# Patient Record
Sex: FEMALE | Race: WHITE | NOT HISPANIC OR LATINO | Employment: OTHER | URBAN - METROPOLITAN AREA
[De-identification: names, ages, dates, MRNs, and addresses within clinical notes are randomized per-mention and may not be internally consistent; named-entity substitution may affect disease eponyms.]

---

## 2017-01-26 ENCOUNTER — TRANSCRIBE ORDERS (OUTPATIENT)
Dept: OBSTETRICS AND GYNECOLOGY | Facility: CLINIC | Age: 77
End: 2017-01-26

## 2017-01-26 DIAGNOSIS — Z12.31 VISIT FOR SCREENING MAMMOGRAM: Primary | ICD-10-CM

## 2017-03-24 ENCOUNTER — TRANSCRIBE ORDERS (OUTPATIENT)
Dept: OBSTETRICS AND GYNECOLOGY | Facility: CLINIC | Age: 77
End: 2017-03-24

## 2017-03-24 DIAGNOSIS — Z12.31 VISIT FOR SCREENING MAMMOGRAM: Primary | ICD-10-CM

## 2017-04-10 DIAGNOSIS — Z13.820 SCREENING FOR OSTEOPOROSIS: Primary | ICD-10-CM

## 2017-05-03 ENCOUNTER — HOSPITAL ENCOUNTER (OUTPATIENT)
Dept: MAMMOGRAPHY | Facility: HOSPITAL | Age: 77
Discharge: HOME | End: 2017-05-03
Attending: OBSTETRICS & GYNECOLOGY | Admitting: OBSTETRICS & GYNECOLOGY

## 2017-05-03 DIAGNOSIS — Z12.31 VISIT FOR SCREENING MAMMOGRAM: ICD-10-CM

## 2017-05-03 PROCEDURE — G0202 SCR MAMMO BI INCL CAD: HCPCS | Performed by: RADIOLOGY

## 2017-05-03 PROCEDURE — 77063 BREAST TOMOSYNTHESIS BI: CPT | Performed by: RADIOLOGY

## 2017-05-03 PROCEDURE — G0202 SCR MAMMO BI INCL CAD: HCPCS

## 2017-05-03 PROCEDURE — 77063 BREAST TOMOSYNTHESIS BI: CPT

## 2017-05-23 ENCOUNTER — OFFICE VISIT (OUTPATIENT)
Dept: OBSTETRICS AND GYNECOLOGY | Facility: CLINIC | Age: 77
End: 2017-05-23

## 2017-05-23 ENCOUNTER — HOSPITAL ENCOUNTER (OUTPATIENT)
Dept: BONE DENSITY | Facility: HOSPITAL | Age: 77
Discharge: HOME | End: 2017-05-23
Attending: OBSTETRICS & GYNECOLOGY | Admitting: OBSTETRICS & GYNECOLOGY

## 2017-05-23 VITALS
BODY MASS INDEX: 22.66 KG/M2 | DIASTOLIC BLOOD PRESSURE: 80 MMHG | SYSTOLIC BLOOD PRESSURE: 122 MMHG | HEIGHT: 65 IN | WEIGHT: 136 LBS

## 2017-05-23 DIAGNOSIS — M85.80 OSTEOPENIA: ICD-10-CM

## 2017-05-23 DIAGNOSIS — N95.2 VAGINAL ATROPHY: ICD-10-CM

## 2017-05-23 DIAGNOSIS — Z13.820 SCREENING FOR OSTEOPOROSIS: ICD-10-CM

## 2017-05-23 DIAGNOSIS — Z78.0 MENOPAUSE: Primary | ICD-10-CM

## 2017-05-23 DIAGNOSIS — N32.81 OAB (OVERACTIVE BLADDER): ICD-10-CM

## 2017-05-23 PROCEDURE — 77080 DXA BONE DENSITY AXIAL: CPT

## 2017-05-23 PROCEDURE — 77080 DXA BONE DENSITY AXIAL: CPT | Performed by: RADIOLOGY

## 2017-05-23 PROCEDURE — 99213 OFFICE O/P EST LOW 20 MIN: CPT | Performed by: OBSTETRICS & GYNECOLOGY

## 2017-05-23 RX ORDER — TOLTERODINE 4 MG/1
4 CAPSULE, EXTENDED RELEASE ORAL DAILY
COMMUNITY
End: 2018-05-02 | Stop reason: DRUGHIGH

## 2017-05-23 RX ORDER — AMITRIPTYLINE HYDROCHLORIDE 10 MG/1
1 TABLET, FILM COATED ORAL DAILY
COMMUNITY
Start: 2017-04-26 | End: 2019-06-19

## 2017-09-24 ENCOUNTER — OFFICE VISIT (OUTPATIENT)
Dept: RETAIL CLINIC | Facility: CLINIC | Age: 77
End: 2017-09-24

## 2017-09-24 DIAGNOSIS — Z23 NEEDS FLU SHOT: Primary | ICD-10-CM

## 2017-09-24 NOTE — PROGRESS NOTES
S: Requests Flu Vaccine.  Is feeling well today. Denies previous complications of flu vaccine, denies serious egg allergy. [employer] consent obtained.  O: Appears well today.  A: Vaccination against Influenza   P: Age appropriate flu vaccine administered (see vaccine immunization record) Tolerated Well.  Discussed that they may feel achy and fatigued in next day or 2, as their immune system is responding, this is not the flu. Advised that can take tylenol or ibuprofen per package instructions for soreness if needed. Also explained that it takes up to 2 weeks for full immune response. Encouraged general health hygiene. HERIBERTO Kate

## 2018-03-20 ENCOUNTER — TRANSCRIBE ORDERS (OUTPATIENT)
Dept: ADMINISTRATIVE | Facility: HOSPITAL | Age: 78
End: 2018-03-20

## 2018-03-20 DIAGNOSIS — Z12.31 VISIT FOR SCREENING MAMMOGRAM: Primary | ICD-10-CM

## 2018-05-02 ENCOUNTER — OFFICE VISIT (OUTPATIENT)
Dept: OBSTETRICS AND GYNECOLOGY | Facility: CLINIC | Age: 78
End: 2018-05-02

## 2018-05-02 ENCOUNTER — HOSPITAL ENCOUNTER (OUTPATIENT)
Dept: CARDIOLOGY | Facility: HOSPITAL | Age: 78
Discharge: HOME | End: 2018-05-02
Attending: OBSTETRICS & GYNECOLOGY | Admitting: OBSTETRICS & GYNECOLOGY

## 2018-05-02 ENCOUNTER — APPOINTMENT (OUTPATIENT)
Dept: LAB | Facility: HOSPITAL | Age: 78
End: 2018-05-02

## 2018-05-02 ENCOUNTER — DOCUMENTATION (OUTPATIENT)
Dept: OBSTETRICS AND GYNECOLOGY | Facility: CLINIC | Age: 78
End: 2018-05-02

## 2018-05-02 VITALS
WEIGHT: 139 LBS | DIASTOLIC BLOOD PRESSURE: 76 MMHG | SYSTOLIC BLOOD PRESSURE: 150 MMHG | HEIGHT: 65 IN | BODY MASS INDEX: 23.16 KG/M2

## 2018-05-02 DIAGNOSIS — Z01.818 PREOPERATIVE TESTING: Primary | ICD-10-CM

## 2018-05-02 DIAGNOSIS — N95.0 PMB (POSTMENOPAUSAL BLEEDING): ICD-10-CM

## 2018-05-02 DIAGNOSIS — N95.0 PMB (POSTMENOPAUSAL BLEEDING): Primary | ICD-10-CM

## 2018-05-02 DIAGNOSIS — N95.2 VAGINAL ATROPHY: ICD-10-CM

## 2018-05-02 DIAGNOSIS — Z01.818 PREOPERATIVE TESTING: ICD-10-CM

## 2018-05-02 DIAGNOSIS — Z78.0 MENOPAUSE: Primary | ICD-10-CM

## 2018-05-02 LAB
ALBUMIN SERPL-MCNC: 4 G/DL (ref 3.2–4.8)
ALBUMIN/GLOB SERPL: 1.8 G/DL (ref 1.5–2.5)
ALP SERPL-CCNC: 69 U/L (ref 25–100)
ALT SERPL W P-5'-P-CCNC: 19 U/L (ref 7–40)
ANION GAP SERPL CALCULATED.3IONS-SCNC: 7 MMOL/L (ref 3–11)
AST SERPL-CCNC: 31 U/L (ref 0–33)
BACTERIA UR QL AUTO: ABNORMAL /HPF
BILIRUB SERPL-MCNC: 0.3 MG/DL (ref 0.3–1.2)
BILIRUB UR QL STRIP: NEGATIVE
BUN BLD-MCNC: 25 MG/DL (ref 9–23)
BUN/CREAT SERPL: 25 (ref 7–25)
CALCIUM SPEC-SCNC: 9 MG/DL (ref 8.7–10.4)
CHLORIDE SERPL-SCNC: 107 MMOL/L (ref 99–109)
CLARITY UR: CLEAR
CO2 SERPL-SCNC: 28 MMOL/L (ref 20–31)
COLOR UR: YELLOW
CREAT BLD-MCNC: 1 MG/DL (ref 0.6–1.3)
DEPRECATED RDW RBC AUTO: 45.7 FL (ref 37–54)
ERYTHROCYTE [DISTWIDTH] IN BLOOD BY AUTOMATED COUNT: 14.1 % (ref 11.3–14.5)
GFR SERPL CREATININE-BSD FRML MDRD: 54 ML/MIN/1.73
GLOBULIN UR ELPH-MCNC: 2.2 GM/DL
GLUCOSE BLD-MCNC: 96 MG/DL (ref 70–100)
GLUCOSE UR STRIP-MCNC: NEGATIVE MG/DL
HCT VFR BLD AUTO: 44.7 % (ref 34.5–44)
HGB BLD-MCNC: 14.7 G/DL (ref 11.5–15.5)
HGB UR QL STRIP.AUTO: NEGATIVE
HYALINE CASTS UR QL AUTO: ABNORMAL /LPF
KETONES UR QL STRIP: NEGATIVE
LEUKOCYTE ESTERASE UR QL STRIP.AUTO: ABNORMAL
MCH RBC QN AUTO: 28.9 PG (ref 27–31)
MCHC RBC AUTO-ENTMCNC: 32.9 G/DL (ref 32–36)
MCV RBC AUTO: 87.8 FL (ref 80–99)
NITRITE UR QL STRIP: NEGATIVE
PH UR STRIP.AUTO: <=5 [PH] (ref 5–8)
PLATELET # BLD AUTO: 268 10*3/MM3 (ref 150–450)
PMV BLD AUTO: 9.5 FL (ref 6–12)
POTASSIUM BLD-SCNC: 4.5 MMOL/L (ref 3.5–5.5)
PROT SERPL-MCNC: 6.2 G/DL (ref 5.7–8.2)
PROT UR QL STRIP: NEGATIVE
RBC # BLD AUTO: 5.09 10*6/MM3 (ref 3.89–5.14)
RBC # UR: ABNORMAL /HPF
REF LAB TEST METHOD: ABNORMAL
SODIUM BLD-SCNC: 142 MMOL/L (ref 132–146)
SP GR UR STRIP: 1.01 (ref 1–1.03)
SQUAMOUS #/AREA URNS HPF: ABNORMAL /HPF
UROBILINOGEN UR QL STRIP: ABNORMAL
WBC NRBC COR # BLD: 8.33 10*3/MM3 (ref 3.5–10.8)
WBC UR QL AUTO: ABNORMAL /HPF

## 2018-05-02 PROCEDURE — 87086 URINE CULTURE/COLONY COUNT: CPT | Performed by: OBSTETRICS & GYNECOLOGY

## 2018-05-02 PROCEDURE — 93010 ELECTROCARDIOGRAM REPORT: CPT | Performed by: INTERNAL MEDICINE

## 2018-05-02 PROCEDURE — 80053 COMPREHEN METABOLIC PANEL: CPT | Performed by: OBSTETRICS & GYNECOLOGY

## 2018-05-02 PROCEDURE — 87186 SC STD MICRODIL/AGAR DIL: CPT | Performed by: OBSTETRICS & GYNECOLOGY

## 2018-05-02 PROCEDURE — 85027 COMPLETE CBC AUTOMATED: CPT | Performed by: OBSTETRICS & GYNECOLOGY

## 2018-05-02 PROCEDURE — 87077 CULTURE AEROBIC IDENTIFY: CPT | Performed by: OBSTETRICS & GYNECOLOGY

## 2018-05-02 PROCEDURE — 99213 OFFICE O/P EST LOW 20 MIN: CPT | Performed by: OBSTETRICS & GYNECOLOGY

## 2018-05-02 PROCEDURE — 36415 COLL VENOUS BLD VENIPUNCTURE: CPT | Performed by: OBSTETRICS & GYNECOLOGY

## 2018-05-02 PROCEDURE — 93005 ELECTROCARDIOGRAM TRACING: CPT | Performed by: OBSTETRICS & GYNECOLOGY

## 2018-05-02 PROCEDURE — 81001 URINALYSIS AUTO W/SCOPE: CPT | Performed by: OBSTETRICS & GYNECOLOGY

## 2018-05-02 RX ORDER — TOLTERODINE TARTRATE 1 MG/1
1 TABLET, EXTENDED RELEASE ORAL DAILY
COMMUNITY
End: 2019-06-19 | Stop reason: DRUGHIGH

## 2018-05-02 NOTE — PROGRESS NOTES
"   Chief Complaint   Patient presents with   • Vaginal Bleeding     'pinkish\"       Yin Siddiqui is a 77 y.o. year old  presenting to be seen because of a recent history of post menopausal spotting.  She denies urethral or rectal bleeding.  She denies pelvic pain or pressure.  She is not anticoagulated.  She is being treated for osteopenia with raloxifene, 60 mg daily.  Her last DEXA in May 2017 showed only mild osteopenia of the right femoral neck with the total hip and spine being normal.  Breast imaging has been benign.  Ovarian screening has been normal.    History   Sexual Activity   • Sexual activity: Yes   • Partners: Male   • Birth control/ protection: Post-menopausal       SCREENING TESTS  Year 2012   Age                         PAP                         HPV high risk                         Mammogram      benign                   RASHAD score                         Breast MRI                         Lipids                         Vitamin D                         Colonoscopy                         DEXA  Frax (hip/any)      osteopenia                   Ovarian Screen      normal                       No Additional Complaints Reported    The following portions of the patient's history were reviewed and updated as appropriate:vital signs and   She has Menopause on her pertinent problem list.  She  has a past surgical history that includes Eye surgery; Tonsillectomy; Hand surgery; Cyst Removal; and Cataract extraction w/ intraocular lens  implant, bilateral.  Current Outpatient Prescriptions   Medication Sig Dispense Refill   • tolterodine (DETROL) 1 MG tablet Take 1 mg by mouth Daily.     • amitriptyline (ELAVIL) 10 MG tablet Take 1 tablet by mouth Daily.     • LORazepam (ATIVAN) 1 MG tablet Take 1 mg by mouth daily.     • naproxen (NAPROSYN) 375 MG tablet Take 375 mg by mouth 2 (two) times a " "day as needed for mild pain (1-3).     • raloxifene (EVISTA) 60 MG tablet TAKE ONE TABLET BY MOUTH ONCE DAILY 90 tablet 2   • simvastatin (ZOCOR) 10 MG tablet Take 10 mg by mouth every night.       No current facility-administered medications for this visit.      She has No Known Allergies..        Review of Systems  A comprehensive review of systems was done.  Constitutional: negative for fever, chills, activity change, appetite change, fatigue and unexpected weight change.  Respiratory: negative  Cardiovascular: negative  Gastrointestinal: negative  Genitourinary:spotting  Musculoskeletal:negative  Behavioral/Psych: negative          /76   Ht 163.8 cm (64.5\")   Wt 63 kg (139 lb)   BMI 23.49 kg/m²     Physical Exam    General:  alert; cooperative; well developed; well nourished   Skin:  No suspicious lesions seen   Thyroid: normal to inspection and palpation   Lungs:  clear to auscultation bilaterally   Heart:  regular rate and rhythm, S1, S2 normal, no murmur, click, rub or gallop   Breasts:  Not performed.   Abdomen: soft, non-tender; no masses  no umbilical or inginual hernias are present  no hepato-splenomegaly   Pelvis: Clinical staff was present for exam  External genitalia:  normal appearance of the external genitalia including Bartholin's and [place]'s glands.  Vaginal:  atrophic mucosal changes are present;  Cervix:  normal appearance.  Uterus:  normal size, shape and consistency. anteverted;  Adnexa:  non palpable bilaterally.  Rectal:  anus visually normal appearing. recto-vaginal exam unremarkable and confirms findings;     Lab Review   No data reviewed    Imaging   Mammogram results- benign, and DEXA- mild osteopenia of the femoral neck    Medical counseling was given to patient for the following topics: diagnostic results, instructions for management, risk factor reductions and impressions . Total time of the encounter was 23 minute(s) and 17 minute(s)  was spent in face to face counseling.  I " have counseled the patient that she needs a pelvic ultrasound to evaluate her endometrial stripe.  I have counseled the patient that her ovarian screening has been normal.  I have counseled her in the need for continued weight-bearing bearing exercise as well as calcium with vitamin D.  I have counseled her that she may discontinue her raloxifene since she only has mild osteopenia of the right femoral neck  I have counseled the patient to follow-up with Dr. Brown regarding her OAB.          ASSESSMENT  Problems Addressed this Visit        Genitourinary    Menopause - Primary    Vaginal atrophy      Other Visit Diagnoses     PMB (postmenopausal bleeding)        Relevant Orders    US Non-ob Transvaginal            PLAN    · Medications prescribed this encounter:  No orders of the defined types were placed in this encounter.  · Pelvic ultrasound scan  · Follow up: PRN  · Calcium, 600 mg/ Vit. D, 400 IU daily     *Please note that portions of this documentation may have been completed with a voice recognition program.  Efforts were made to edit this dictation, but occasional words may have been mistranscribed.     This note was electronically signed.    TERRY Lam MD  May 2, 2018  3:00 PM

## 2018-05-02 NOTE — PROGRESS NOTES
History and Physical    Chief Complaint: Postmenopausal bleeding    Yin Siddiqui is a 77 y.o., , was seen in the office on 2018 with a recent history of postmenopausal spotting.  The patient denies pelvic pain or pressure.  A pelvic ultrasound revealed a 21.2 mm endometrial stripe.  The uterus is retroverted and normal size.  The ovaries are normal.  She is scheduled for hysteroscopy D&C with the Myosure device to further evaluate her postmenopausal bleeding.  The procedure has been explained to the patient and her  including the surgical risks of uterine perforation, bleeding, infection, and anesthetic risks.  She voiced understanding of these risks.  Informed consent will be signed.    Past Medical History:   Diagnosis Date   • Degenerative disc disease, lumbar    • Dyslipidemia    • Fibrocystic breast changes    • GERD (gastroesophageal reflux disease)    • Menopause    • Osteopenia    • Urge incontinence    • Vaginal atrophy        Allergies: Review of patient's allergies indicates no known allergies.    Medications:   Current Outpatient Prescriptions:   •  amitriptyline (ELAVIL) 10 MG tablet, Take 1 tablet by mouth Daily., Disp: , Rfl:   •  LORazepam (ATIVAN) 1 MG tablet, Take 1 mg by mouth daily., Disp: , Rfl:   •  naproxen (NAPROSYN) 375 MG tablet, Take 375 mg by mouth 2 (two) times a day as needed for mild pain (1-3)., Disp: , Rfl:   •  raloxifene (EVISTA) 60 MG tablet, TAKE ONE TABLET BY MOUTH ONCE DAILY, Disp: 90 tablet, Rfl: 2  •  simvastatin (ZOCOR) 10 MG tablet, Take 10 mg by mouth every night., Disp: , Rfl:   •  tolterodine (DETROL) 1 MG tablet, Take 1 mg by mouth Daily., Disp: , Rfl:     Previous Surgery:   Past Surgical History:   Procedure Laterality Date   • CATARACT EXTRACTION W/ INTRAOCULAR LENS  IMPLANT, BILATERAL     • CYST REMOVAL      left wrist   • EYE SURGERY     • HAND SURGERY     • TONSILLECTOMY         Review of Systems  A comprehensive review of systems was  negative.  Constitutional: negative for fever, chills, activity change, appetite change, fatigue and unexpected weight change.  Respiratory: negative  Cardiovascular: negative  Gastrointestinal: negative  Genitourinary:vaginal spotting  Musculoskeletal:negative  Behavioral/Psych: positive for mood swings      Social History   Substance Use Topics   • Smoking status: Never Smoker   • Smokeless tobacco: Never Used   • Alcohol use No       Recent Vitals       5/17/2016 5/23/2017 5/2/2018       BP: 126/78 122/80 150/76     Weight: 61.7 kg (136 lb) 61.7 kg (136 lb) 63 kg (139 lb)     BMI (Calculated): 23 23 23.5           Physical Exam  General:  alert; cooperative; well developed; well nourished   Skin:  No suspicious lesions seen   Thyroid: normal to inspection and palpation   Lungs:  breathing is unlabored  clear to auscultation bilaterally   Heart:  regular rate and rhythm, S1, S2 normal, no murmur, click, rub or gallop   Breasts:  Examined in supine position  Symmetric without masses or skin dimpling  Nipples normal without inversion, lesions or discharge  There are no palpable axillary nodes   Abdomen: soft, non-tender; no masses  no umbilical or inginual hernias are present  no hepato-splenomegaly   Pelvis: Clinical staff was present for exam  External genitalia:  normal appearance of the external genitalia including Bartholin's and [place]'s glands.  Vaginal:  normal pink mucosa without prolapse or lesions.  Cervix:  normal appearance.  Uterus:  normal size, shape and consistency. retroverted;  Adnexa:  non palpable bilaterally.  Rectal:  anus visually normal appearing. recto-vaginal exam unremarkable and confirms findings;         The surgical risks of uterine perforation, bleeding, infection and anesthestic risks were explained to the patient and she voiced understanding. Informed consent was signed.    No contraindications to planned surgery were detected      Impression: 1) Postmenopausal bleeding [N95.0]; 2)  Thickened endometrium [R93.8]        Plan: 1) Hysteroscopy, D&C (Myosure Reach)      *Please note that portions of this documentation may have been completed with a voice recognition program.  Efforts were made to edit this dictation, but occasional words may have been mistranscribed.  This note was electronically signed.    TERRY Lam MD  May 2, 2018  4:23 PM

## 2018-05-03 ENCOUNTER — TELEPHONE (OUTPATIENT)
Dept: OBSTETRICS AND GYNECOLOGY | Facility: CLINIC | Age: 78
End: 2018-05-03

## 2018-05-03 DIAGNOSIS — N30.90 CYSTITIS: Primary | ICD-10-CM

## 2018-05-03 RX ORDER — SULFAMETHOXAZOLE AND TRIMETHOPRIM 800; 160 MG/1; MG/1
1 TABLET ORAL 2 TIMES DAILY
Qty: 10 TABLET | Refills: 0 | Status: SHIPPED | OUTPATIENT
Start: 2018-05-03 | End: 2018-05-08

## 2018-05-03 NOTE — TELEPHONE ENCOUNTER
Pt's  notified of Urinalysis indicating need to treat. Instructions given for pt to take Bactrim DS TWO times today, then will receive IV antibiotics tomorrow, so to resume Bactrim on Saturday 5/5, until complete. Also encouraged  to remind pt to drink extra fluids. He voiced understanding instructions.

## 2018-05-04 ENCOUNTER — LAB REQUISITION (OUTPATIENT)
Dept: LAB | Facility: HOSPITAL | Age: 78
End: 2018-05-04

## 2018-05-04 ENCOUNTER — OUTSIDE FACILITY SERVICE (OUTPATIENT)
Dept: OBSTETRICS AND GYNECOLOGY | Facility: CLINIC | Age: 78
End: 2018-05-04

## 2018-05-04 DIAGNOSIS — N95.0 POSTMENOPAUSAL BLEEDING: ICD-10-CM

## 2018-05-04 LAB
BACTERIA SPEC AEROBE CULT: ABNORMAL
BACTERIA SPEC AEROBE CULT: ABNORMAL

## 2018-05-04 PROCEDURE — 58558 HYSTEROSCOPY BIOPSY: CPT | Performed by: OBSTETRICS & GYNECOLOGY

## 2018-05-04 PROCEDURE — 88305 TISSUE EXAM BY PATHOLOGIST: CPT | Performed by: OBSTETRICS & GYNECOLOGY

## 2018-05-07 ENCOUNTER — HOSPITAL ENCOUNTER (OUTPATIENT)
Dept: MAMMOGRAPHY | Facility: HOSPITAL | Age: 78
Discharge: HOME | End: 2018-05-07
Attending: OBSTETRICS & GYNECOLOGY | Admitting: OBSTETRICS & GYNECOLOGY

## 2018-05-07 DIAGNOSIS — Z12.31 VISIT FOR SCREENING MAMMOGRAM: ICD-10-CM

## 2018-05-07 LAB
CYTO UR: NORMAL
LAB AP CASE REPORT: NORMAL
LAB AP CLINICAL INFORMATION: NORMAL
Lab: NORMAL
PATH REPORT.FINAL DX SPEC: NORMAL
PATH REPORT.GROSS SPEC: NORMAL

## 2018-05-07 PROCEDURE — 77063 BREAST TOMOSYNTHESIS BI: CPT

## 2018-05-07 PROCEDURE — 77067 SCR MAMMO BI INCL CAD: CPT

## 2018-05-07 PROCEDURE — 77063 BREAST TOMOSYNTHESIS BI: CPT | Performed by: RADIOLOGY

## 2018-05-07 PROCEDURE — 77067 SCR MAMMO BI INCL CAD: CPT | Performed by: RADIOLOGY

## 2018-05-08 ENCOUNTER — TELEPHONE (OUTPATIENT)
Dept: OBSTETRICS AND GYNECOLOGY | Facility: CLINIC | Age: 78
End: 2018-05-08

## 2018-05-08 DIAGNOSIS — N95.0 PMB (POSTMENOPAUSAL BLEEDING): Primary | ICD-10-CM

## 2018-05-08 NOTE — TELEPHONE ENCOUNTER
This patient underwent a hysteroscopy D&C on 5/4/2018 for postmenopausal spotting.  The endometrial curettings were benign with evidence of some double benign polyp.  She calls today to indicate that she is still having spotting.  Has used 4 pads today.  She denies pelvic pain or pressure.  I have advised her that I will treat with oral micronized progesterone, 100 mg twice a day for 2 weeks to attempt to stop the bleeding.  If bleeding does not resolve or if the bleeding resolves and recurs she will be a candidate for TLH/BSO.  We'll notify us of her response to medication.

## 2018-05-15 ENCOUNTER — TELEPHONE (OUTPATIENT)
Dept: OBSTETRICS AND GYNECOLOGY | Facility: CLINIC | Age: 78
End: 2018-05-15

## 2018-05-15 DIAGNOSIS — N95.0 PMB (POSTMENOPAUSAL BLEEDING): ICD-10-CM

## 2018-05-15 NOTE — TELEPHONE ENCOUNTER
"Pt calling to say she is doing well since her Hysteroscopy D&C and vaginal bleeding has decreased \"quite a bit\" since she has been taking the progesterone 100 mg 2 x daily. She says she has 5.5 days left on the RX and is wondering if we could give her 1 refill because they will be traveling to [facility] and would like to be less bothered by the possibility of heavier vag bleeding. Per Dr Lam: OK to send in Progesterone 100 mg  BID #28, with no refills. Pt has appt 6/6/18 and understands to call sooner with questions or concerns.  "

## 2018-06-06 ENCOUNTER — OFFICE VISIT (OUTPATIENT)
Dept: OBSTETRICS AND GYNECOLOGY | Facility: CLINIC | Age: 78
End: 2018-06-06

## 2018-06-06 VITALS
HEIGHT: 65 IN | DIASTOLIC BLOOD PRESSURE: 72 MMHG | SYSTOLIC BLOOD PRESSURE: 116 MMHG | BODY MASS INDEX: 22.36 KG/M2 | WEIGHT: 134.2 LBS

## 2018-06-06 DIAGNOSIS — Z01.419 ENCOUNTER FOR GYNECOLOGICAL EXAMINATION WITHOUT ABNORMAL FINDING: ICD-10-CM

## 2018-06-06 DIAGNOSIS — Z78.0 MENOPAUSE: Primary | ICD-10-CM

## 2018-06-06 DIAGNOSIS — N60.11 FIBROCYSTIC BREAST CHANGES OF BOTH BREASTS: ICD-10-CM

## 2018-06-06 DIAGNOSIS — N32.81 OAB (OVERACTIVE BLADDER): ICD-10-CM

## 2018-06-06 DIAGNOSIS — N95.2 VAGINAL ATROPHY: ICD-10-CM

## 2018-06-06 DIAGNOSIS — N60.12 FIBROCYSTIC BREAST CHANGES OF BOTH BREASTS: ICD-10-CM

## 2018-06-06 PROCEDURE — G0101 CA SCREEN;PELVIC/BREAST EXAM: HCPCS | Performed by: OBSTETRICS & GYNECOLOGY

## 2018-06-06 NOTE — PROGRESS NOTES
Chief Complaint   Patient presents with   • Gynecologic Exam       Yin Siddiqui is a 77 y.o. year old  presenting to be seen for her annual exam.This patient had postmenopausal bleeding and underwent a hysteroscopy D&C on 2018 with findings of a benign endometrial polyp.  She had postoperative bleeding and was treated with Provera, 5 mg twice a day with cessation of bleeding.  She has had no further PMB.  She has a history of mild osteopenia of the femoral neck.  She has a history of overactive bladder treated with tolterodine.  Rest imaging has been benign.    SCREENING TESTS    Year 2012   Age                         PAP                         HPV high risk                         Mammogram      benign benign                  RASHAD score                         Breast MRI                         Lipids                         Vitamin D                         Colonoscopy                         DEXA  Frax (hip/any)      osteopenia                   Ovarian Screen                             She exercises regularly: yes.  She wears her seat belt: yes.  She has concerns about domestic violence: no.  She has noticed changes in height: no    GYN screening history:  · Last mammogram: was done on approximately 2018 and the result was: Birads I (Normal).  · Last DEXA: was done on approximately 2017 and the results were: osteopenia of the femoral neck.    No Additional Complaints Reported    The following portions of the patient's history were reviewed and updated as appropriate:vital signs and   She  has a past medical history of Degenerative disc disease, lumbar; Dyslipidemia; GERD (gastroesophageal reflux disease); Menopause; Osteopenia; Urge incontinence; and Vaginal atrophy.  She has Menopause on her pertinent problem list.  She  has a past surgical history that includes Eye surgery;  "Tonsillectomy; Hand surgery; Cyst Removal; Cataract extraction w/ intraocular lens  implant, bilateral; and d&c hysteroscopy.  Her family history includes Breast cancer (age of onset: 78) in her mother.  She  reports that she has never smoked. She has never used smokeless tobacco. She reports that she does not drink alcohol or use drugs.  Current Outpatient Prescriptions   Medication Sig Dispense Refill   • amitriptyline (ELAVIL) 10 MG tablet Take 1 tablet by mouth Daily.     • LORazepam (ATIVAN) 1 MG tablet Take 1 mg by mouth daily.     • naproxen (NAPROSYN) 375 MG tablet Take 375 mg by mouth 2 (two) times a day as needed for mild pain (1-3).     • simvastatin (ZOCOR) 10 MG tablet Take 10 mg by mouth every night.     • tolterodine (DETROL) 1 MG tablet Take 1 mg by mouth Daily.       No current facility-administered medications for this visit.      She has No Known Allergies..    Review of Systems  A comprehensive review of systems was taken.  Constitutional: negative for fever, chills, activity change, appetite change, fatigue and unexpected weight change.  Respiratory: negative  Cardiovascular: negative  Gastrointestinal: negative  Genitourinary:negative  Musculoskeletal:positive for back pain  Behavioral/Psych: negative       /72   Ht 163.8 cm (64.5\")   Wt 60.9 kg (134 lb 3.2 oz)   BMI 22.68 kg/m²     Physical Exam    General:  alert; cooperative; well developed; well nourished   Skin:  No suspicious lesions seen   Thyroid: normal to inspection and palpation   Lungs:  clear to auscultation bilaterally   Heart:  regular rate and rhythm, S1, S2 normal, no murmur, click, rub or gallop   Breasts:  Examined in supine position  Symmetric without masses or skin dimpling  Nipples normal without inversion, lesions or discharge  There are no palpable axillary nodes  Fibrocystic changes are present both breasts without a discrete mass   Abdomen: soft, non-tender; no masses  no umbilical or inginual hernias are " present  no hepato-splenomegaly   Pelvis: Clinical staff was present for exam  External genitalia:  normal appearance of the external genitalia including Bartholin's and [place]'s glands.  Vaginal:  atrophic mucosal changes are present;  Cervix:  normal appearance.  Uterus:  normal size, shape and consistency. retroverted;  Adnexa:  non palpable bilaterally.  Rectal:  anus visually normal appearing. recto-vaginal exam unremarkable and confirms findings;     Lab Review   No data reviewed    Imaging  Mammogram results- benign, and DEXA- mild osteopenia of the right femoral neck, with the spine and total hip being normal         ASSESSMENT  Problems Addressed this Visit        Musculoskeletal and Integument    OAB (overactive bladder)       Genitourinary    Menopause - Primary    Vaginal atrophy      Other Visit Diagnoses     Encounter for gynecological examination without abnormal finding               Fibrocystic changes of both breasts    PLAN    · Medications prescribed this encounter:  No orders of the defined types were placed in this encounter.  · Monthly self breast assessment and annual breast imaging  · The patient has further PMB she will contact me  · Calcium, 600 mg/ Vit. D, 400 IU daily; regular weight-bearing exercise  · Follow up: 12 month(s)  *Please note that portions of this documentation may have been completed with a voice recognition program.  Efforts were made to edit this dictation, but occasional words may have been mistranscribed.       This note was electronically signed.    TERRY Lam MD  June 6, 2018  10:16 AM

## 2018-11-13 ENCOUNTER — TELEPHONE (OUTPATIENT)
Dept: OBSTETRICS AND GYNECOLOGY | Facility: CLINIC | Age: 78
End: 2018-11-13

## 2018-11-13 NOTE — TELEPHONE ENCOUNTER
[facility] called us to indicate that on her normal ovarian screening there was fluid in the endometrium and possibly a small polyp.  This patient had a hysteroscopy/D&C with a small polyp removed in May of 2018.  On questioning she has had no bleeding.  I have reassured her that I do not believe this is malignant since her uterus is very small and the prior curettings in May were benign.  I asked her to contact me immediately if she has any spotting or bleeding.

## 2019-03-27 ENCOUNTER — TRANSCRIBE ORDERS (OUTPATIENT)
Dept: ADMINISTRATIVE | Facility: HOSPITAL | Age: 79
End: 2019-03-27

## 2019-03-27 DIAGNOSIS — Z12.31 VISIT FOR SCREENING MAMMOGRAM: Primary | ICD-10-CM

## 2019-04-10 DIAGNOSIS — Z12.11 SCREENING FOR COLON CANCER: Primary | ICD-10-CM

## 2019-04-16 ENCOUNTER — OUTSIDE FACILITY SERVICE (OUTPATIENT)
Dept: GASTROENTEROLOGY | Facility: CLINIC | Age: 79
End: 2019-04-16

## 2019-04-16 PROCEDURE — G0121 COLON CA SCRN NOT HI RSK IND: HCPCS | Performed by: INTERNAL MEDICINE

## 2019-04-16 PROCEDURE — G0500 MOD SEDAT ENDO SERVICE >5YRS: HCPCS | Performed by: INTERNAL MEDICINE

## 2019-05-15 ENCOUNTER — HOSPITAL ENCOUNTER (OUTPATIENT)
Dept: MAMMOGRAPHY | Facility: HOSPITAL | Age: 79
Discharge: HOME | End: 2019-05-15
Admitting: FAMILY MEDICINE

## 2019-05-15 DIAGNOSIS — Z12.31 VISIT FOR SCREENING MAMMOGRAM: ICD-10-CM

## 2019-05-15 PROCEDURE — 77063 BREAST TOMOSYNTHESIS BI: CPT | Performed by: RADIOLOGY

## 2019-05-15 PROCEDURE — 77067 SCR MAMMO BI INCL CAD: CPT

## 2019-05-15 PROCEDURE — 77067 SCR MAMMO BI INCL CAD: CPT | Performed by: RADIOLOGY

## 2019-05-15 PROCEDURE — 77063 BREAST TOMOSYNTHESIS BI: CPT

## 2019-06-19 ENCOUNTER — OFFICE VISIT (OUTPATIENT)
Dept: OBSTETRICS AND GYNECOLOGY | Facility: CLINIC | Age: 79
End: 2019-06-19

## 2019-06-19 VITALS
DIASTOLIC BLOOD PRESSURE: 68 MMHG | WEIGHT: 132 LBS | BODY MASS INDEX: 21.99 KG/M2 | HEIGHT: 65 IN | SYSTOLIC BLOOD PRESSURE: 112 MMHG

## 2019-06-19 DIAGNOSIS — Z01.419 ENCOUNTER FOR GYNECOLOGICAL EXAMINATION WITHOUT ABNORMAL FINDING: ICD-10-CM

## 2019-06-19 DIAGNOSIS — N39.41 URGE INCONTINENCE: ICD-10-CM

## 2019-06-19 DIAGNOSIS — N60.12 FIBROCYSTIC BREAST CHANGES OF BOTH BREASTS: ICD-10-CM

## 2019-06-19 DIAGNOSIS — N60.11 FIBROCYSTIC BREAST CHANGES OF BOTH BREASTS: ICD-10-CM

## 2019-06-19 DIAGNOSIS — Z78.0 MENOPAUSE: Primary | ICD-10-CM

## 2019-06-19 DIAGNOSIS — N95.2 VAGINAL ATROPHY: ICD-10-CM

## 2019-06-19 PROCEDURE — 99397 PER PM REEVAL EST PAT 65+ YR: CPT | Performed by: OBSTETRICS & GYNECOLOGY

## 2019-06-19 RX ORDER — TOLTERODINE TARTRATE 2 MG/1
2 TABLET, EXTENDED RELEASE ORAL DAILY
Qty: 30 TABLET | Refills: 11 | Status: SHIPPED | OUTPATIENT
Start: 2019-06-19 | End: 2019-10-28

## 2019-06-19 RX ORDER — ZOSTER VACCINE RECOMBINANT, ADJUVANTED 50 MCG/0.5
KIT INTRAMUSCULAR
COMMUNITY
Start: 2019-06-17 | End: 2019-09-26

## 2019-06-19 RX ORDER — DONEPEZIL HYDROCHLORIDE 10 MG/1
10 TABLET, FILM COATED ORAL NIGHTLY
COMMUNITY
End: 2021-06-18

## 2019-06-19 RX ORDER — HEPATITIS A VACCINE 1440 [IU]/ML
INJECTION, SUSPENSION INTRAMUSCULAR
COMMUNITY
Start: 2019-06-17 | End: 2019-09-26

## 2019-06-19 NOTE — PROGRESS NOTES
Chief Complaint   Patient presents with   • Gynecologic Exam       Yin Siddiqui is a 78 y.o. year old  presenting to be seen for her annual exam.  This patient is menopausal and does not use estrogen/progestin replacement therapy.  She denies menopausal symptoms.  She does have a history of vaginal atrophy.  In May 2018 she had an episode of postmenopausal spotting.  She had a pelvic ultrasound on 2018 revealing slight thickening of the endometrial cavity and normal ovaries.  On 2018 she underwent a hysteroscopy/D&C/endometrial polypectomy for a benign endometrial polyp.  She has had no further postmenopausal bleeding or spotting.  She has a history of overactive bladder and is treated with tolterodine, 2 mg daily.  She has occasional leakage.  She denies bowel symptoms.    SCREENING TESTS    Year 2012 2033   Age                         PAP                         HPV high risk                         Mammogram       benign benign                 RASHAD score                         Breast MRI                         Lipids                         Vitamin D                         Colonoscopy                         DEXA  Frax (hip/any)                         Ovarian Screen                             She exercises regularly: yes.  She wears her seat belt: yes.  She has concerns about domestic violence: no.  She has noticed changes in height: no    GYN screening history:  · Last mammogram: was done on approximately 5/15/2019 and the result was: Birads I (Normal)..    No Additional Complaints Reported    The following portions of the patient's history were reviewed and updated as appropriate:vital signs and   She  has a past medical history of Degenerative disc disease, lumbar, Dyslipidemia, GERD (gastroesophageal reflux disease), Menopause, Osteopenia, Urge incontinence, and Vaginal atrophy.  She  "has Menopause on their pertinent problem list.  She  has a past surgical history that includes Eye surgery; Tonsillectomy; Hand surgery; Cyst Removal; Cataract extraction w/ intraocular lens  implant, bilateral; and d&c hysteroscopy.  Her family history includes Breast cancer (age of onset: 78) in her mother.  She  reports that she has never smoked. She has never used smokeless tobacco. She reports that she does not drink alcohol or use drugs.  Current Outpatient Medications   Medication Sig Dispense Refill   • donepezil (ARICEPT) 10 MG tablet Take 5 mg by mouth Every Night.     • HAVRIX 1440 EL U/ML vaccine      • LORazepam (ATIVAN) 1 MG tablet Take 1 mg by mouth daily.     • naproxen (NAPROSYN) 375 MG tablet Take 375 mg by mouth 2 (two) times a day as needed for mild pain (1-3).     • SHINGRIX 50 MCG/0.5ML reconstituted suspension      • simvastatin (ZOCOR) 10 MG tablet Take 10 mg by mouth every night.     • tolterodine (DETROL) 2 MG tablet Take 1 tablet by mouth Daily. 30 tablet 11     No current facility-administered medications for this visit.      She has No Known Allergies..    Review of Systems  A comprehensive review of systems was taken.  Constitutional: negative for fever, chills, activity change, appetite change, fatigue and unexpected weight change.  Respiratory: negative  Cardiovascular: negative  Gastrointestinal: negative  Genitourinary:negative  Musculoskeletal:negative  Behavioral/Psych: negative       /68   Ht 163.8 cm (64.5\")   Wt 59.9 kg (132 lb)   Breastfeeding? No   BMI 22.31 kg/m²     Physical Exam    General:  alert; cooperative; well developed; well nourished   Skin:  No suspicious lesions seen   Thyroid: normal to inspection and palpation   Lungs:  clear to auscultation bilaterally   Heart:  regular rate and rhythm, S1, S2 normal, no murmur, click, rub or gallop   Breasts:  Examined in supine position  Symmetric without masses or skin dimpling  Nipples normal without inversion, " lesions or discharge  There are no palpable axillary nodes  Fibrocystic changes are present both breasts without a discrete mass   Abdomen: soft, non-tender; no masses  no umbilical or inguinal hernias are present  no hepato-splenomegaly   Pelvis: Clinical staff was present for exam  External genitalia:  normal appearance of the external genitalia including Bartholin's and [place]'s glands.  Vaginal:  atrophic mucosal changes are present;  Cervix:  normal appearance.  Uterus:  normal size, shape and consistency. retroverted;  Adnexa:  non palpable bilaterally.  Rectal:  anus visually normal appearing. recto-vaginal exam unremarkable and confirms findings;     Lab Review   No data reviewed    Imaging  Pelvic ultrasound results from 5/2018 revealed normal ovaries and thickening of the endometrial stripe, and Mammogram results- Birads I         ASSESSMENT  Problems Addressed this Visit        Genitourinary    Menopause - Primary    Vaginal atrophy    Urge incontinence    Relevant Medications    tolterodine (DETROL) 2 MG tablet       Other    Fibrocystic breast changes of both breasts      Other Visit Diagnoses     Encounter for gynecological examination without abnormal finding              PLAN    Medications prescribed this encounter:    New Medications Ordered This Visit   Medications   • tolterodine (DETROL) 2 MG tablet     Sig: Take 1 tablet by mouth Daily.     Dispense:  30 tablet     Refill:  11   · Monthly self breast assessment and annual breast imaging  · Calcium, 600 mg/ Vit. D, 400 IU daily; regular weight-bearing exercise  · Follow up: 12 month(s)  *Please note that portions of this documentation may have been completed with a voice recognition program.  Efforts were made to edit this dictation, but occasional words may have been mistranscribed.       This note was electronically signed.    TERRY Lam MD  June 19, 2019  3:58 PM

## 2019-09-13 ENCOUNTER — TELEPHONE (OUTPATIENT)
Dept: OBSTETRICS AND GYNECOLOGY | Facility: CLINIC | Age: 79
End: 2019-09-13

## 2019-09-13 NOTE — TELEPHONE ENCOUNTER
Yin called back.  I let her know that she would need to come in for a pelvic ultrasound and office visit next week.  She states that she had eye surgery 2 weeks ago, so would be unable to have surgery right away if needed.  Kaitlin will call her on Monday to schedule appointment for ultrasound and she will see Dr. Lam after the ultrasound to discuss results and need for treatment.

## 2019-09-13 NOTE — TELEPHONE ENCOUNTER
Attempted to return patient's call on the number requested.  No answer, but I left a message asking Yin to return my call.

## 2019-09-15 ENCOUNTER — APPOINTMENT (OUTPATIENT)
Dept: ULTRASOUND IMAGING | Facility: HOSPITAL | Age: 79
End: 2019-09-15

## 2019-09-15 ENCOUNTER — HOSPITAL ENCOUNTER (EMERGENCY)
Facility: HOSPITAL | Age: 79
Discharge: HOME | End: 2019-09-15
Attending: EMERGENCY MEDICINE | Admitting: EMERGENCY MEDICINE

## 2019-09-15 VITALS
HEART RATE: 87 BPM | SYSTOLIC BLOOD PRESSURE: 115 MMHG | RESPIRATION RATE: 16 BRPM | DIASTOLIC BLOOD PRESSURE: 76 MMHG | HEIGHT: 64 IN | WEIGHT: 125 LBS | BODY MASS INDEX: 21.34 KG/M2 | TEMPERATURE: 99 F | OXYGEN SATURATION: 98 %

## 2019-09-15 DIAGNOSIS — N95.0 POST-MENOPAUSAL BLEEDING: Primary | ICD-10-CM

## 2019-09-15 DIAGNOSIS — N85.8 UTERINE MASS: ICD-10-CM

## 2019-09-15 LAB
ALBUMIN SERPL-MCNC: 3.9 G/DL (ref 3.5–5.2)
ALBUMIN/GLOB SERPL: 1.3 G/DL
ALP SERPL-CCNC: 107 U/L (ref 39–117)
ALT SERPL W P-5'-P-CCNC: 20 U/L (ref 1–33)
ANION GAP SERPL CALCULATED.3IONS-SCNC: 11 MMOL/L (ref 5–15)
AST SERPL-CCNC: 27 U/L (ref 1–32)
BASOPHILS # BLD AUTO: 0.02 10*3/MM3 (ref 0–0.2)
BASOPHILS NFR BLD AUTO: 0.3 % (ref 0–1.5)
BILIRUB SERPL-MCNC: 0.3 MG/DL (ref 0.2–1.2)
BUN BLD-MCNC: 16 MG/DL (ref 8–23)
BUN/CREAT SERPL: 18 (ref 7–25)
CALCIUM SPEC-SCNC: 9.4 MG/DL (ref 8.6–10.5)
CHLORIDE SERPL-SCNC: 105 MMOL/L (ref 98–107)
CO2 SERPL-SCNC: 25 MMOL/L (ref 22–29)
CREAT BLD-MCNC: 0.89 MG/DL (ref 0.57–1)
DEPRECATED RDW RBC AUTO: 43.8 FL (ref 37–54)
EOSINOPHIL # BLD AUTO: 0.21 10*3/MM3 (ref 0–0.4)
EOSINOPHIL NFR BLD AUTO: 3.2 % (ref 0.3–6.2)
ERYTHROCYTE [DISTWIDTH] IN BLOOD BY AUTOMATED COUNT: 13.5 % (ref 12.3–15.4)
GFR SERPL CREATININE-BSD FRML MDRD: 62 ML/MIN/1.73
GLOBULIN UR ELPH-MCNC: 3 GM/DL
GLUCOSE BLD-MCNC: 101 MG/DL (ref 65–99)
HCT VFR BLD AUTO: 43 % (ref 34–46.6)
HGB BLD-MCNC: 13.7 G/DL (ref 12–15.9)
IMM GRANULOCYTES # BLD AUTO: 0.02 10*3/MM3 (ref 0–0.05)
IMM GRANULOCYTES NFR BLD AUTO: 0.3 % (ref 0–0.5)
INR PPP: 1 (ref 0.85–1.16)
LYMPHOCYTES # BLD AUTO: 1.26 10*3/MM3 (ref 0.7–3.1)
LYMPHOCYTES NFR BLD AUTO: 19.2 % (ref 19.6–45.3)
MCH RBC QN AUTO: 28.4 PG (ref 26.6–33)
MCHC RBC AUTO-ENTMCNC: 31.9 G/DL (ref 31.5–35.7)
MCV RBC AUTO: 89.2 FL (ref 79–97)
MONOCYTES # BLD AUTO: 0.61 10*3/MM3 (ref 0.1–0.9)
MONOCYTES NFR BLD AUTO: 9.3 % (ref 5–12)
NEUTROPHILS # BLD AUTO: 4.43 10*3/MM3 (ref 1.7–7)
NEUTROPHILS NFR BLD AUTO: 67.7 % (ref 42.7–76)
NRBC BLD AUTO-RTO: 0 /100 WBC (ref 0–0.2)
PLATELET # BLD AUTO: 331 10*3/MM3 (ref 140–450)
PMV BLD AUTO: 9.4 FL (ref 6–12)
POTASSIUM BLD-SCNC: 4.4 MMOL/L (ref 3.5–5.2)
PROT SERPL-MCNC: 6.9 G/DL (ref 6–8.5)
PROTHROMBIN TIME: 12.7 SECONDS (ref 11.2–14.3)
RBC # BLD AUTO: 4.82 10*6/MM3 (ref 3.77–5.28)
SODIUM BLD-SCNC: 141 MMOL/L (ref 136–145)
WBC NRBC COR # BLD: 6.55 10*3/MM3 (ref 3.4–10.8)

## 2019-09-15 PROCEDURE — 82378 CARCINOEMBRYONIC ANTIGEN: CPT | Performed by: EMERGENCY MEDICINE

## 2019-09-15 PROCEDURE — 99283 EMERGENCY DEPT VISIT LOW MDM: CPT

## 2019-09-15 PROCEDURE — 85610 PROTHROMBIN TIME: CPT | Performed by: EMERGENCY MEDICINE

## 2019-09-15 PROCEDURE — 86304 IMMUNOASSAY TUMOR CA 125: CPT | Performed by: EMERGENCY MEDICINE

## 2019-09-15 PROCEDURE — 80053 COMPREHEN METABOLIC PANEL: CPT | Performed by: EMERGENCY MEDICINE

## 2019-09-15 PROCEDURE — 76830 TRANSVAGINAL US NON-OB: CPT

## 2019-09-15 PROCEDURE — 85025 COMPLETE CBC W/AUTO DIFF WBC: CPT | Performed by: EMERGENCY MEDICINE

## 2019-09-15 RX ORDER — HYDROCODONE BITARTRATE AND ACETAMINOPHEN 5; 325 MG/1; MG/1
0.5 TABLET ORAL EVERY 6 HOURS PRN
Qty: 8 TABLET | Refills: 0 | Status: SHIPPED | OUTPATIENT
Start: 2019-09-15 | End: 2019-09-26

## 2019-09-15 NOTE — ED PROVIDER NOTES
"Subjective   Ms. Yin Siddiqui is a 78 y.o. female who presents to the ED with c/o vaginal bleeding. She reports for the past 2 days she has experienced spontaneous, heavy, and worsening vaginal bleeding. Since last night, she has soaked 2 large pads. She also complains of abdominal aching and denies nausea and changes in urination. 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 Patiño, opthalmologist. Since that procedure, she has experienced neck and back pain due to staying upright after the surgery. Her gynecologist is Dr. Lam. There are no other acute complaints at this time.        History provided by:  Patient  Vaginal Bleeding   Severity:  Moderate  Onset quality:  Sudden  Duration:  2 days  Timing:  Constant  Progression:  Unchanged  Chronicity:  New  Menstrual history:  Postmenopausal  Number of pads used:  2 large pads since last night  Context: spontaneously    Relieved by:  None tried  Ineffective treatments:  None tried  Associated symptoms: abdominal pain (\"aching\") and back pain    Associated symptoms: no dysuria and no nausea        Review of Systems   Gastrointestinal: Positive for abdominal pain (\"aching\"). Negative for nausea.   Genitourinary: Positive for vaginal bleeding. Negative for difficulty urinating, dysuria, frequency and urgency.   Musculoskeletal: Positive for back pain and neck pain.   All other systems reviewed and are negative.      Past Medical History:   Diagnosis Date   • Degenerative disc disease, lumbar    • Dyslipidemia    • GERD (gastroesophageal reflux disease)    • Menopause    • Osteopenia    • Urge incontinence    • Vaginal atrophy      Note from Dr. Lam's progress note on 19:    Yin Siddiqui is a 78 y.o. year old  presenting to be seen for her annual exam.  This patient is menopausal and does not use estrogen/progestin replacement therapy.  She denies menopausal symptoms.  She does have a history of vaginal atrophy.  In May " 2018 she had an episode of postmenopausal spotting.  She had a pelvic ultrasound on 5/2/2018 revealing slight thickening of the endometrial cavity and normal ovaries.  On 5/4/2018 she underwent a hysteroscopy/D&C/endometrial polypectomy for a benign endometrial polyp.  She has had no further postmenopausal bleeding or spotting.  She has a history of overactive bladder and is treated with tolterodine, 2 mg daily.  She has occasional leakage.  She denies bowel symptoms.    I also reviewed the office call-in note from 9/13/19.      No Known Allergies    Past Surgical History:   Procedure Laterality Date   • CATARACT EXTRACTION W/ INTRAOCULAR LENS  IMPLANT, BILATERAL     • CYST REMOVAL      left wrist   • D&C HYSTEROSCOPY     • EYE SURGERY     • HAND SURGERY     • TONSILLECTOMY         Family History   Problem Relation Age of Onset   • Breast cancer Mother 78   • Ovarian cancer Neg Hx        Social History     Socioeconomic History   • Marital status:      Spouse name: Not on file   • Number of children: Not on file   • Years of education: Not on file   • Highest education level: Not on file   Tobacco Use   • Smoking status: Never Smoker   • Smokeless tobacco: Never Used   Substance and Sexual Activity   • Alcohol use: No   • Drug use: No   • Sexual activity: Yes     Partners: Male     Birth control/protection: Post-menopausal         Objective   Physical Exam   Constitutional: She is oriented to person, place, and time. She appears well-developed and well-nourished. No distress.   The patient appears very anxious.   HENT:   Head: Normocephalic and atraumatic.   Nose: Nose normal.   The left eye is patched and the right eye is reactive and normal.   Eyes: Conjunctivae are normal. No scleral icterus.   Neck: Normal range of motion. Neck supple.   Cardiovascular: Normal rate, regular rhythm and normal heart sounds.   No murmur heard.  Pulmonary/Chest: Effort normal and breath sounds normal. No respiratory distress.    Abdominal: Soft. She exhibits no mass. There is tenderness. There is no guarding.   Thin with mild suprapubic tenderness on exam.  No organomegaly.   Genitourinary:   Genitourinary Comments: Normal, postmenopausal atrophic vagina.  There was approximately 3 tablespoons of blood in the vaginal vault that was aspirated with Yankauer suction.  No mass on the cervix.  Mild suprapubic tenderness on exam.   Musculoskeletal: Normal range of motion.   Neurological: She is alert and oriented to person, place, and time.   Skin: Skin is warm and dry.   Psychiatric: She has a normal mood and affect. Her behavior is normal.   Nursing note and vitals reviewed.      Procedures         ED Course  ED Course as of Sep 15 1652   Sun Sep 15, 2019   1334 Spoke with Dr. Patiño, the patient's ophthalmologist, who advises the patient can lay down and she has no post-operative restrictions except no anticoagulation. -EIR  [HF]      ED Course User Index  [HF] Candy Freitas       Recent Results (from the past 24 hour(s))   Comprehensive Metabolic Panel    Collection Time: 09/15/19 12:08 PM   Result Value Ref Range    Glucose 101 (H) 65 - 99 mg/dL    BUN 16 8 - 23 mg/dL    Creatinine 0.89 0.57 - 1.00 mg/dL    Sodium 141 136 - 145 mmol/L    Potassium 4.4 3.5 - 5.2 mmol/L    Chloride 105 98 - 107 mmol/L    CO2 25.0 22.0 - 29.0 mmol/L    Calcium 9.4 8.6 - 10.5 mg/dL    Total Protein 6.9 6.0 - 8.5 g/dL    Albumin 3.90 3.50 - 5.20 g/dL    ALT (SGPT) 20 1 - 33 U/L    AST (SGOT) 27 1 - 32 U/L    Alkaline Phosphatase 107 39 - 117 U/L    Total Bilirubin 0.3 0.2 - 1.2 mg/dL    eGFR Non African Amer 62 >60 mL/min/1.73    Globulin 3.0 gm/dL    A/G Ratio 1.3 g/dL    BUN/Creatinine Ratio 18.0 7.0 - 25.0    Anion Gap 11.0 5.0 - 15.0 mmol/L   Protime-INR    Collection Time: 09/15/19 12:08 PM   Result Value Ref Range    Protime 12.7 11.2 - 14.3 Seconds    INR 1.00 0.85 - 1.16   CBC Auto Differential    Collection Time: 09/15/19 12:08 PM   Result  Value Ref Range    WBC 6.55 3.40 - 10.80 10*3/mm3    RBC 4.82 3.77 - 5.28 10*6/mm3    Hemoglobin 13.7 12.0 - 15.9 g/dL    Hematocrit 43.0 34.0 - 46.6 %    MCV 89.2 79.0 - 97.0 fL    MCH 28.4 26.6 - 33.0 pg    MCHC 31.9 31.5 - 35.7 g/dL    RDW 13.5 12.3 - 15.4 %    RDW-SD 43.8 37.0 - 54.0 fl    MPV 9.4 6.0 - 12.0 fL    Platelets 331 140 - 450 10*3/mm3    Neutrophil % 67.7 42.7 - 76.0 %    Lymphocyte % 19.2 (L) 19.6 - 45.3 %    Monocyte % 9.3 5.0 - 12.0 %    Eosinophil % 3.2 0.3 - 6.2 %    Basophil % 0.3 0.0 - 1.5 %    Immature Grans % 0.3 0.0 - 0.5 %    Neutrophils, Absolute 4.43 1.70 - 7.00 10*3/mm3    Lymphocytes, Absolute 1.26 0.70 - 3.10 10*3/mm3    Monocytes, Absolute 0.61 0.10 - 0.90 10*3/mm3    Eosinophils, Absolute 0.21 0.00 - 0.40 10*3/mm3    Basophils, Absolute 0.02 0.00 - 0.20 10*3/mm3    Immature Grans, Absolute 0.02 0.00 - 0.05 10*3/mm3    nRBC 0.0 0.0 - 0.2 /100 WBC     Note: In addition to lab results from this visit, the labs listed above may include labs taken at another facility or during a different encounter within the last 24 hours. Please correlate lab times with ED admission and discharge times for further clarification of the services performed during this visit.    US Non-ob Transvaginal   Preliminary Result   Enlarged abnormal-appearing uterus with submucosal or   endometrial focus measuring 7 x 5.5 x 6.8 cm of mixed solid and cystic   heterogeneity with some internal flow on Doppler interrogation   concerning for complex heterogeneous thickening of the endometrium or a   submucosal partially degenerative leiomyoma/fibroid. Follow-up with   OB/GYN consultation recommended. Ovaries without evidence for torsion or   free fluid in the cul-de-sac.        DICTATED:   09/15/2019   EDITED/ls :   09/15/2019             Vitals:    09/15/19 1115 09/15/19 1520   BP: 137/65 115/76   BP Location: Left arm    Patient Position: Sitting    Pulse: 87    Resp: 16    Temp: 99 °F (37.2 °C)    TempSrc: Oral   "  SpO2: 96% 98%   Weight: 56.7 kg (125 lb)    Height: 162.6 cm (64\")      Medications - No data to display  ECG/EMG Results (last 24 hours)     ** No results found for the last 24 hours. **        No orders to display                     MDM  Number of Diagnoses or Management Options  Post-menopausal bleeding:   Uterine mass:   Diagnosis management comments:       I reviewed all available studies at bedside with the patient and her .  Her labs are reassuring.  Her ultrasound however does show a uterine mass looks mucosal.  This is fairly impressive and I think is the cause of her bleeding but the etiology of this mass is unclear.    Her bleeding is subsided a lot here in the ER and she is hemodynamically stable.    I spoke to Dr. Patiño, the patient's ophthalmologist.  Ophthalmologic point of view with her recent left eye surgery she is fine to lay in Trendelenburg or have any sort of procedure she needs.  Could also be anticoagulated if needed.    In speaking with Dr. Lam he would like to see the patient in office tomorrow afternoon he anticipates need for hysterectomy.  I discussed all this in detail with the patient and her  they will return to the ER if worse in any way.    I wrote her a short course of pain medicine if she develops pain or cramping unrelieved by Tylenol.  He Boo was unavailable at time of the dictation.    All are agreeable with plan       Amount and/or Complexity of Data Reviewed  Clinical lab tests: reviewed  Tests in the radiology section of CPT®: reviewed        Final diagnoses:   Post-menopausal bleeding   Uterine mass       Documentation assistance provided by ron Freitas.  Information recorded by the scribsadi was done at my direction and has been verified and validated by me.     Candy Freitas  09/15/19 1331       Will Navarro MD  09/15/19 1652    "

## 2019-09-16 ENCOUNTER — OFFICE VISIT (OUTPATIENT)
Dept: OBSTETRICS AND GYNECOLOGY | Facility: CLINIC | Age: 79
End: 2019-09-16

## 2019-09-16 VITALS
HEIGHT: 64 IN | SYSTOLIC BLOOD PRESSURE: 116 MMHG | WEIGHT: 125.6 LBS | DIASTOLIC BLOOD PRESSURE: 70 MMHG | BODY MASS INDEX: 21.44 KG/M2

## 2019-09-16 DIAGNOSIS — N95.0 PMB (POSTMENOPAUSAL BLEEDING): ICD-10-CM

## 2019-09-16 DIAGNOSIS — D25.0 SUBMUCOUS LEIOMYOMA OF UTERUS: ICD-10-CM

## 2019-09-16 DIAGNOSIS — Z78.0 MENOPAUSE: Primary | ICD-10-CM

## 2019-09-16 LAB
CANCER AG125 SERPL QL: 36.6 U/ML (ref 0–38.1)
CEA SERPL-MCNC: 0.82 NG/ML

## 2019-09-16 PROCEDURE — 99214 OFFICE O/P EST MOD 30 MIN: CPT | Performed by: OBSTETRICS & GYNECOLOGY

## 2019-09-16 NOTE — PROGRESS NOTES
Chief Complaint   Patient presents with   • Vaginal Bleeding     post-menopausal bleeding, patient was seen in ED yesterday       Yin Siddiqui is a 78 y.o. year old  presenting to be seen because of follow-up after an emergency department visit yesterday at [facility] for postmenopausal bleeding.  This patient had postmenopausal bleeding in May 2018 and underwent a hysteroscopy D&C with polypectomy.  Her bleeding resolved until yesterday.  She was seen in the emergency department and was hemodynamically stable.  Her H&H was 13.7/43%.  She had a pelvic ultrasound revealing a large, 7 cm submucosal uterine leiomyoma.  Her CEA was 0.82 and her CA-125 was 36.6.  She denies bowel or urinary symptoms.  She has recently had a gas bubble placed on her left eye and has been quite weak since that time.    Social History     Substance and Sexual Activity   Sexual Activity Yes   • Partners: Male   • Birth control/protection: Post-menopausal     SCREENING TESTS    Year 2012   Age                         PAP                         HPV high risk                         Mammogram        benign                 RASHAD score                         Breast MRI                         Lipids                         Vitamin D                         Colonoscopy                         DEXA  Frax (hip/any)                         Ovarian Screen        normal                     No Additional Complaints Reported    The following portions of the patient's history were reviewed and updated as appropriate:vital signs and   She  has a past medical history of Degenerative disc disease, lumbar, Dyslipidemia, GERD (gastroesophageal reflux disease), Menopause, Osteopenia, Urge incontinence, and Vaginal atrophy.  She has Menopause on their pertinent problem list.  She  has a past surgical history that includes Eye  "surgery; Tonsillectomy; Hand surgery; Cyst Removal; Cataract extraction w/ intraocular lens  implant, bilateral; and d&c hysteroscopy.  Her family history includes Breast cancer (age of onset: 78) in her mother.  She  reports that she has never smoked. She has never used smokeless tobacco. She reports that she does not drink alcohol or use drugs.  Current Outpatient Medications   Medication Sig Dispense Refill   • donepezil (ARICEPT) 10 MG tablet Take 5 mg by mouth Every Night.     • HAVRIX 1440 EL U/ML vaccine      • HYDROcodone-acetaminophen (NORCO) 5-325 MG per tablet Take 0.5 tablets by mouth Every 6 (Six) Hours As Needed for Moderate Pain . 8 tablet 0   • LORazepam (ATIVAN) 1 MG tablet Take 1 mg by mouth daily.     • naproxen (NAPROSYN) 375 MG tablet Take 375 mg by mouth 2 (two) times a day as needed for mild pain (1-3).     • SHINGRIX 50 MCG/0.5ML reconstituted suspension      • simvastatin (ZOCOR) 10 MG tablet Take 10 mg by mouth every night.     • tolterodine (DETROL) 2 MG tablet Take 1 tablet by mouth Daily. 30 tablet 11     No current facility-administered medications for this visit.      She has No Known Allergies..        Review of Systems  A comprehensive review of systems was done.  Constitutional: fatigue  Respiratory: negative  Cardiovascular: negative  Gastrointestinal: negative  Genitourinary:negative  Musculoskeletal:negative  Behavioral/Psych: negative          /70   Ht 162.6 cm (64\")   Wt 57 kg (125 lb 9.6 oz)   Breastfeeding? No   BMI 21.56 kg/m²     Physical Exam    General:  alert; cooperative; well developed; well nourished  thin   Skin:  No suspicious lesions seen   Thyroid: normal to inspection and palpation   Lungs:  clear to auscultation bilaterally   Heart:  regular rate and rhythm, S1, S2 normal, no murmur, click, rub or gallop   Breasts:  Not performed.   Abdomen: soft, non-tender; no masses  no umbilical or inguinal hernias are present  no hepato-splenomegaly   Pelvis: " Clinical staff was present for exam  External genitalia:  normal appearance of the external genitalia including Bartholin's and Hartsel's glands.  Vaginal:  blood present -  dark red;  Cervix:  normal appearance.  Uterus:  symmetrically enlarged, consisent with 8 weeks size;  Adnexa:  non palpable bilaterally.     Lab Review   CBC results, CMP results and CA-125 and CEA results    Imaging   Pelvic ultrasound results- 7 cm submucosal mass    Medical counseling was given to patient and  for the following topics: diagnostic results, instructions for management, risk factor reductions, impressions, risks and benefits of treatment options and importance of treatment compliance . Total time of the encounter was 27 minute(s) and 21 minute(s)  was spent in face to face counseling.  I have counseled the patient and her  that she has a submucosal mass that may be either a benign leiomyoma or a sarcoma.  I have counseled her that this is occurred fairly rapidly since her hysteroscopy D&C done in May 2018 for a benign endometrial polyp.  I have counseled her that hysterectomy must be done.  I have counseled her that we would remove her tubes and ovaries at the same time.  I have counseled them that I will need to talk to Dr. Claudio to determine whether she feels that she should do the procedure or I will do the procedure?  I have counseled her that her CA-125 is slightly elevated at 36.6 and her CEA is normal.  I have counseled them that her ovaries are normal on ultrasound.  I have given them pamphlets about hysterectomy including robotically assisted-laparoscopic hysterectomy.  Counseled her about the risks and benefits of surgery.          ASSESSMENT  Problems Addressed this Visit        Genitourinary    Menopause - Primary      Other Visit Diagnoses     PMB (postmenopausal bleeding)        Submucous leiomyoma of uterus               Substance History:    reports that she has never smoked. She has never used  smokeless tobacco.    reports that she does not drink alcohol.    reports that she does not use drugs.    Substance use counseling is not indicated based on patient history.      PLAN    · Medications prescribed this encounter:  No orders of the defined types were placed in this encounter.  · She will continue to get rest so that her I can heal.  · Pamphlets about hysterectomy and about uterine leiomyomata  · Follow up: Will call the patient tomorrow about surgery scheduling  · Calcium, 600 mg/ Vit. D, 400 IU daily     *Please note that portions of this documentation may have been completed with a voice recognition program.  Efforts were made to edit this dictation, but occasional words may have been mistranscribed.     This note was electronically signed.    TERRY Lam MD  September 16, 2019  5:02 PM

## 2019-09-17 ENCOUNTER — HOSPITAL ENCOUNTER (OUTPATIENT)
Dept: CT IMAGING | Facility: HOSPITAL | Age: 79
End: 2019-09-17

## 2019-09-17 DIAGNOSIS — N95.0 PMB (POSTMENOPAUSAL BLEEDING): ICD-10-CM

## 2019-09-17 DIAGNOSIS — D25.0 SUBMUCOUS LEIOMYOMA OF UTERUS: Primary | ICD-10-CM

## 2019-09-18 ENCOUNTER — OFFICE VISIT (OUTPATIENT)
Dept: OBSTETRICS AND GYNECOLOGY | Facility: CLINIC | Age: 79
End: 2019-09-18

## 2019-09-18 ENCOUNTER — HOSPITAL ENCOUNTER (OUTPATIENT)
Dept: CT IMAGING | Facility: HOSPITAL | Age: 79
Discharge: HOME | End: 2019-09-18
Admitting: OBSTETRICS & GYNECOLOGY

## 2019-09-18 VITALS
SYSTOLIC BLOOD PRESSURE: 128 MMHG | WEIGHT: 125 LBS | HEIGHT: 64 IN | DIASTOLIC BLOOD PRESSURE: 80 MMHG | BODY MASS INDEX: 21.34 KG/M2

## 2019-09-18 DIAGNOSIS — D25.0 SUBMUCOUS LEIOMYOMA OF UTERUS: ICD-10-CM

## 2019-09-18 DIAGNOSIS — Z78.0 MENOPAUSE: Primary | ICD-10-CM

## 2019-09-18 DIAGNOSIS — N95.0 PMB (POSTMENOPAUSAL BLEEDING): ICD-10-CM

## 2019-09-18 PROCEDURE — 74177 CT ABD & PELVIS W/CONTRAST: CPT

## 2019-09-18 PROCEDURE — 25010000002 IOPAMIDOL 61 % SOLUTION: Performed by: OBSTETRICS & GYNECOLOGY

## 2019-09-18 PROCEDURE — 58100 BIOPSY OF UTERUS LINING: CPT | Performed by: OBSTETRICS & GYNECOLOGY

## 2019-09-18 RX ORDER — FERROUS SULFATE 325(65) MG
325 TABLET ORAL
Qty: 30 TABLET | Refills: 3 | Status: SHIPPED | OUTPATIENT
Start: 2019-09-18 | End: 2019-10-28

## 2019-09-18 RX ADMIN — IOPAMIDOL 85 ML: 612 INJECTION, SOLUTION INTRAVENOUS at 11:18

## 2019-09-18 NOTE — PROGRESS NOTES
PROCEDURE DATE: 2019  Chief Complaint   Patient presents with   • Procedure     endometrial biopsy       Yin Siddiqui is a 78 y.o. year old  presenting to be seen for appointment to have an endometrial biopsy.  This patient had a hysteroscopy D&C polypectomy in May 2018.  She developed a recurrence of postmenopausal bleeding and was seen in the emergency department at [facility] on 9/15/2019.  A pelvic ultrasound scan revealed a 7 cm submucosal mass.  The patient is hemodynamically stable.  She is here today to have an endometrial biopsy and this afternoon will have a CT scan of the abdomen and pelvis to evaluate lymph node status and the status of her ovaries.  I have explained endometrial biopsy to the patient including the risks of pain, bleeding, and infection.  She voiced understanding of these risks.      Risks and benefits discussed? yes  All questions answered? yes  Consents given by the patient  Written consent obtained? yes    Local anesthesia used:  no    Procedure documentation:          In the exam room the patient was placed in lithotomy position.  A speculum was introduced and the cervix was prepared with Betadine.  A single-tooth tenaculum was placed on the anterior lip of the cervix.                                                       Using a 3 mm Pipelle and endometrial biopsy was obtained.  The tissue was placed in preservative.  There were no complications and the procedure was well-tolerated.  Findings:                                      1) Postmenopausal bleeding [N95.0]                                                       2) Submucosal uterine leiomyoma [D25.0]    Plan:                                            1) Endometrial biopsy                                                      2) CT scan of abdomen and pelvis today                                                      3) Gyn Oncology consult with Dr. Claudio  *Please note that portions of this  documentation may have been completed with a voice recognition program.  Efforts were made to edit this dictation, but occasional words may have been mistranscribed.  This note was electronically signed.    TERRY Lam MD  September 18, 2019  10:18 AM

## 2019-09-19 ENCOUNTER — TELEPHONE (OUTPATIENT)
Dept: OBSTETRICS AND GYNECOLOGY | Facility: CLINIC | Age: 79
End: 2019-09-19

## 2019-09-19 NOTE — TELEPHONE ENCOUNTER
I have called the patient and her  to explain the CT scan which was done yesterday.  I have explained that there are some granulomatous calcifications in the spleen and some multiple gamal-pelvic cysts of the kidneys which appear to be benign.  I have explained to her that I have reviewed the CT scan with Dr. Kate.  There is a 7 cm mass in the endometrial cavity which is extremely vascular from 6:00 to 9:00.  It does appear to have some cystic degeneration.  Dr. Kate does not feel that it has a classic appearance for or a sarcoma.  There was no evidence of invasion into the myometrium and the pelvic lymph nodes were normal.  I have explained to them that Dr. Claudio will see her next week and a decision will be made as to how to proceed regarding her surgical intervention.  I have reassured them that I will contact her after the endometrial biopsy results are back.  I have counseled them to contact us should her bleeding increase.

## 2019-09-24 ENCOUNTER — TELEPHONE (OUTPATIENT)
Dept: GYNECOLOGIC ONCOLOGY | Facility: CLINIC | Age: 79
End: 2019-09-24

## 2019-09-24 NOTE — TELEPHONE ENCOUNTER
----- Message from Nesha Rick sent at 9/24/2019  3:18 PM EDT -----  Regarding: FLU SHOT  Contact: [phone]  PATIENT  PHONED AND WANTED TO KNOW IF THEY CAN GET THEIR FLU SHOTS

## 2019-09-26 ENCOUNTER — APPOINTMENT (OUTPATIENT)
Dept: LAB | Facility: HOSPITAL | Age: 79
End: 2019-09-26

## 2019-09-26 ENCOUNTER — OFFICE VISIT (OUTPATIENT)
Dept: GYNECOLOGIC ONCOLOGY | Facility: CLINIC | Age: 79
End: 2019-09-26

## 2019-09-26 ENCOUNTER — HOSPITAL ENCOUNTER (OUTPATIENT)
Dept: GENERAL RADIOLOGY | Facility: HOSPITAL | Age: 79
Discharge: HOME | End: 2019-09-26
Admitting: OBSTETRICS & GYNECOLOGY

## 2019-09-26 ENCOUNTER — PREP FOR SURGERY (OUTPATIENT)
Dept: GYNECOLOGIC ONCOLOGY | Facility: CLINIC | Age: 79
End: 2019-09-26

## 2019-09-26 ENCOUNTER — APPOINTMENT (OUTPATIENT)
Dept: PREADMISSION TESTING | Facility: HOSPITAL | Age: 79
End: 2019-09-26

## 2019-09-26 ENCOUNTER — PATIENT EDUCATION (SURGERY INSTRUCTIONS) (OUTPATIENT)
Dept: GYNECOLOGIC ONCOLOGY | Facility: CLINIC | Age: 79
End: 2019-09-26

## 2019-09-26 VITALS
SYSTOLIC BLOOD PRESSURE: 145 MMHG | HEIGHT: 63 IN | BODY MASS INDEX: 22.15 KG/M2 | OXYGEN SATURATION: 98 % | RESPIRATION RATE: 12 BRPM | WEIGHT: 125 LBS | TEMPERATURE: 98.1 F | HEART RATE: 68 BPM | DIASTOLIC BLOOD PRESSURE: 66 MMHG

## 2019-09-26 VITALS — HEIGHT: 63 IN | WEIGHT: 125.2 LBS | BODY MASS INDEX: 22.18 KG/M2

## 2019-09-26 DIAGNOSIS — D25.0 SUBMUCOUS LEIOMYOMA OF UTERUS: ICD-10-CM

## 2019-09-26 DIAGNOSIS — R93.89 THICKENED ENDOMETRIUM: ICD-10-CM

## 2019-09-26 DIAGNOSIS — N95.0 PMB (POSTMENOPAUSAL BLEEDING): Primary | ICD-10-CM

## 2019-09-26 DIAGNOSIS — D25.0 SUBMUCOUS LEIOMYOMA OF UTERUS: Primary | ICD-10-CM

## 2019-09-26 DIAGNOSIS — C54.1 ENDOMETRIAL CANCER (HCC): ICD-10-CM

## 2019-09-26 LAB
ABO GROUP BLD: NORMAL
ALBUMIN SERPL-MCNC: 4.4 G/DL (ref 3.5–5.2)
ALBUMIN/GLOB SERPL: 2 G/DL
ALP SERPL-CCNC: 108 U/L (ref 39–117)
ALT SERPL W P-5'-P-CCNC: 13 U/L (ref 1–33)
ANION GAP SERPL CALCULATED.3IONS-SCNC: 11 MMOL/L (ref 5–15)
AST SERPL-CCNC: 24 U/L (ref 1–32)
BASOPHILS # BLD AUTO: 0.01 10*3/MM3 (ref 0–0.2)
BASOPHILS NFR BLD AUTO: 0.2 % (ref 0–1.5)
BILIRUB SERPL-MCNC: 0.4 MG/DL (ref 0.2–1.2)
BLD GP AB SCN SERPL QL: NEGATIVE
BUN BLD-MCNC: 15 MG/DL (ref 8–23)
BUN/CREAT SERPL: 18.3 (ref 7–25)
CALCIUM SPEC-SCNC: 10 MG/DL (ref 8.6–10.5)
CHLORIDE SERPL-SCNC: 105 MMOL/L (ref 98–107)
CO2 SERPL-SCNC: 27 MMOL/L (ref 22–29)
CREAT BLD-MCNC: 0.82 MG/DL (ref 0.57–1)
DEPRECATED RDW RBC AUTO: 45.1 FL (ref 37–54)
EOSINOPHIL # BLD AUTO: 0.18 10*3/MM3 (ref 0–0.4)
EOSINOPHIL NFR BLD AUTO: 3.3 % (ref 0.3–6.2)
ERYTHROCYTE [DISTWIDTH] IN BLOOD BY AUTOMATED COUNT: 13.6 % (ref 12.3–15.4)
GFR SERPL CREATININE-BSD FRML MDRD: 68 ML/MIN/1.73
GLOBULIN UR ELPH-MCNC: 2.2 GM/DL
GLUCOSE BLD-MCNC: 94 MG/DL (ref 65–99)
HBA1C MFR BLD: 5.4 % (ref 4.8–5.6)
HCT VFR BLD AUTO: 44.5 % (ref 34–46.6)
HGB BLD-MCNC: 13.9 G/DL (ref 12–15.9)
IMM GRANULOCYTES # BLD AUTO: 0.01 10*3/MM3 (ref 0–0.05)
IMM GRANULOCYTES NFR BLD AUTO: 0.2 % (ref 0–0.5)
LYMPHOCYTES # BLD AUTO: 1.49 10*3/MM3 (ref 0.7–3.1)
LYMPHOCYTES NFR BLD AUTO: 27.3 % (ref 19.6–45.3)
MCH RBC QN AUTO: 28.4 PG (ref 26.6–33)
MCHC RBC AUTO-ENTMCNC: 31.2 G/DL (ref 31.5–35.7)
MCV RBC AUTO: 90.8 FL (ref 79–97)
MONOCYTES # BLD AUTO: 0.55 10*3/MM3 (ref 0.1–0.9)
MONOCYTES NFR BLD AUTO: 10.1 % (ref 5–12)
NEUTROPHILS # BLD AUTO: 3.22 10*3/MM3 (ref 1.7–7)
NEUTROPHILS NFR BLD AUTO: 58.9 % (ref 42.7–76)
NRBC BLD AUTO-RTO: 0 /100 WBC (ref 0–0.2)
PLATELET # BLD AUTO: 309 10*3/MM3 (ref 140–450)
PMV BLD AUTO: 9.3 FL (ref 6–12)
POTASSIUM BLD-SCNC: 4.5 MMOL/L (ref 3.5–5.2)
PROT SERPL-MCNC: 6.6 G/DL (ref 6–8.5)
RBC # BLD AUTO: 4.9 10*6/MM3 (ref 3.77–5.28)
RH BLD: POSITIVE
SODIUM BLD-SCNC: 143 MMOL/L (ref 136–145)
T&S EXPIRATION DATE: NORMAL
WBC NRBC COR # BLD: 5.46 10*3/MM3 (ref 3.4–10.8)

## 2019-09-26 PROCEDURE — 86900 BLOOD TYPING SEROLOGIC ABO: CPT | Performed by: OBSTETRICS & GYNECOLOGY

## 2019-09-26 PROCEDURE — 71046 X-RAY EXAM CHEST 2 VIEWS: CPT

## 2019-09-26 PROCEDURE — 80053 COMPREHEN METABOLIC PANEL: CPT | Performed by: OBSTETRICS & GYNECOLOGY

## 2019-09-26 PROCEDURE — 86923 COMPATIBILITY TEST ELECTRIC: CPT

## 2019-09-26 PROCEDURE — 83036 HEMOGLOBIN GLYCOSYLATED A1C: CPT | Performed by: ANESTHESIOLOGY

## 2019-09-26 PROCEDURE — 99205 OFFICE O/P NEW HI 60 MIN: CPT | Performed by: OBSTETRICS & GYNECOLOGY

## 2019-09-26 PROCEDURE — 36415 COLL VENOUS BLD VENIPUNCTURE: CPT

## 2019-09-26 PROCEDURE — 86901 BLOOD TYPING SEROLOGIC RH(D): CPT | Performed by: OBSTETRICS & GYNECOLOGY

## 2019-09-26 PROCEDURE — 86850 RBC ANTIBODY SCREEN: CPT | Performed by: OBSTETRICS & GYNECOLOGY

## 2019-09-26 PROCEDURE — 85025 COMPLETE CBC W/AUTO DIFF WBC: CPT | Performed by: OBSTETRICS & GYNECOLOGY

## 2019-09-26 RX ORDER — CHLORAL HYDRATE 500 MG
1000 CAPSULE ORAL
COMMUNITY
End: 2022-07-08

## 2019-09-26 RX ORDER — ACETAMINOPHEN 325 MG/1
650 TABLET ORAL ONCE
Status: CANCELLED | OUTPATIENT
Start: 2019-09-26

## 2019-09-26 RX ORDER — PREGABALIN 25 MG/1
150 CAPSULE ORAL ONCE
Status: CANCELLED | OUTPATIENT
Start: 2019-09-26 | End: 2019-09-26

## 2019-09-26 RX ORDER — SODIUM CHLORIDE, SODIUM LACTATE, POTASSIUM CHLORIDE, CALCIUM CHLORIDE 600; 310; 30; 20 MG/100ML; MG/100ML; MG/100ML; MG/100ML
100 INJECTION, SOLUTION INTRAVENOUS CONTINUOUS
Status: CANCELLED | OUTPATIENT
Start: 2019-09-26

## 2019-09-26 RX ORDER — CELECOXIB 100 MG/1
200 CAPSULE ORAL ONCE
Status: CANCELLED | OUTPATIENT
Start: 2019-09-26

## 2019-09-26 RX ORDER — NAPROXEN 500 MG/1
500 TABLET ORAL 2 TIMES DAILY PRN
COMMUNITY
End: 2019-09-30 | Stop reason: HOSPADM

## 2019-09-26 NOTE — PROGRESS NOTES
Yin Siddiqui  [id number]  1940      Reason for visit: Postmenopausal bleeding, endometrial biopsy concerning for uterine carcinoma    Consultation:  Patient is being seen at the request of Dr. Lam    History of present illness:  The patient is a 78 y.o. year old female who presents today for treatment and evaluation of the above issues.  Patient had a history of postmenopausal bleeding a year ago and underwent a hysteroscopy D&C and polypectomy in May 2018.  The biopsy results at that time came back as benign.  She then had recurrence of the bleeding 2 weeks ago.  She was evaluated in the emergency department with a CT abdomen pelvis and was noted to have a mass likely originating in the uterus.  She was then evaluated by Dr. Lam who did a endometrial biopsy which returned as extensive tumor necrosis with marked cell atypia.  Given this finding she is referred to Dr. Claudio for further management.  She continues to have light vaginal bleeding.  She denies any abdominal pain.  She denies any weight loss.  She denies any lumps or bumps anywhere.      For new patients, [facility] intake form from 19 was reviewed and confirmed today.    OBGYN History:  She is a .  . She has history of HRT ~2 yrs. She does not have a history of abnormal pap smears.  Her last colonoscopy was 2019 which was normal.  She had a mammogram of May 2019 that was normal.      Oncologic History:   No history exists.         Past Medical History:   Diagnosis Date   • Degenerative disc disease, lumbar    • Dyslipidemia    • GERD (gastroesophageal reflux disease)    • Menopause    • Osteopenia    • Urge incontinence    • Vaginal atrophy        Past Surgical History:   Procedure Laterality Date   • CATARACT EXTRACTION W/ INTRAOCULAR LENS  IMPLANT, BILATERAL     • CYST REMOVAL      left wrist   • D&C HYSTEROSCOPY     • EYE SURGERY     • HAND SURGERY     • TONSILLECTOMY         MEDICATIONS: The current medication list was  reviewed with the patient and updated in the EMR this date per the Medical Assistant. Medication dosages and frequencies were confirmed to be accurate.      Allergies:  has No Known Allergies.    Social History:   Social History     Socioeconomic History   • Marital status:      Spouse name: Not on file   • Number of children: Not on file   • Years of education: Not on file   • Highest education level: Not on file   Tobacco Use   • Smoking status: Never Smoker   • Smokeless tobacco: Never Used   Substance and Sexual Activity   • Alcohol use: No   • Drug use: No   • Sexual activity: Yes     Partners: Male     Birth control/protection: Post-menopausal       Family History:    Family History   Problem Relation Age of Onset   • Breast cancer Mother 78   • Ovarian cancer Neg Hx        Health Maintenance:    Health Maintenance   Topic Date Due   • TDAP/TD VACCINES (1 - Tdap) 10/03/1961   • ZOSTER VACCINE (1 of 2) 10/03/1992   • PNEUMOCOCCAL VACCINES (65+ LOW/MEDIUM RISK) (2 of 2 - PPSV23) 01/27/2017   • MEDICARE ANNUAL WELLNESS  04/23/2019   • DXA SCAN  05/23/2019   • INFLUENZA VACCINE  08/01/2019   • MAMMOGRAM  05/15/2021   • COLONOSCOPY  04/16/2029         Review of Systems   Constitutional: Positive for fatigue. Negative for unexpected weight change.   HENT: Negative for ear pain and rhinorrhea.    Eyes: Positive for pain (eye surgery) and visual disturbance. Negative for redness.   Respiratory: Negative for cough and shortness of breath.    Cardiovascular: Negative for chest pain and leg swelling.   Gastrointestinal: Negative for abdominal pain, constipation and diarrhea.   Endocrine: Negative for polydipsia and polyphagia.   Genitourinary: Positive for vaginal bleeding. Negative for flank pain and pelvic pain.   Musculoskeletal: Positive for arthralgias. Negative for myalgias.   Skin: Negative for pallor and rash.   Allergic/Immunologic: Negative for food allergies and immunocompromised state.   Neurological:  "Negative for syncope and light-headedness.   Psychiatric/Behavioral: Negative for dysphoric mood and suicidal ideas.       Physical Exam    Vitals:    09/26/19 1357   BP: 145/66   Pulse: 68   Resp: 12   Temp: 98.1 °F (36.7 °C)   TempSrc: Temporal   SpO2: 98%   Weight: 56.7 kg (125 lb)   Height: 160 cm (63\")   PainSc:   1     Body mass index is 22.14 kg/m².    Wt Readings from Last 3 Encounters:   09/26/19 56.7 kg (125 lb)   09/18/19 56.7 kg (125 lb)   09/16/19 57 kg (125 lb 9.6 oz)         GENERAL: Alert, well-appearing female appearing her stated age who is in no apparent distress.   HEENT: Sclera anicteric. Head normocephalic, atraumatic. Mucus membranes moist.   NECK: Trachea midline, supple, without masses.  No thyromegaly.   BREASTS: Deferred  CARDIOVASCULAR: Normal rate, regular rhythm, no murmurs, rubs, or gallops.  No peripheral edema.  RESPIRATORY: Clear to auscultation bilaterally, normal respiratory effort  BACK:  No CVA tenderness, no vertebral tenderness on palpation  GASTROINTESTINAL:  Abdomen is soft, non-tender, non-distended, no rebound or guarding, no masses, or hernias. No HSM.    SKIN:  Warm, dry, well-perfused.  All visible areas intact.  No rashes, lesions, ulcers.  PSYCHIATRIC: AO x3, with appropriate affect, normal thought processes.  NEUROLOGIC: No focal deficits. Moves extremities well.  MUSCULOSKELETAL: Normal gait and station.   EXTREMITIES:   No cyanosis, clubbing, symmetric.  LYMPHATICS:  No cervical or inguinal adenopathy noted.     PELVIC exam:    GYNECOLOGIC:  External genitalia are free from lesion. On speculum examination, the cervix was free from lesion. On bimanual examination large mass appreciated in the left side.  Uterus was enlarged. There is no cervical motion or uterine tenderness. No cervical mass was palpated. No descensus with the mass.  Uterus feels fixed to left pelvis.  Parametria were smooth. Rectovaginal exam was confirmatory.     ECOG PS 0    PROCEDURES:  " none    Diagnostic Data:    Endometrial biopsy 9/18/2019: Minute fragment of epithelium with marked cytological atypia in the background of extensive tumor necrosis.  The majority of the specimen shows extensive tumor necrosis.  A single viable fragment of intact epithelium is present with marked cytologic atypia.  These features are highly concerning for carcinoma.  However due to limited nature of intact epithelium, definitive characterization is not possible.    Us Non-ob Transvaginal    Result Date: 9/17/2019  Enlarged abnormal-appearing uterus with submucosal or endometrial focus measuring 7 x 5.5 x 6.8 cm of mixed solid and cystic heterogeneity with some internal flow on Doppler interrogation concerning for complex heterogeneous thickening of the endometrium or a submucosal partially degenerative leiomyoma/fibroid. Follow-up with OB/GYN consultation recommended. Ovaries without evidence for torsion or free fluid in the cul-de-sac.   DICTATED:   09/15/2019 EDITED/ls :   09/15/2019  This report was finalized on 9/17/2019 4:35 PM by Dr. Iron Roe.      Ct Abdomen Pelvis With Contrast    Result Date: 9/21/2019  1. Approximately 7 cm central uterine mass, strongly vascular along its posterior lateral right margin at approximately 7:00 as noted above. No evidence of invasion into the surrounding soft tissues. 2. No evidence of adenopathy or other metastatic disease. 3. 2 cm nonenhancing water density right ovarian cyst. 4. Incidentally noted small hepatic cyst and normal variant bilateral renal parapelvic cysts.  D:  09/18/2019 E:  09/18/2019  This report was finalized on 9/21/2019 7:55 AM by DR. Niles Kate MD.        Lab Results   Component Value Date    WBC 6.55 09/15/2019    HGB 13.7 09/15/2019    HCT 43.0 09/15/2019    MCV 89.2 09/15/2019     09/15/2019    NEUTROABS 4.43 09/15/2019    GLUCOSE 101 (H) 09/15/2019    BUN 16 09/15/2019    CREATININE 0.89 09/15/2019    EGFRIFNONA 62 09/15/2019      09/15/2019    K 4.4 09/15/2019     09/15/2019    CO2 25.0 09/15/2019    CALCIUM 9.4 09/15/2019    ALBUMIN 3.90 09/15/2019    AST 27 09/15/2019    ALT 20 09/15/2019    BILITOT 0.3 09/15/2019     Lab Results   Component Value Date     36.6 09/15/2019           Assessment/Plan   This is a 78 y.o. woman with postmenopausal bleeding, endometrial biopsy showing possible uterine carcinoma.      Uterine carcinoma  - CT scan was reviewed by Dr. Claudio. The mass is large heterogenous 66x67 mm in size arising in the uterus.  Differential diagnosis includes endometrial cancer, sarcoma.  -There are concerning findings on examination with tethering at the right pelvis necessitating laparotomy approach as opposed to minimally invasive approach.  - Discussed with the patient about open surgery and staging.   Patient was consented for ex-lap, RAFA, BSO, tumor debulking, possible staging, cystoscopy and temporary ureteral stent placement.       Risks and benefits of surgery were discussed.  This included, but was not limited to, infection and bleeding like when the skin is cut; damage to surrounding structures; and incisional complications.  Risk of DVT was addressed for major surgeries.  Standard of care efforts to minimize these risks were reviewed.  Typical hospital stay and recovery were discussed as well as post-procedure precautions.  Surgical implications of chronic illnesses on recovery and surgical outcome were reviewed.     Pain medication regimen for postoperative care was discussed.  Typical regimen and avoidance of narcotics was discussed.  Patient was educated that other factors, such as existing narcotic use, can impact postoperative pain management.      Risks and benefits of lymph node dissection were further discussed.  This included lymphocyst, hematoma, lymphedema, vascular injury, and nerve injury.    Patient verbalized understanding of the plan including the risks and benefits.  Appropriate  perioperative testing including laboratory evaluation, EKG as clinically indicated, chest x-ray as clinically indicated, and preadmission evaluation were all ordered as a part of this patient's care.    Pain assessment was performed today as a part of patient’s care.  For patients with pain related to surgery, gynecologic malignancy or cancer treatment, the plan is as noted in the assessment/plan.  For patients with pain not related to these issues, they are to seek any further needed care from a more appropriate provider, such as PCP.    No orders of the defined types were placed in this encounter.      FOLLOW UP: surgery    I personally spent 60 minutes face-to-face with the patient of which >50% was spent performing counseling/coordiantion of care.    Patient was seen and examined with Dr. Hernandez,  resident, who performed portions of the examination and documentation for this patient's care under my direct supervision.  I agree with the above documentation and plan.    Dasha Claudio MD  09/27/19  11:04 AM

## 2019-09-26 NOTE — PATIENT INSTRUCTIONS
Gynecologic Oncology  Inpatient Pre-op Patient Education  *See checked boxes for your instructions*    Patient Name:  Yin Siddiqui  [id number]  1940    Surgeon:  Dr. Claudio    Appointment  [x]  1. Your surgery has been scheduled on 9-27-19. You will need to be at the second floor surgery registration of the [facility] hospital on that day at 11:30 am.  Enter the campus grounds through entrance #2, park in [facility], walk up the hill into the hospital and follow that krishna until you see elevators on right, use that elevator to go to the 2nd floor, when the door opens, you will see an arrow to direct you to registration.      [x] 2.  You have a pre-admission testing (PAT) appointment for labs and possibly chest xray and EKG, on 9-26-19 at .  You will need to be at hospital registration on the first floor, 10 minutes before that time.  If your PAT appointment is on Sunday, please enter through the Emergency Department.   [x] 3.  The hospital registration department is located in the long hallway between the [facility] and [facility] buildings.     The Day(s) Before Surgery  [x] 1. On 9-26-19, the day prior to surgery, eat lightly.  No solid food after midnight on 9-26-19, including NO MILK, CREAM, OR ORANGE JUICE.  You may have sips of clear fluids up until four hours prior to your arrival to the hospital on the morning of surgery.     [x] 2.  Please remove fingernail polish, you may leave toenail polish on.   [x] 3.  Do not take vitamins or full dose aspirin one week before surgery.  If you normally take a blood thinning medication such as Warfarin, Eliquis, or Xarelto, we will give you specific instructions regarding these medications and we may need to talk with your other doctors.   [x] 4.  On the morning of your surgery, you may likely take your routine prescription medications with a sip of water as reviewed with you by your surgeon.  Bring your home medications with you to the hospital as we may need to reference  these.  In particular be sure to bring any inhalers.     Post-surgery Instructions  [x] 1.  The length of stay for your type of surgery is typically one hospital night, however it is also possible that you could be discharged home in the evening on the same day depending on the nature of your surgery.  All rooms are private, so family member may stay with you.     [x] 2.  Do not take your own home prescription medication while you are in the hospital unless otherwise instructed.  These will be provided to you.         Comments:

## 2019-09-27 ENCOUNTER — HOSPITAL ENCOUNTER (INPATIENT)
Facility: HOSPITAL | Age: 79
LOS: 3 days | Discharge: HOME | End: 2019-09-30
Attending: OBSTETRICS & GYNECOLOGY | Admitting: OBSTETRICS & GYNECOLOGY

## 2019-09-27 ENCOUNTER — ANESTHESIA (OUTPATIENT)
Dept: PERIOP | Facility: HOSPITAL | Age: 79
End: 2019-09-27

## 2019-09-27 ENCOUNTER — ANESTHESIA EVENT (OUTPATIENT)
Dept: PERIOP | Facility: HOSPITAL | Age: 79
End: 2019-09-27

## 2019-09-27 DIAGNOSIS — M51.36 DEGENERATIVE DISC DISEASE, LUMBAR: ICD-10-CM

## 2019-09-27 DIAGNOSIS — C54.1 ENDOMETRIAL CANCER (HCC): Primary | ICD-10-CM

## 2019-09-27 DIAGNOSIS — D25.0 SUBMUCOUS LEIOMYOMA OF UTERUS: ICD-10-CM

## 2019-09-27 PROBLEM — R93.89 THICKENED ENDOMETRIUM: Status: ACTIVE | Noted: 2019-09-27

## 2019-09-27 PROBLEM — N95.0 PMB (POSTMENOPAUSAL BLEEDING): Status: ACTIVE | Noted: 2019-09-27

## 2019-09-27 LAB
ABO GROUP BLD: NORMAL
RH BLD: POSITIVE

## 2019-09-27 PROCEDURE — 58210 EXTENSIVE HYSTERECTOMY: CPT | Performed by: OBSTETRICS & GYNECOLOGY

## 2019-09-27 PROCEDURE — 25010000002 PHENYLEPHRINE PER 1 ML: Performed by: NURSE ANESTHETIST, CERTIFIED REGISTERED

## 2019-09-27 PROCEDURE — 25010000002 ONDANSETRON PER 1 MG: Performed by: ANESTHESIOLOGY

## 2019-09-27 PROCEDURE — 88331 PATH CONSLTJ SURG 1 BLK 1SPC: CPT | Performed by: PATHOLOGY

## 2019-09-27 PROCEDURE — 88112 CYTOPATH CELL ENHANCE TECH: CPT | Performed by: OBSTETRICS & GYNECOLOGY

## 2019-09-27 PROCEDURE — 88342 IMHCHEM/IMCYTCHM 1ST ANTB: CPT | Performed by: OBSTETRICS & GYNECOLOGY

## 2019-09-27 PROCEDURE — 88307 TISSUE EXAM BY PATHOLOGIST: CPT | Performed by: OBSTETRICS & GYNECOLOGY

## 2019-09-27 PROCEDURE — 88332 PATH CONSLTJ SURG EA ADD BLK: CPT | Performed by: PATHOLOGY

## 2019-09-27 PROCEDURE — 25010000002 FENTANYL CITRATE (PF) 100 MCG/2ML SOLUTION: Performed by: NURSE ANESTHETIST, CERTIFIED REGISTERED

## 2019-09-27 PROCEDURE — 0UT90ZZ RESECTION OF UTERUS, OPEN APPROACH: ICD-10-PCS | Performed by: OBSTETRICS & GYNECOLOGY

## 2019-09-27 PROCEDURE — 25010000002 DEXAMETHASONE PER 1 MG: Performed by: NURSE ANESTHETIST, CERTIFIED REGISTERED

## 2019-09-27 PROCEDURE — 07BC0ZX EXCISION OF PELVIS LYMPHATIC, OPEN APPROACH, DIAGNOSTIC: ICD-10-PCS | Performed by: OBSTETRICS & GYNECOLOGY

## 2019-09-27 PROCEDURE — 25010000002 NEOSTIGMINE 10 MG/10ML SOLUTION: Performed by: ANESTHESIOLOGY

## 2019-09-27 PROCEDURE — 0TJB8ZZ INSPECTION OF BLADDER, VIA NATURAL OR ARTIFICIAL OPENING ENDOSCOPIC: ICD-10-PCS | Performed by: OBSTETRICS & GYNECOLOGY

## 2019-09-27 PROCEDURE — 88305 TISSUE EXAM BY PATHOLOGIST: CPT | Performed by: OBSTETRICS & GYNECOLOGY

## 2019-09-27 PROCEDURE — 88341 IMHCHEM/IMCYTCHM EA ADD ANTB: CPT | Performed by: OBSTETRICS & GYNECOLOGY

## 2019-09-27 PROCEDURE — 86901 BLOOD TYPING SEROLOGIC RH(D): CPT

## 2019-09-27 PROCEDURE — 52332 CYSTOSCOPY AND TREATMENT: CPT | Performed by: OBSTETRICS & GYNECOLOGY

## 2019-09-27 PROCEDURE — 0UT20ZZ RESECTION OF BILATERAL OVARIES, OPEN APPROACH: ICD-10-PCS | Performed by: OBSTETRICS & GYNECOLOGY

## 2019-09-27 PROCEDURE — C1758 CATHETER, URETERAL: HCPCS | Performed by: OBSTETRICS & GYNECOLOGY

## 2019-09-27 PROCEDURE — 25010000003 CEFAZOLIN IN DEXTROSE 2-4 GM/100ML-% SOLUTION: Performed by: OBSTETRICS & GYNECOLOGY

## 2019-09-27 PROCEDURE — 0WBH0ZX EXCISION OF RETROPERITONEUM, OPEN APPROACH, DIAGNOSTIC: ICD-10-PCS | Performed by: OBSTETRICS & GYNECOLOGY

## 2019-09-27 PROCEDURE — 0DBU0ZZ EXCISION OF OMENTUM, OPEN APPROACH: ICD-10-PCS | Performed by: OBSTETRICS & GYNECOLOGY

## 2019-09-27 PROCEDURE — 86900 BLOOD TYPING SEROLOGIC ABO: CPT

## 2019-09-27 PROCEDURE — 0UT70ZZ RESECTION OF BILATERAL FALLOPIAN TUBES, OPEN APPROACH: ICD-10-PCS | Performed by: OBSTETRICS & GYNECOLOGY

## 2019-09-27 PROCEDURE — 25010000002 PROPOFOL 10 MG/ML EMULSION: Performed by: NURSE ANESTHETIST, CERTIFIED REGISTERED

## 2019-09-27 RX ORDER — OXYBUTYNIN CHLORIDE 10 MG/1
10 TABLET, EXTENDED RELEASE ORAL DAILY
Status: DISCONTINUED | OUTPATIENT
Start: 2019-09-28 | End: 2019-09-30 | Stop reason: HOSPADM

## 2019-09-27 RX ORDER — PREDNISOLONE ACETATE 10 MG/ML
1 SUSPENSION/ DROPS OPHTHALMIC 4 TIMES DAILY
COMMUNITY
End: 2019-10-28

## 2019-09-27 RX ORDER — ONDANSETRON 4 MG/1
4 TABLET, FILM COATED ORAL EVERY 6 HOURS PRN
Status: DISCONTINUED | OUTPATIENT
Start: 2019-09-27 | End: 2019-09-30 | Stop reason: HOSPADM

## 2019-09-27 RX ORDER — ONDANSETRON 2 MG/ML
4 INJECTION INTRAMUSCULAR; INTRAVENOUS EVERY 6 HOURS PRN
Status: DISCONTINUED | OUTPATIENT
Start: 2019-09-27 | End: 2019-09-30 | Stop reason: HOSPADM

## 2019-09-27 RX ORDER — FENTANYL CITRATE 50 UG/ML
50 INJECTION, SOLUTION INTRAMUSCULAR; INTRAVENOUS
Status: DISCONTINUED | OUTPATIENT
Start: 2019-09-27 | End: 2019-09-27 | Stop reason: HOSPADM

## 2019-09-27 RX ORDER — MAGNESIUM HYDROXIDE 1200 MG/15ML
LIQUID ORAL AS NEEDED
Status: DISCONTINUED | OUTPATIENT
Start: 2019-09-27 | End: 2019-09-27 | Stop reason: HOSPADM

## 2019-09-27 RX ORDER — FENTANYL CITRATE 50 UG/ML
INJECTION, SOLUTION INTRAMUSCULAR; INTRAVENOUS AS NEEDED
Status: DISCONTINUED | OUTPATIENT
Start: 2019-09-27 | End: 2019-09-27 | Stop reason: SURG

## 2019-09-27 RX ORDER — SODIUM CHLORIDE 0.9 % (FLUSH) 0.9 %
3-10 SYRINGE (ML) INJECTION AS NEEDED
Status: DISCONTINUED | OUTPATIENT
Start: 2019-09-27 | End: 2019-09-27 | Stop reason: HOSPADM

## 2019-09-27 RX ORDER — SODIUM CHLORIDE 9 MG/ML
INJECTION, SOLUTION INTRAVENOUS AS NEEDED
Status: DISCONTINUED | OUTPATIENT
Start: 2019-09-27 | End: 2019-09-27 | Stop reason: HOSPADM

## 2019-09-27 RX ORDER — ATROPINE SULFATE 10 MG/ML
1 SOLUTION/ DROPS OPHTHALMIC 2 TIMES DAILY
COMMUNITY
End: 2019-10-28

## 2019-09-27 RX ORDER — ONDANSETRON 2 MG/ML
INJECTION INTRAMUSCULAR; INTRAVENOUS AS NEEDED
Status: DISCONTINUED | OUTPATIENT
Start: 2019-09-27 | End: 2019-09-27 | Stop reason: SURG

## 2019-09-27 RX ORDER — PROMETHAZINE HYDROCHLORIDE 25 MG/ML
12.5 INJECTION, SOLUTION INTRAMUSCULAR; INTRAVENOUS EVERY 6 HOURS PRN
Status: DISCONTINUED | OUTPATIENT
Start: 2019-09-27 | End: 2019-09-30 | Stop reason: HOSPADM

## 2019-09-27 RX ORDER — PROPOFOL 10 MG/ML
VIAL (ML) INTRAVENOUS AS NEEDED
Status: DISCONTINUED | OUTPATIENT
Start: 2019-09-27 | End: 2019-09-27 | Stop reason: SURG

## 2019-09-27 RX ORDER — ATROPINE SULFATE 10 MG/ML
1 SOLUTION/ DROPS OPHTHALMIC 2 TIMES DAILY
Status: DISCONTINUED | OUTPATIENT
Start: 2019-09-27 | End: 2019-09-30 | Stop reason: HOSPADM

## 2019-09-27 RX ORDER — NALOXONE HCL 0.4 MG/ML
0.4 VIAL (ML) INJECTION
Status: DISCONTINUED | OUTPATIENT
Start: 2019-09-27 | End: 2019-09-30 | Stop reason: HOSPADM

## 2019-09-27 RX ORDER — LABETALOL HYDROCHLORIDE 5 MG/ML
5 INJECTION, SOLUTION INTRAVENOUS
Status: DISCONTINUED | OUTPATIENT
Start: 2019-09-27 | End: 2019-09-27 | Stop reason: HOSPADM

## 2019-09-27 RX ORDER — HYDROMORPHONE HYDROCHLORIDE 1 MG/ML
0.5 INJECTION, SOLUTION INTRAMUSCULAR; INTRAVENOUS; SUBCUTANEOUS
Status: DISCONTINUED | OUTPATIENT
Start: 2019-09-27 | End: 2019-09-27 | Stop reason: HOSPADM

## 2019-09-27 RX ORDER — OXYCODONE HYDROCHLORIDE 5 MG/1
10 TABLET ORAL EVERY 4 HOURS PRN
Status: DISCONTINUED | OUTPATIENT
Start: 2019-09-27 | End: 2019-09-30 | Stop reason: HOSPADM

## 2019-09-27 RX ORDER — PREGABALIN 150 MG/1
150 CAPSULE ORAL ONCE
Status: COMPLETED | OUTPATIENT
Start: 2019-09-27 | End: 2019-09-27

## 2019-09-27 RX ORDER — FAMOTIDINE 20 MG/1
20 TABLET, FILM COATED ORAL
Status: COMPLETED | OUTPATIENT
Start: 2019-09-27 | End: 2019-09-27

## 2019-09-27 RX ORDER — ACETAMINOPHEN 325 MG/1
650 TABLET ORAL ONCE
Status: COMPLETED | OUTPATIENT
Start: 2019-09-27 | End: 2019-09-27

## 2019-09-27 RX ORDER — DEXAMETHASONE SODIUM PHOSPHATE 4 MG/ML
INJECTION, SOLUTION INTRA-ARTICULAR; INTRALESIONAL; INTRAMUSCULAR; INTRAVENOUS; SOFT TISSUE AS NEEDED
Status: DISCONTINUED | OUTPATIENT
Start: 2019-09-27 | End: 2019-09-27 | Stop reason: SURG

## 2019-09-27 RX ORDER — SODIUM CHLORIDE, SODIUM LACTATE, POTASSIUM CHLORIDE, CALCIUM CHLORIDE 600; 310; 30; 20 MG/100ML; MG/100ML; MG/100ML; MG/100ML
50 INJECTION, SOLUTION INTRAVENOUS CONTINUOUS
Status: DISCONTINUED | OUTPATIENT
Start: 2019-09-27 | End: 2019-09-29

## 2019-09-27 RX ORDER — ATORVASTATIN CALCIUM 10 MG/1
10 TABLET, FILM COATED ORAL NIGHTLY
Status: DISCONTINUED | OUTPATIENT
Start: 2019-09-27 | End: 2019-09-30 | Stop reason: HOSPADM

## 2019-09-27 RX ORDER — HYDROMORPHONE HYDROCHLORIDE 1 MG/ML
0.5 INJECTION, SOLUTION INTRAMUSCULAR; INTRAVENOUS; SUBCUTANEOUS
Status: DISCONTINUED | OUTPATIENT
Start: 2019-09-27 | End: 2019-09-30 | Stop reason: HOSPADM

## 2019-09-27 RX ORDER — DONEPEZIL HYDROCHLORIDE 10 MG/1
10 TABLET, FILM COATED ORAL NIGHTLY
Status: DISCONTINUED | OUTPATIENT
Start: 2019-09-27 | End: 2019-09-30 | Stop reason: HOSPADM

## 2019-09-27 RX ORDER — CELECOXIB 200 MG/1
200 CAPSULE ORAL ONCE
Status: COMPLETED | OUTPATIENT
Start: 2019-09-27 | End: 2019-09-27

## 2019-09-27 RX ORDER — PREDNISOLONE ACETATE 10 MG/ML
1 SUSPENSION/ DROPS OPHTHALMIC 4 TIMES DAILY
Status: DISCONTINUED | OUTPATIENT
Start: 2019-09-27 | End: 2019-09-30 | Stop reason: HOSPADM

## 2019-09-27 RX ORDER — ACETAMINOPHEN 325 MG/1
650 TABLET ORAL EVERY 6 HOURS SCHEDULED
Status: DISCONTINUED | OUTPATIENT
Start: 2019-09-28 | End: 2019-09-30 | Stop reason: HOSPADM

## 2019-09-27 RX ORDER — ATRACURIUM BESYLATE 10 MG/ML
INJECTION, SOLUTION INTRAVENOUS AS NEEDED
Status: DISCONTINUED | OUTPATIENT
Start: 2019-09-27 | End: 2019-09-27 | Stop reason: SURG

## 2019-09-27 RX ORDER — PROMETHAZINE HYDROCHLORIDE 12.5 MG/1
12.5 TABLET ORAL EVERY 6 HOURS PRN
Status: DISCONTINUED | OUTPATIENT
Start: 2019-09-27 | End: 2019-09-30 | Stop reason: HOSPADM

## 2019-09-27 RX ORDER — IBUPROFEN 600 MG/1
600 TABLET ORAL EVERY 6 HOURS PRN
Status: DISCONTINUED | OUTPATIENT
Start: 2019-09-27 | End: 2019-09-30 | Stop reason: HOSPADM

## 2019-09-27 RX ORDER — DOCUSATE SODIUM 100 MG/1
100 CAPSULE, LIQUID FILLED ORAL EVERY MORNING
COMMUNITY
End: 2019-09-30 | Stop reason: HOSPADM

## 2019-09-27 RX ORDER — PROMETHAZINE HYDROCHLORIDE 12.5 MG/1
12.5 SUPPOSITORY RECTAL EVERY 6 HOURS PRN
Status: DISCONTINUED | OUTPATIENT
Start: 2019-09-27 | End: 2019-09-30 | Stop reason: HOSPADM

## 2019-09-27 RX ORDER — SODIUM CHLORIDE 0.9 % (FLUSH) 0.9 %
3 SYRINGE (ML) INJECTION EVERY 12 HOURS SCHEDULED
Status: DISCONTINUED | OUTPATIENT
Start: 2019-09-27 | End: 2019-09-27 | Stop reason: HOSPADM

## 2019-09-27 RX ORDER — SODIUM CHLORIDE, SODIUM LACTATE, POTASSIUM CHLORIDE, CALCIUM CHLORIDE 600; 310; 30; 20 MG/100ML; MG/100ML; MG/100ML; MG/100ML
100 INJECTION, SOLUTION INTRAVENOUS CONTINUOUS
Status: DISCONTINUED | OUTPATIENT
Start: 2019-09-27 | End: 2019-09-27

## 2019-09-27 RX ORDER — TEMAZEPAM 7.5 MG/1
7.5 CAPSULE ORAL NIGHTLY PRN
Status: DISCONTINUED | OUTPATIENT
Start: 2019-09-27 | End: 2019-09-30 | Stop reason: HOSPADM

## 2019-09-27 RX ORDER — SODIUM CHLORIDE, SODIUM LACTATE, POTASSIUM CHLORIDE, CALCIUM CHLORIDE 600; 310; 30; 20 MG/100ML; MG/100ML; MG/100ML; MG/100ML
9 INJECTION, SOLUTION INTRAVENOUS CONTINUOUS PRN
Status: DISCONTINUED | OUTPATIENT
Start: 2019-09-27 | End: 2019-09-27 | Stop reason: HOSPADM

## 2019-09-27 RX ORDER — OXYCODONE HYDROCHLORIDE 5 MG/1
5 TABLET ORAL EVERY 4 HOURS PRN
Status: DISCONTINUED | OUTPATIENT
Start: 2019-09-27 | End: 2019-09-30 | Stop reason: HOSPADM

## 2019-09-27 RX ORDER — GLYCOPYRROLATE 0.2 MG/ML
INJECTION INTRAMUSCULAR; INTRAVENOUS AS NEEDED
Status: DISCONTINUED | OUTPATIENT
Start: 2019-09-27 | End: 2019-09-27 | Stop reason: SURG

## 2019-09-27 RX ORDER — LIDOCAINE HYDROCHLORIDE 10 MG/ML
INJECTION, SOLUTION EPIDURAL; INFILTRATION; INTRACAUDAL; PERINEURAL AS NEEDED
Status: DISCONTINUED | OUTPATIENT
Start: 2019-09-27 | End: 2019-09-27 | Stop reason: SURG

## 2019-09-27 RX ORDER — BUPIVACAINE HYDROCHLORIDE 2.5 MG/ML
INJECTION, SOLUTION EPIDURAL; INFILTRATION; INTRACAUDAL
Status: COMPLETED | OUTPATIENT
Start: 2019-09-27 | End: 2019-09-27

## 2019-09-27 RX ORDER — NALOXONE HCL 0.4 MG/ML
0.1 VIAL (ML) INJECTION
Status: DISCONTINUED | OUTPATIENT
Start: 2019-09-27 | End: 2019-09-30 | Stop reason: HOSPADM

## 2019-09-27 RX ORDER — NEOSTIGMINE METHYLSULFATE 1 MG/ML
INJECTION, SOLUTION INTRAVENOUS AS NEEDED
Status: DISCONTINUED | OUTPATIENT
Start: 2019-09-27 | End: 2019-09-27 | Stop reason: SURG

## 2019-09-27 RX ORDER — LIDOCAINE HYDROCHLORIDE 10 MG/ML
0.5 INJECTION, SOLUTION EPIDURAL; INFILTRATION; INTRACAUDAL; PERINEURAL ONCE AS NEEDED
Status: COMPLETED | OUTPATIENT
Start: 2019-09-27 | End: 2019-09-27

## 2019-09-27 RX ORDER — POLYETHYLENE GLYCOL 3350 17 G/17G
17 POWDER, FOR SOLUTION ORAL DAILY
COMMUNITY
End: 2020-09-08

## 2019-09-27 RX ORDER — ONDANSETRON 2 MG/ML
4 INJECTION INTRAMUSCULAR; INTRAVENOUS ONCE AS NEEDED
Status: DISCONTINUED | OUTPATIENT
Start: 2019-09-27 | End: 2019-09-27 | Stop reason: HOSPADM

## 2019-09-27 RX ORDER — CEFAZOLIN SODIUM 2 G/100ML
2 INJECTION, SOLUTION INTRAVENOUS ONCE
Status: COMPLETED | OUTPATIENT
Start: 2019-09-27 | End: 2019-09-27

## 2019-09-27 RX ADMIN — GLYCOPYRROLATE 0.4 MG: 0.2 INJECTION, SOLUTION INTRAMUSCULAR; INTRAVENOUS at 18:26

## 2019-09-27 RX ADMIN — NEOSTIGMINE METHYLSULFATE 3 MG: 1 INJECTION, SOLUTION INTRAVENOUS at 18:26

## 2019-09-27 RX ADMIN — CELECOXIB 200 MG: 200 CAPSULE ORAL at 12:08

## 2019-09-27 RX ADMIN — PROPOFOL 120 MG: 10 INJECTION, EMULSION INTRAVENOUS at 16:07

## 2019-09-27 RX ADMIN — LIDOCAINE HYDROCHLORIDE 0.2 ML: 10 INJECTION, SOLUTION EPIDURAL; INFILTRATION; INTRACAUDAL; PERINEURAL at 11:55

## 2019-09-27 RX ADMIN — DEXAMETHASONE SODIUM PHOSPHATE 8 MG: 4 INJECTION, SOLUTION INTRAMUSCULAR; INTRAVENOUS at 16:07

## 2019-09-27 RX ADMIN — SODIUM CHLORIDE 500 ML: 9 INJECTION, SOLUTION INTRAVENOUS at 23:48

## 2019-09-27 RX ADMIN — SODIUM CHLORIDE, POTASSIUM CHLORIDE, SODIUM LACTATE AND CALCIUM CHLORIDE 9 ML/HR: 600; 310; 30; 20 INJECTION, SOLUTION INTRAVENOUS at 11:55

## 2019-09-27 RX ADMIN — ONDANSETRON 4 MG: 2 INJECTION INTRAMUSCULAR; INTRAVENOUS at 18:26

## 2019-09-27 RX ADMIN — FENTANYL CITRATE 50 MCG: 50 INJECTION INTRAMUSCULAR; INTRAVENOUS at 19:34

## 2019-09-27 RX ADMIN — CEFAZOLIN SODIUM 2 G: 2 INJECTION, SOLUTION INTRAVENOUS at 16:15

## 2019-09-27 RX ADMIN — TEMAZEPAM 7.5 MG: 7.5 CAPSULE ORAL at 22:22

## 2019-09-27 RX ADMIN — LIDOCAINE HYDROCHLORIDE 60 MG: 10 INJECTION, SOLUTION EPIDURAL; INFILTRATION; INTRACAUDAL; PERINEURAL at 16:07

## 2019-09-27 RX ADMIN — FENTANYL CITRATE 100 MCG: 50 INJECTION, SOLUTION INTRAMUSCULAR; INTRAVENOUS at 16:07

## 2019-09-27 RX ADMIN — ACETAMINOPHEN 650 MG: 325 TABLET ORAL at 23:36

## 2019-09-27 RX ADMIN — DONEPEZIL HYDROCHLORIDE 10 MG: 10 TABLET, FILM COATED ORAL at 21:08

## 2019-09-27 RX ADMIN — FENTANYL CITRATE 50 MCG: 50 INJECTION INTRAMUSCULAR; INTRAVENOUS at 19:24

## 2019-09-27 RX ADMIN — FAMOTIDINE 20 MG: 20 TABLET ORAL at 12:00

## 2019-09-27 RX ADMIN — FENTANYL CITRATE 50 MCG: 50 INJECTION INTRAMUSCULAR; INTRAVENOUS at 18:55

## 2019-09-27 RX ADMIN — PHENYLEPHRINE HYDROCHLORIDE 1 MCG/KG/MIN: 10 INJECTION INTRAVENOUS at 16:34

## 2019-09-27 RX ADMIN — OXYCODONE HYDROCHLORIDE 10 MG: 5 TABLET ORAL at 21:08

## 2019-09-27 RX ADMIN — ATROPINE SULFATE 1 DROP: 10 SOLUTION OPHTHALMIC at 22:09

## 2019-09-27 RX ADMIN — ATRACURIUM BESYLATE 40 MG: 10 INJECTION, SOLUTION INTRAVENOUS at 16:07

## 2019-09-27 RX ADMIN — PHENYLEPHRINE HYDROCHLORIDE 200 MCG: 10 INJECTION INTRAVENOUS at 16:33

## 2019-09-27 RX ADMIN — SODIUM CHLORIDE, POTASSIUM CHLORIDE, SODIUM LACTATE AND CALCIUM CHLORIDE 75 ML/HR: 600; 310; 30; 20 INJECTION, SOLUTION INTRAVENOUS at 21:09

## 2019-09-27 RX ADMIN — PREDNISOLONE ACETATE 1 DROP: 10 SUSPENSION/ DROPS OPHTHALMIC at 22:09

## 2019-09-27 RX ADMIN — ATORVASTATIN CALCIUM 10 MG: 10 TABLET, FILM COATED ORAL at 21:08

## 2019-09-27 RX ADMIN — PREGABALIN 150 MG: 150 CAPSULE ORAL at 12:00

## 2019-09-27 RX ADMIN — BUPIVACAINE HYDROCHLORIDE 50 ML: 2.5 INJECTION, SOLUTION EPIDURAL; INFILTRATION; INTRACAUDAL; PERINEURAL at 16:09

## 2019-09-27 RX ADMIN — ACETAMINOPHEN 650 MG: 325 TABLET ORAL at 11:59

## 2019-09-27 RX ADMIN — FENTANYL CITRATE 50 MCG: 50 INJECTION INTRAMUSCULAR; INTRAVENOUS at 19:05

## 2019-09-27 NOTE — ANESTHESIA PREPROCEDURE EVALUATION
Anesthesia Evaluation     Patient summary reviewed and Nursing notes reviewed   NPO Solid Status: > 8 hours  NPO Liquid Status: > 8 hours           Airway   Mallampati: II  TM distance: >3 FB  Neck ROM: full  No difficulty expected  Dental    (+) upper dentures    Pulmonary    (-) COPD, asthma, shortness of breath, recent URI, not a smoker  Cardiovascular     ECG reviewed    (+) hyperlipidemia,   (-) hypertension, past MI, angina    ROS comment: EKG  NSR PVC's     Neuro/Psych  (+) dementia (Aricept minimal dementia),     (-) seizures, CVA  GI/Hepatic/Renal/Endo    (+)  GERD,    (-) liver disease, no renal disease, diabetes, hypothyroidism    Musculoskeletal     Abdominal    Substance History      OB/GYN      Comment: PMB       Other   (+) arthritis   history of cancer (endometrial ca)                    Anesthesia Plan    ASA 3     general with block   (TAPblocks , Propofol infusion,  Opiate sparing    POCD risk (aim for BIS >45 and MAP>65))  intravenous induction   Anesthetic plan, all risks, benefits, and alternatives have been provided, discussed and informed consent has been obtained with: patient.    Plan discussed with CRNA.

## 2019-09-27 NOTE — ANESTHESIA POSTPROCEDURE EVALUATION
Patient: Yin Siddiqui    Procedure Summary     Date:  09/27/19 Room / Location:   [facility] 18 /  [facility]    Anesthesia Start:  1601 Anesthesia Stop:  1841    Procedures:       EXPLORATORY LAPAROTOMY TYPE 1, RADICAL TOTAL ABDOMINAL HYSTERECTOMY, BILATERAL SALPINGO OOPHORECTOMY, PELVIC LYMPH NODE DISSECTION, OMENTUMECTOMY (Bilateral Abdomen)      CYSTOSCOPY WITH BILATERAL TEMPORARY URETERAL STENT INSERTION (Bilateral Urethra) Diagnosis:       Submucous leiomyoma of uterus      (Submucous leiomyoma of uterus [D25.0])    Surgeon:  Dasha Claudio MD Provider:  Steve Willard MD    Anesthesia Type:  general with block ASA Status:  3          Anesthesia Type: general with block  Last vitals  BP   146/66 (09/27/19 1150)   Temp   97.6 °F (36.4 °C) (09/27/19 1150)   Pulse   (!) 18 (09/27/19 1150)   Resp   16 (09/27/19 1150)     SpO2   100 % (09/27/19 1150)     Post Anesthesia Care and Evaluation    Patient location during evaluation: PACU  Patient participation: complete - patient participated  Level of consciousness: awake and alert  Pain score: 0  Pain management: adequate  Airway patency: patent  Anesthetic complications: No anesthetic complications  PONV Status: none  Cardiovascular status: hemodynamically stable and acceptable  Respiratory status: nonlabored ventilation, acceptable and nasal cannula  Hydration status: acceptable

## 2019-09-27 NOTE — ANESTHESIA PROCEDURE NOTES
Airway  Urgency: elective    Date/Time: 9/27/2019 4:09 PM  Airway not difficult    General Information and Staff    Patient location during procedure: OR    Indications and Patient Condition  Indications for airway management: airway protection    Preoxygenated: yes  MILS not maintained throughout  Mask difficulty assessment: 1 - vent by mask    Final Airway Details  Final airway type: endotracheal airway      Successful airway: ETT  Cuffed: yes   Successful intubation technique: direct laryngoscopy  Endotracheal tube insertion site: oral  Blade: Arnulfo  Blade size: 3  ETT size (mm): 6.5  Cormack-Lehane Classification: grade I - full view of glottis  Placement verified by: chest auscultation and capnometry   Number of attempts at approach: 1

## 2019-09-27 NOTE — ANESTHESIA PROCEDURE NOTES
Peripheral Block      Patient reassessed immediately prior to procedure    Patient location during procedure: OR  Reason for block: at surgeon's request and post-op pain management  Preanesthetic Checklist  Completed: patient identified, site marked, surgical consent, pre-op evaluation, timeout performed, IV checked, risks and benefits discussed and monitors and equipment checked  Prep:  Pt Position: supine  Sterile barriers:cap, gloves, sterile barriers and mask  Prep: ChloraPrep  Patient monitoring: blood pressure monitoring, continuous pulse oximetry and EKG  Procedure  Sedation:yes  Performed under: general  Guidance:ultrasound guided  Images:still images obtained, printed/placed on chart    Laterality:Bilateral  Block Type:TAP  Injection Technique:single-shot  Needle Type:short-bevel and echogenic  Needle Gauge:20 G  Resistance on Injection: none    Medications Used: bupivacaine PF (MARCAINE) 0.25 % injection, 50 mL  Med admintered at 9/27/2019 4:09 PM      Medications  Comment:Block Injection:  LA dose divided between Right and Left block        Post Assessment  Injection Assessment: negative aspiration for heme, incremental injection and no paresthesia on injection  Patient Tolerance:comfortable throughout block  Complications:no  Additional Notes      Under Ultrasound guidance, a BBraun 4inch 360 degree needle was advanced with Normal Saline hydro dissection of tissue.  The Internal Oblique and Transversus Abdominus muscles where visualized.  At or before the aponeurosis of Internal Oblique, local anesthetic spread was visualized in the Transversus Abdominus Plane. Injection was made incrementally with aspiration every 5 mls.  There was no  intravascular injection,  injection pressure was normal, there was no neural injection, and the procedure was completed without difficulty.  Thank You.

## 2019-09-28 LAB
ANION GAP SERPL CALCULATED.3IONS-SCNC: 8 MMOL/L (ref 5–15)
BASOPHILS # BLD AUTO: 0 10*3/MM3 (ref 0–0.2)
BASOPHILS NFR BLD AUTO: 0 % (ref 0–1.5)
BUN BLD-MCNC: 11 MG/DL (ref 8–23)
BUN/CREAT SERPL: 15.5 (ref 7–25)
CALCIUM SPEC-SCNC: 8.2 MG/DL (ref 8.6–10.5)
CHLORIDE SERPL-SCNC: 101 MMOL/L (ref 98–107)
CO2 SERPL-SCNC: 25 MMOL/L (ref 22–29)
CREAT BLD-MCNC: 0.71 MG/DL (ref 0.57–1)
DEPRECATED RDW RBC AUTO: 44.3 FL (ref 37–54)
EOSINOPHIL # BLD AUTO: 0 10*3/MM3 (ref 0–0.4)
EOSINOPHIL NFR BLD AUTO: 0 % (ref 0.3–6.2)
ERYTHROCYTE [DISTWIDTH] IN BLOOD BY AUTOMATED COUNT: 13.6 % (ref 12.3–15.4)
GFR SERPL CREATININE-BSD FRML MDRD: 80 ML/MIN/1.73
GLUCOSE BLD-MCNC: 124 MG/DL (ref 65–99)
HCT VFR BLD AUTO: 36.7 % (ref 34–46.6)
HGB BLD-MCNC: 11.7 G/DL (ref 12–15.9)
IMM GRANULOCYTES # BLD AUTO: 0.04 10*3/MM3 (ref 0–0.05)
IMM GRANULOCYTES NFR BLD AUTO: 0.4 % (ref 0–0.5)
LYMPHOCYTES # BLD AUTO: 1.04 10*3/MM3 (ref 0.7–3.1)
LYMPHOCYTES NFR BLD AUTO: 11.6 % (ref 19.6–45.3)
MCH RBC QN AUTO: 28.6 PG (ref 26.6–33)
MCHC RBC AUTO-ENTMCNC: 31.9 G/DL (ref 31.5–35.7)
MCV RBC AUTO: 89.7 FL (ref 79–97)
MONOCYTES # BLD AUTO: 0.76 10*3/MM3 (ref 0.1–0.9)
MONOCYTES NFR BLD AUTO: 8.5 % (ref 5–12)
NEUTROPHILS # BLD AUTO: 7.15 10*3/MM3 (ref 1.7–7)
NEUTROPHILS NFR BLD AUTO: 79.5 % (ref 42.7–76)
NRBC BLD AUTO-RTO: 0 /100 WBC (ref 0–0.2)
PLATELET # BLD AUTO: 271 10*3/MM3 (ref 140–450)
PMV BLD AUTO: 10.1 FL (ref 6–12)
POTASSIUM BLD-SCNC: 4.6 MMOL/L (ref 3.5–5.2)
RBC # BLD AUTO: 4.09 10*6/MM3 (ref 3.77–5.28)
SODIUM BLD-SCNC: 134 MMOL/L (ref 136–145)
WBC NRBC COR # BLD: 8.99 10*3/MM3 (ref 3.4–10.8)

## 2019-09-28 PROCEDURE — 85025 COMPLETE CBC W/AUTO DIFF WBC: CPT | Performed by: OBSTETRICS & GYNECOLOGY

## 2019-09-28 PROCEDURE — 25010000002 ENOXAPARIN PER 10 MG: Performed by: OBSTETRICS & GYNECOLOGY

## 2019-09-28 PROCEDURE — 80048 BASIC METABOLIC PNL TOTAL CA: CPT | Performed by: OBSTETRICS & GYNECOLOGY

## 2019-09-28 RX ORDER — DOCUSATE SODIUM 100 MG/1
200 CAPSULE, LIQUID FILLED ORAL 2 TIMES DAILY PRN
Status: DISCONTINUED | OUTPATIENT
Start: 2019-09-28 | End: 2019-09-30 | Stop reason: HOSPADM

## 2019-09-28 RX ADMIN — ATROPINE SULFATE 1 DROP: 10 SOLUTION OPHTHALMIC at 09:13

## 2019-09-28 RX ADMIN — OXYCODONE HYDROCHLORIDE 10 MG: 5 TABLET ORAL at 02:57

## 2019-09-28 RX ADMIN — ACETAMINOPHEN 650 MG: 325 TABLET ORAL at 17:34

## 2019-09-28 RX ADMIN — ACETAMINOPHEN 650 MG: 325 TABLET ORAL at 23:08

## 2019-09-28 RX ADMIN — OXYCODONE HYDROCHLORIDE 10 MG: 5 TABLET ORAL at 13:34

## 2019-09-28 RX ADMIN — IBUPROFEN 600 MG: 600 TABLET, FILM COATED ORAL at 14:52

## 2019-09-28 RX ADMIN — PREDNISOLONE ACETATE 1 DROP: 10 SUSPENSION/ DROPS OPHTHALMIC at 12:11

## 2019-09-28 RX ADMIN — ONDANSETRON HYDROCHLORIDE 4 MG: 4 TABLET, FILM COATED ORAL at 20:42

## 2019-09-28 RX ADMIN — ACETAMINOPHEN 650 MG: 325 TABLET ORAL at 12:11

## 2019-09-28 RX ADMIN — DOCUSATE SODIUM 200 MG: 100 CAPSULE, LIQUID FILLED ORAL at 20:42

## 2019-09-28 RX ADMIN — DONEPEZIL HYDROCHLORIDE 10 MG: 10 TABLET, FILM COATED ORAL at 20:41

## 2019-09-28 RX ADMIN — ATORVASTATIN CALCIUM 10 MG: 10 TABLET, FILM COATED ORAL at 20:42

## 2019-09-28 RX ADMIN — ACETAMINOPHEN 650 MG: 325 TABLET ORAL at 05:21

## 2019-09-28 RX ADMIN — PREDNISOLONE ACETATE 1 DROP: 10 SUSPENSION/ DROPS OPHTHALMIC at 17:35

## 2019-09-28 RX ADMIN — ATROPINE SULFATE 1 DROP: 10 SOLUTION OPHTHALMIC at 20:42

## 2019-09-28 RX ADMIN — PREDNISOLONE ACETATE 1 DROP: 10 SUSPENSION/ DROPS OPHTHALMIC at 20:42

## 2019-09-28 RX ADMIN — POLYETHYLENE GLYCOL 3350 17 G: 17 POWDER, FOR SOLUTION ORAL at 14:46

## 2019-09-28 RX ADMIN — ENOXAPARIN SODIUM 40 MG: 40 INJECTION SUBCUTANEOUS at 09:13

## 2019-09-28 RX ADMIN — TEMAZEPAM 7.5 MG: 7.5 CAPSULE ORAL at 22:58

## 2019-09-28 RX ADMIN — OXYBUTYNIN 10 MG: 10 TABLET, FILM COATED, EXTENDED RELEASE ORAL at 09:13

## 2019-09-28 RX ADMIN — PREDNISOLONE ACETATE 1 DROP: 10 SUSPENSION/ DROPS OPHTHALMIC at 07:53

## 2019-09-29 PROCEDURE — 25010000002 HYDROMORPHONE PER 4 MG: Performed by: OBSTETRICS & GYNECOLOGY

## 2019-09-29 PROCEDURE — 25010000002 ENOXAPARIN PER 10 MG: Performed by: OBSTETRICS & GYNECOLOGY

## 2019-09-29 RX ADMIN — ACETAMINOPHEN 650 MG: 325 TABLET ORAL at 05:56

## 2019-09-29 RX ADMIN — PREDNISOLONE ACETATE 1 DROP: 10 SUSPENSION/ DROPS OPHTHALMIC at 08:42

## 2019-09-29 RX ADMIN — ATROPINE SULFATE 1 DROP: 10 SOLUTION OPHTHALMIC at 08:42

## 2019-09-29 RX ADMIN — PREDNISOLONE ACETATE 1 DROP: 10 SUSPENSION/ DROPS OPHTHALMIC at 21:24

## 2019-09-29 RX ADMIN — TEMAZEPAM 7.5 MG: 7.5 CAPSULE ORAL at 23:51

## 2019-09-29 RX ADMIN — OXYCODONE HYDROCHLORIDE 5 MG: 5 TABLET ORAL at 08:49

## 2019-09-29 RX ADMIN — OXYBUTYNIN 10 MG: 10 TABLET, FILM COATED, EXTENDED RELEASE ORAL at 08:41

## 2019-09-29 RX ADMIN — DONEPEZIL HYDROCHLORIDE 10 MG: 10 TABLET, FILM COATED ORAL at 21:24

## 2019-09-29 RX ADMIN — POLYETHYLENE GLYCOL 3350 17 G: 17 POWDER, FOR SOLUTION ORAL at 08:41

## 2019-09-29 RX ADMIN — ATORVASTATIN CALCIUM 10 MG: 10 TABLET, FILM COATED ORAL at 21:24

## 2019-09-29 RX ADMIN — PREDNISOLONE ACETATE 1 DROP: 10 SUSPENSION/ DROPS OPHTHALMIC at 11:59

## 2019-09-29 RX ADMIN — DOCUSATE SODIUM 200 MG: 100 CAPSULE, LIQUID FILLED ORAL at 21:31

## 2019-09-29 RX ADMIN — PREDNISOLONE ACETATE 1 DROP: 10 SUSPENSION/ DROPS OPHTHALMIC at 17:16

## 2019-09-29 RX ADMIN — ACETAMINOPHEN 650 MG: 325 TABLET ORAL at 23:51

## 2019-09-29 RX ADMIN — ACETAMINOPHEN 650 MG: 325 TABLET ORAL at 17:16

## 2019-09-29 RX ADMIN — OXYCODONE HYDROCHLORIDE 10 MG: 5 TABLET ORAL at 18:39

## 2019-09-29 RX ADMIN — ACETAMINOPHEN 650 MG: 325 TABLET ORAL at 11:59

## 2019-09-29 RX ADMIN — ENOXAPARIN SODIUM 40 MG: 40 INJECTION SUBCUTANEOUS at 08:41

## 2019-09-29 RX ADMIN — ATROPINE SULFATE 1 DROP: 10 SOLUTION OPHTHALMIC at 21:24

## 2019-09-29 RX ADMIN — HYDROMORPHONE HYDROCHLORIDE 0.5 MG: 1 INJECTION, SOLUTION INTRAMUSCULAR; INTRAVENOUS; SUBCUTANEOUS at 10:10

## 2019-09-30 VITALS
OXYGEN SATURATION: 94 % | HEART RATE: 63 BPM | WEIGHT: 125.2 LBS | BODY MASS INDEX: 22.18 KG/M2 | RESPIRATION RATE: 18 BRPM | TEMPERATURE: 98.3 F | SYSTOLIC BLOOD PRESSURE: 146 MMHG | DIASTOLIC BLOOD PRESSURE: 70 MMHG | HEIGHT: 63 IN

## 2019-09-30 LAB
ABO + RH BLD: NORMAL
ABO + RH BLD: NORMAL
BH BB BLOOD EXPIRATION DATE: NORMAL
BH BB BLOOD EXPIRATION DATE: NORMAL
BH BB BLOOD TYPE BARCODE: 6200
BH BB BLOOD TYPE BARCODE: 6200
BH BB DISPENSE STATUS: NORMAL
BH BB DISPENSE STATUS: NORMAL
BH BB PRODUCT CODE: NORMAL
BH BB PRODUCT CODE: NORMAL
BH BB UNIT NUMBER: NORMAL
BH BB UNIT NUMBER: NORMAL
UNIT  ABO: NORMAL
UNIT  ABO: NORMAL
UNIT  RH: NORMAL
UNIT  RH: NORMAL

## 2019-09-30 PROCEDURE — 25010000002 ENOXAPARIN PER 10 MG: Performed by: OBSTETRICS & GYNECOLOGY

## 2019-09-30 PROCEDURE — 97162 PT EVAL MOD COMPLEX 30 MIN: CPT

## 2019-09-30 PROCEDURE — 97116 GAIT TRAINING THERAPY: CPT

## 2019-09-30 RX ORDER — ACETAMINOPHEN 325 MG/1
650 TABLET ORAL EVERY 6 HOURS SCHEDULED
Qty: 30 TABLET | Refills: 0 | Status: SHIPPED | OUTPATIENT
Start: 2019-09-30 | End: 2019-10-10

## 2019-09-30 RX ORDER — BISACODYL 10 MG
10 SUPPOSITORY, RECTAL RECTAL DAILY
Status: DISCONTINUED | OUTPATIENT
Start: 2019-09-30 | End: 2019-09-30 | Stop reason: HOSPADM

## 2019-09-30 RX ORDER — ONDANSETRON 4 MG/1
4 TABLET, FILM COATED ORAL EVERY 6 HOURS PRN
Qty: 10 TABLET | Refills: 0 | Status: SHIPPED | OUTPATIENT
Start: 2019-09-30 | End: 2020-06-03 | Stop reason: ALTCHOICE

## 2019-09-30 RX ORDER — DOCUSATE SODIUM 100 MG/1
200 CAPSULE, LIQUID FILLED ORAL 2 TIMES DAILY PRN
Qty: 60 CAPSULE | Refills: 2 | Status: SHIPPED | OUTPATIENT
Start: 2019-09-30 | End: 2020-09-08

## 2019-09-30 RX ORDER — IBUPROFEN 600 MG/1
600 TABLET ORAL EVERY 6 HOURS PRN
Qty: 60 TABLET | Refills: 0 | Status: SHIPPED | OUTPATIENT
Start: 2019-09-30 | End: 2019-10-28

## 2019-09-30 RX ORDER — OXYCODONE HYDROCHLORIDE 5 MG/1
5 TABLET ORAL EVERY 4 HOURS PRN
Qty: 20 TABLET | Refills: 0 | Status: SHIPPED | OUTPATIENT
Start: 2019-09-30 | End: 2019-10-10

## 2019-09-30 RX ADMIN — OXYBUTYNIN 10 MG: 10 TABLET, FILM COATED, EXTENDED RELEASE ORAL at 08:14

## 2019-09-30 RX ADMIN — ACETAMINOPHEN 650 MG: 325 TABLET ORAL at 07:23

## 2019-09-30 RX ADMIN — BISACODYL 10 MG: 10 SUPPOSITORY RECTAL at 10:12

## 2019-09-30 RX ADMIN — ACETAMINOPHEN 650 MG: 325 TABLET ORAL at 12:03

## 2019-09-30 RX ADMIN — PREDNISOLONE ACETATE 1 DROP: 10 SUSPENSION/ DROPS OPHTHALMIC at 08:15

## 2019-09-30 RX ADMIN — OXYCODONE HYDROCHLORIDE 5 MG: 5 TABLET ORAL at 11:24

## 2019-09-30 RX ADMIN — PREDNISOLONE ACETATE 1 DROP: 10 SUSPENSION/ DROPS OPHTHALMIC at 12:03

## 2019-09-30 RX ADMIN — ATROPINE SULFATE 1 DROP: 10 SOLUTION OPHTHALMIC at 08:15

## 2019-09-30 RX ADMIN — ENOXAPARIN SODIUM 40 MG: 40 INJECTION SUBCUTANEOUS at 08:14

## 2019-09-30 RX ADMIN — POLYETHYLENE GLYCOL 3350 17 G: 17 POWDER, FOR SOLUTION ORAL at 08:14

## 2019-10-01 ENCOUNTER — READMISSION MANAGEMENT (OUTPATIENT)
Dept: CALL CENTER | Facility: HOSPITAL | Age: 79
End: 2019-10-01

## 2019-10-01 NOTE — OUTREACH NOTE
Prep Survey      Responses   Facility patient discharged from?  [facility]   Is patient eligible?  No   What are the reasons patient is not eligible?  Other [GYN admission]   Does the patient have one of the following disease processes/diagnoses(primary or secondary)?  General Surgery   Prep survey completed?  Yes          Miya Alvarez RN

## 2019-10-02 ENCOUNTER — TELEPHONE (OUTPATIENT)
Dept: GYNECOLOGIC ONCOLOGY | Facility: CLINIC | Age: 79
End: 2019-10-02

## 2019-10-02 LAB
LAB AP CASE REPORT: NORMAL
PATH REPORT.FINAL DX SPEC: NORMAL

## 2019-10-02 NOTE — TELEPHONE ENCOUNTER
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  I discussed this with the patient.  Despite this very encouraging report, I am going to recommend chemotherapy due to the very aggressive nature of carcinosarcoma cancers.  Patient is to return for postoperative visit and final discussion regarding plans and counseling regarding risks and benefits along with review of guideline recommendations.  She reports she is overall healing well but having some issues with constipation.  Laxatives were encouraged.

## 2019-10-09 NOTE — PROGRESS NOTES
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 woman who presents for a post operative evaluation status post type 1 radical abdominal hysterectomy, bilateral salpingo-oophorectomy, pelvic lymph node dissection, omentectomy, and cystoscopy with temporary ureteral stent insertion performed on 9/27/19.      Surgery and hospital course were uncomplicated.  Today, patient notes normal bowel and bladder function.  Her pain is well controlled. She has questions about resuming normal activities.  Patient is accompanied by her .  She is intermittently tearful through today's visit and very dejected regarding her pathology.    Past Medical History, Past Surgical History, Social History, Family History have been reviewed and are without significant changes except as mentioned.    Review of Systems   All other systems were reviewed and are negative except as mentioned above.    Medications:  The current medication list was reviewed in the EMR    ALLERGIES:  No Known Allergies        /64   Pulse 69   Temp 97.6 °F (36.4 °C) (Temporal)   Resp 16   Wt 56.2 kg (124 lb)   SpO2 99%   BMI 21.97 kg/m²        Physical Exam  Constitutional:  Patient is a pleasant woman in no acute distress.  Gastrointestinal: Abdomen is soft and appropriately tender.  There is no mass palpated.  There is no rebound or guarding.  Incision(s) is clean, dry and intact.  Extremities:  Bilateral lower extremities are non-tender.  Gynecologic:GYNECOLOGIC:  External genitalia are free from lesion. On speculum examination, the vaginal cuff was intact and no lesions were appreciated.  On bimanual examination, no fullness was appreciated.  Uterus, cervix and adnexa were absent.  There was no significant tenderness.  Rectovaginal exam was deferred.      PATHOLOGY:  1. 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 Sarcomatous component is undifferentiated.  Background atrophic endometrium.  See synoptic report.    2. RIGHT PELVIC SIDE WALL, BIOPSY:  Peritoneum with acute inflammation and reactive epithelial changes.  No evidence of malignancy.   3. LYMPH NODES, RIGHT PELVIC, EXCISIONAL BIOPSY:  Three benign lymph nodes, negative for metastatic carcinoma (0/3).   4. LY MPH NODES, RIGHT COMMON, EXCISIONAL BIOPSY:  Two benign lymph nodes, negative for metastatic carcinoma (0/2).   5. LYMPH NODES, LEFT PELVIC, EXCISIONAL BIOPSY:  Two benign lymph nodes, negative for metastatic carcinoma (0/2).   6. OMENTUM, OMENTECTOMY:  Benign omentum.  No tumor is identified.    ASSESSMENT/PLAN:  Yin Siddiqui returns for a post-operative evaluation today.  All pathology reports were given to patient.    Regarding her cancer diagnosis, chemotherapy with carboplatin at AUC 6+ paclitaxel at 175 mg/m² IV q. 21 days x 6 cycles was recommended given the aggressive nature of carcinosarcoma cancers.  NCCN guidelines for treatment were reviewed and a copy was given to the patient of the guideline recommendations.  Also per guideline recommendations, vaginal brachytherapy was recommended.    We reviewed the inherent side effects of Carboplatin/Paclitaxel chemotherapy, including but not limited to: bone marrow suppression, peripheral neuropathy, fatigue, alopecia, constipation, and less commonly nausea/vomiting, allergic reaction, extravasation.     IV access with peripheral IV versus PICC line versus Port-A-Cath were discussed.  Patient is leaning towards peripheral IV with consideration of PICC line should the peripheral IV administration of chemotherapy not be conducive.    Patient was  tearful and quite upset regarding her cancer diagnosis and recommendations for adjuvant treatment.  I provided emotional support and offered medication in the form of antidepressants or anxiolytics.  Patient was reassured regarding the early stage of her cancer.  Despite this, she remained quite upset.  She expressed concern regarding hair loss and I mentioned scalp cooling as a possibility to decrease her alopecia toxicity.  Referral was made for counseling.        She is to follow up for chemotherapy teaching.    I personally spent 25 minutes face-to-face with the patient of which >50% was spent performing counseling/coordiantion of care.    Patient was seen and examined with Dr. Lu,  resident, who performed portions of the examination and documentation for this patient's care under my direct supervision.  I agree with the above documentation and plan.    Dasha Claudio MD  10/12/19  3:49 PM    ADDENDUM:    Referral also made for vaginal brachytherapy.

## 2019-10-10 ENCOUNTER — OFFICE VISIT (OUTPATIENT)
Dept: GYNECOLOGIC ONCOLOGY | Facility: CLINIC | Age: 79
End: 2019-10-10

## 2019-10-10 VITALS
WEIGHT: 124 LBS | BODY MASS INDEX: 21.97 KG/M2 | DIASTOLIC BLOOD PRESSURE: 64 MMHG | RESPIRATION RATE: 16 BRPM | SYSTOLIC BLOOD PRESSURE: 122 MMHG | HEART RATE: 69 BPM | TEMPERATURE: 97.6 F | OXYGEN SATURATION: 99 %

## 2019-10-10 DIAGNOSIS — F06.4 ANXIETY DISORDER DUE TO MEDICAL CONDITION: ICD-10-CM

## 2019-10-10 DIAGNOSIS — C54.1 ENDOMETRIAL CANCER (HCC): Primary | ICD-10-CM

## 2019-10-10 PROCEDURE — 99214 OFFICE O/P EST MOD 30 MIN: CPT | Performed by: OBSTETRICS & GYNECOLOGY

## 2019-10-12 PROBLEM — F06.4 ANXIETY DISORDER DUE TO MEDICAL CONDITION: Status: ACTIVE | Noted: 2019-10-12

## 2019-10-14 ENCOUNTER — OFFICE VISIT (OUTPATIENT)
Dept: PSYCHIATRY | Facility: CLINIC | Age: 79
End: 2019-10-14

## 2019-10-14 ENCOUNTER — TELEPHONE (OUTPATIENT)
Dept: OBSTETRICS AND GYNECOLOGY | Facility: CLINIC | Age: 79
End: 2019-10-14

## 2019-10-14 DIAGNOSIS — F41.9 ANXIETY DISORDER, UNSPECIFIED TYPE: Primary | ICD-10-CM

## 2019-10-14 PROCEDURE — 90792 PSYCH DIAG EVAL W/MED SRVCS: CPT | Performed by: NURSE PRACTITIONER

## 2019-10-14 NOTE — PROGRESS NOTES
Subjective   Yin Siddiqui is a 79 y.o. female who is here today for initial appointment. Patient was referred by: Dr. Claudio for anxiety regarding cancer diagnosis and treatment. Patient is s/p radical total abdominal hysterectomy, bilateral salpingo-oophorectomy, pelvic lymph node dissection, omentectomy for endometrial cancer by Dr. Mike.  Patient is retired and lives in [place] with her .  She has good support system she states and one son who lives in [place] and one son who lives in [place].  Patient has transportation for treatment and her  will be with her.    Chief Complaint: Anxiety sadness    History of Present Illness patient presents with her  for evaluation.  She reports she had eye surgery in early September and then the total hysterectomy on September 27.  Patient reports feeling very anxious and distressed when she found out that she would have chemotherapy and radiation therapy.  She states she is getting a little more used to the idea of treatment but it all happened so fast.The patient reports the following symptoms of  anxiety over the last 5 weeks: constant anxiety/worry, restlessness/on edge, difficulty concentrating, mind goes blank, muscle tension and sleep disturbance and have caused impairment in important areas of functioning.  Patient is on Aricept for mild cognitive decline per . Patient does look to her  before answering most questions but then does answer correctly on own. She does have some time issues but could be related to stress. She reports sleeping well. Recovering from extensive abd surgery. She is eating and trying to gain back weight she has lost. She reports adjusting to the idea of having to have chemotherapy and radiation. She reports fears of radiation and chemotherapy. She reports she was calm about the surgery and didn't expect further treatment. She has talked with her , children and has strong community with ex  co workers and [employer] members. She denies history of PTSD, OCD, sofy, depression, panic, or mood disorders. She denies illicit drug use or alcohol use or tobacco use ever.     The following portions of the patient's history were reviewed and updated as appropriate: allergies, current medications, past family history, past medical history, past social history, past surgical history and problem list.      Past Psych History: denies     Substance Abuse: denies       ABUSE HX: denies   LEGAL HX: denies       Family Psychiatric History:  family history includes Breast cancer (age of onset: 78) in her mother.      Social History:  over 41 years. She and her  worked at [employer] in [place], [place] their entire careers. They have grown sons and 4 grandchildren. One son lives in [place] and one in [place]. She loves to read and reports used to like hiking, walking. Patient believes in God and attends [employer].        Medical/Surgical History:  Past Medical History:   Diagnosis Date   • Anemia    • Degenerative disc disease, lumbar     hands and knees    • Dyslipidemia    • Endometrial cancer (CMS/HCC) 9/27/2019   • GERD (gastroesophageal reflux disease)    • Hyperlipidemia    • Menopause    • OAB (overactive bladder)    • Osteopenia    • PMB (postmenopausal bleeding)    • Urge incontinence    • Vaginal atrophy    • Wears dentures     upper   • Wears reading eyeglasses      Past Surgical History:   Procedure Laterality Date   • CATARACT EXTRACTION W/ INTRAOCULAR LENS  IMPLANT, BILATERAL     • COLONOSCOPY  2019   • CYST REMOVAL      left wrist   • CYSTOSCOPY W/ URETERAL STENT PLACEMENT Bilateral 9/27/2019    Procedure: CYSTOSCOPY WITH BILATERAL TEMPORARY URETERAL STENT INSERTION;  Surgeon: Dasha Claudio MD;  Location: [facility];  Service: Gynecology   • D&C HYSTEROSCOPY     • EXPLORATORY LAPAROTOMY, TOTAL ABDOMINAL HYSTERECTOMY, SALPINGO OOPHORECTOMY WITH TUMOR DEBULKING Bilateral 9/27/2019    Procedure:  EXPLORATORY LAPAROTOMY TYPE 1, RADICAL TOTAL ABDOMINAL HYSTERECTOMY, BILATERAL SALPINGO OOPHORECTOMY, PELVIC LYMPH NODE DISSECTION, OMENTUMECTOMY;  Surgeon: Dasha Claudio MD;  Location: [facility];  Service: Gynecology   • EYE SURGERY Left 09/05/2019    macular hole    • EYE SURGERY      as a child for muscular problems  x 3   • HAND SURGERY Right     bone removed    • TONSILLECTOMY         No Known Allergies    Current Medications:   Current Outpatient Medications   Medication Sig Dispense Refill   • atropine 1 % ophthalmic solution 1 drop 2 (Two) Times a Day.     • CALCIUM-VITAMIN D PO Take 1 tablet by mouth Daily.     • docusate sodium (COLACE) 100 MG capsule Take 2 capsules by mouth 2 (Two) Times a Day As Needed for Constipation. 60 capsule 2   • donepezil (ARICEPT) 10 MG tablet Take 10 mg by mouth Every Night.     • enoxaparin (LOVENOX) 40 MG/0.4ML solution syringe Inject 0.4 mL under the skin into the appropriate area as directed Daily for 28 days. 11.2 mL 0   • ferrous sulfate 325 (65 FE) MG tablet Take 1 tablet by mouth Daily With Breakfast. 30 tablet 3   • ibuprofen (ADVIL,MOTRIN) 600 MG tablet Take 1 tablet by mouth Every 6 (Six) Hours As Needed for Mild Pain . 60 tablet 0   • LORazepam (ATIVAN) 1 MG tablet Take 0.5 mg by mouth Every Night.     • Multiple Vitamins-Minerals (MULTIVITAMIN ADULT PO) Take 1 tablet by mouth Daily.     • Omega-3 Fatty Acids (FISH OIL) 1000 MG capsule capsule Take 1,000 mg by mouth Daily With Breakfast.     • ondansetron (ZOFRAN) 4 MG tablet Take 1 tablet by mouth Every 6 (Six) Hours As Needed for Nausea or Vomiting. 10 tablet 0   • polyethylene glycol (MIRALAX) powder Take 17 g by mouth Daily.     • prednisoLONE acetate (PRED FORTE) 1 % ophthalmic suspension 1 drop 4 (Four) Times a Day.     • simvastatin (ZOCOR) 10 MG tablet Take 10 mg by mouth every night.     • tolterodine (DETROL) 2 MG tablet Take 1 tablet by mouth Daily. 30 tablet 11   • Xylitol (XYLIMELTS) 550 MG disk  Apply  to the mouth or throat Every Night.       No current facility-administered medications for this visit.        Lab Results and pathology reviewed in EPIC        Review of Systems Constitutional: Negative for appetite change, chills, diaphoresis, fatigue, fever and unexpected weight change.   HENT: Negative for hearing loss, sore throat, trouble swallowing and voice change.    Eyes: Negative for photophobia and visual disturbance.   Respiratory: Negative for cough, chest tightness and shortness of breath.    Cardiovascular: Negative for chest pain and palpitations.   Gastrointestinal: Negative for abdominal pain, constipation, nausea and vomiting.   Endocrine: Negative for cold intolerance and heat intolerance.   Genitourinary: Negative for dysuria and frequency.   Musculoskeletal: Negative for arthralgia, back pain, joint swelling and neck stiffness.   Skin: Negative for color change and wound.   Allergic/Immunologic: Negative for environmental allergies and immunocompromised state.   Neurological: Negative for dizziness, tremors, seizures, syncope, weakness, light-headedness and headaches.   Hematological: Negative for adenopathy. Does not bruise/bleed easily.    Objective   Physical Exam  not currently breastfeeding.    Mental Status Exam:   Appearance: appropriate  Hygiene:   good  Cooperation:  Cooperative  Eye Contact:  Good  Psychomotor Behavior:  Appropriate  Mood:  anxious  Affect:  Appropriate  Hopelessness: Denies  Speech:  Normal  Thought Process:  Linear  Thought Content:  Normal  Suicidal:  None  Homicidal:  None  Hallucinations:  None  Delusion:  None  Memory:  Deficits  Orientation:  Person, Place, Time and Situation  Reliability:  fair  Insight:  Fair  Judgement:  Good  Impulse Control:  Good  Physical/Medical Issues:  Yes endometrial cancer and treatment      Short-term goals: Patient will be compliant with clinic appointments.  Patient will be engaged in therapy, medication compliant with  minimal side effects. Patient  will report decrease of symptoms and frequency.    Long-term goals: Patient will have minimal symptoms of mental health disorder with continued treatment. Patient will be compliant with treatment and appointments.       Problem list:   Strengths: patient appears motivated for treatment          Assessment/Plan   Diagnoses and all orders for this visit:    Anxiety disorder, unspecified type    A psychological evaluation was conducted in order to assess past and current level of functioning. Areas assessed included, but were not limited to: perception of social support, perception of ability to face and deal with challenges in life (positive functioning), anxiety symptoms, depressive symptoms, perspective on beliefs/belief system, coping skills for stress, intelligence level,  Therapeutic rapport was established. Interventions conducted today were geared towards incorporating medication management along with support for continued therapy. Education was also provided as to the med management with this provider and what to expect in subsequent sessions.    Assisted patient in processing above session content; acknowledged and normalized patient’s thoughts, feelings, and concerns.  Applied  positive coping skills and behavior management in session.  Allowed patient to freely discuss issues without interruption or judgment. Provided safe, confidential environment to facilitate the development of positive therapeutic relationship and encourage open, honest communication. Assisted patient in identifying risk factors which would indicate the need for higher level of care including thoughts to harm self or others and/or self-harming behavior and encouraged patient to contact this office, call 911, or present to the nearest emergency room should any of these events occur. Discussed crisis intervention services and means to access.  Patient adamantly and convincingly denies current suicidal or  homicidal ideation or perceptual disturbance.    Patient is pleased she knows about this service , she feels she is adjusting to the idea of treatment and is sleeping, eating. She would like to come see this provider on an as needed basis. Discussed service is for she and her family as needed.     Crisis plan reviewed including going to the Emergency department.       Treatment Plan: stabilize mood,  patient will stay out of the hospital and be at optimal level of functioning, take all medication as prescribed. Patient verbalized  understanding and agreement to plan.      Return if symptoms worsen or fail to improve.

## 2019-10-14 NOTE — TELEPHONE ENCOUNTER
I have called the patient today to in regards to her radical hysterectomy with lymph node dissection by Dr. Claudio with a diagnosis of carcinosarcoma of the uterus.  The patient had been somewhat depressed, which is normal.  Her  indicates that her spirits are much better now.  They have had a follow-up visit regarding the need for chemotherapy and brachytherapy.  Although she dreads this she is positive about having this follow-up therapy to ensure that there is no recurrence.  She understands the aggressive nature of this tumor.  I have reviewed the pathology report with her again as Dr. Claudio has done.  She and her  were both very pleased with Dr. Claudio's care.

## 2019-10-22 ENCOUNTER — HOSPITAL ENCOUNTER (OUTPATIENT)
Dept: RADIATION ONCOLOGY | Facility: HOSPITAL | Age: 79
Setting detail: RADIATION/ONCOLOGY SERIES
Discharge: HOME | End: 2019-10-22

## 2019-10-22 ENCOUNTER — OFFICE VISIT (OUTPATIENT)
Dept: GYNECOLOGIC ONCOLOGY | Facility: CLINIC | Age: 79
End: 2019-10-22

## 2019-10-22 ENCOUNTER — OFFICE VISIT (OUTPATIENT)
Dept: RADIATION ONCOLOGY | Facility: HOSPITAL | Age: 79
End: 2019-10-22

## 2019-10-22 ENCOUNTER — APPOINTMENT (OUTPATIENT)
Dept: LAB | Facility: HOSPITAL | Age: 79
End: 2019-10-22

## 2019-10-22 VITALS
DIASTOLIC BLOOD PRESSURE: 67 MMHG | SYSTOLIC BLOOD PRESSURE: 138 MMHG | RESPIRATION RATE: 18 BRPM | HEART RATE: 102 BPM | TEMPERATURE: 97.3 F | WEIGHT: 121.3 LBS | OXYGEN SATURATION: 97 % | BODY MASS INDEX: 21.49 KG/M2

## 2019-10-22 VITALS
RESPIRATION RATE: 18 BRPM | HEIGHT: 63 IN | TEMPERATURE: 97.9 F | HEART RATE: 76 BPM | OXYGEN SATURATION: 96 % | BODY MASS INDEX: 21.09 KG/M2 | WEIGHT: 119 LBS | DIASTOLIC BLOOD PRESSURE: 66 MMHG | SYSTOLIC BLOOD PRESSURE: 144 MMHG

## 2019-10-22 DIAGNOSIS — L65.8 ALOPECIA DUE TO CYTOTOXIC DRUG: ICD-10-CM

## 2019-10-22 DIAGNOSIS — C54.1 ENDOMETRIAL CANCER (HCC): ICD-10-CM

## 2019-10-22 DIAGNOSIS — Z79.899 ON ANTINEOPLASTIC CHEMOTHERAPY: ICD-10-CM

## 2019-10-22 DIAGNOSIS — T45.1X5A ALOPECIA DUE TO CYTOTOXIC DRUG: ICD-10-CM

## 2019-10-22 DIAGNOSIS — C54.1 ENDOMETRIAL CANCER (HCC): Primary | ICD-10-CM

## 2019-10-22 PROCEDURE — G0463 HOSPITAL OUTPT CLINIC VISIT: HCPCS | Performed by: RADIOLOGY

## 2019-10-22 PROCEDURE — 99215 OFFICE O/P EST HI 40 MIN: CPT | Performed by: NURSE PRACTITIONER

## 2019-10-22 RX ORDER — ONDANSETRON HYDROCHLORIDE 8 MG/1
8 TABLET, FILM COATED ORAL 3 TIMES DAILY PRN
Qty: 30 TABLET | Refills: 5 | Status: SHIPPED | OUTPATIENT
Start: 2019-10-22 | End: 2020-06-03 | Stop reason: ALTCHOICE

## 2019-10-22 RX ORDER — DEXAMETHASONE 4 MG/1
TABLET ORAL
Qty: 11 TABLET | Refills: 5 | Status: SHIPPED | OUTPATIENT
Start: 2019-10-22 | End: 2020-02-26 | Stop reason: SDUPTHER

## 2019-10-22 RX ORDER — LORATADINE 10 MG/1
TABLET ORAL
Qty: 12 TABLET | Refills: 1 | Status: SHIPPED | OUTPATIENT
Start: 2019-10-22 | End: 2020-09-08

## 2019-10-22 RX ORDER — PROMETHAZINE HYDROCHLORIDE 25 MG/1
25 TABLET ORAL EVERY 6 HOURS PRN
Qty: 30 TABLET | Refills: 5 | Status: SHIPPED | OUTPATIENT
Start: 2019-10-22 | End: 2020-06-03 | Stop reason: ALTCHOICE

## 2019-10-22 NOTE — PROGRESS NOTES
CONSULTATION NOTE      :                                                          1940  DATE OF CONSULTATION:                       10/22/2019   REQUESTING PHYSICIAN:                   Dasha Claudio MD  REASON FOR CONSULTATION:            Cancer Staging  Endometrial cancer (CMS/HCC)  Staging form: Corpus Uteri - Carcinoma And Carcinosarcoma, AJCC 8th Edition  - Clinical stage from 2019: FIGO Stage I (cT1, cN0, cM0) - Signed by Dasha Claudio MD on 10/10/2019    Thank you for requesting my services in evaluation of this pleasant individual.  I am seeing them in outpatient consultation regarding a diagnosis of uterine carcinosarcoma and for consideration of brachytherapy.     BRIEF HISTORY:  The patient is a very pleasant 79 y.o. post-menopausal female  with past medical history significant for degenerative disc disease, hyperlipidemia, who presented in September with symptoms of abnormal uterine bleeding.  She only had one episode of postmenopausal bleeding previously in May 2018 when she underwent a D&C which demonstrated fragments of an endometrial polyp, but no evidence of hyperplasia or atypia.  Because of that single episode, she was concerned when she developed new onset vaginal bleeding in September and she presented to the emergency department.  She subsequently underwent an endometrial biopsy demonstrating epithelium with marked cytologic atypia with extensive tumor necrosis consistent with a carcinoma.  She was subsequently scheduled to be evaluated by Dr. Dasha Claudio who then performed a hysterectomy and bilateral salpingo-oophorectomy.  Final pathology demonstrated a multifocal high-grade carcinosarcoma with serous components.  There was no evidence of lymph node involvement and there is also no evidence of involvement of the adnexa.  She has been staged as an FIGO 1A.  Since undergoing surgery, she reports that she has had rather significant fatigue as well as constipation.  She  denies any urinary complaints and she also denies any vaginal bleeding or discharge.  She is scheduled to initiate systemic chemotherapy on November 6, 2019 with carboplatin and paclitaxel.  I am being asked to see her today for consideration of adjuvant vaginal brachytherapy.    No Known Allergies    Social History     Socioeconomic History   • Marital status:      Spouse name: Not on file   • Number of children: Not on file   • Years of education: Not on file   • Highest education level: Not on file   Tobacco Use   • Smoking status: Never Smoker   • Smokeless tobacco: Never Used   Substance and Sexual Activity   • Alcohol use: No   • Drug use: No   • Sexual activity: Defer     Partners: Male     Birth control/protection: Post-menopausal       Past Medical History:   Diagnosis Date   • Anemia    • Degenerative disc disease, lumbar     hands and knees    • Dyslipidemia    • Endometrial cancer (CMS/HCC) 9/27/2019   • GERD (gastroesophageal reflux disease)    • Hyperlipidemia    • Menopause    • OAB (overactive bladder)    • Osteopenia    • PMB (postmenopausal bleeding)    • Skin cancer    • Urge incontinence    • Vaginal atrophy    • Wears dentures     upper   • Wears reading eyeglasses        family history includes Breast cancer (age of onset: 78) in her mother.     Past Surgical History:   Procedure Laterality Date   • CATARACT EXTRACTION W/ INTRAOCULAR LENS  IMPLANT, BILATERAL     • COLONOSCOPY  2019   • CYST REMOVAL      left wrist   • CYSTOSCOPY W/ URETERAL STENT PLACEMENT Bilateral 9/27/2019    Procedure: CYSTOSCOPY WITH BILATERAL TEMPORARY URETERAL STENT INSERTION;  Surgeon: Dasha Claudio MD;  Location: [facility];  Service: Gynecology   • D&C HYSTEROSCOPY     • EXPLORATORY LAPAROTOMY, TOTAL ABDOMINAL HYSTERECTOMY, SALPINGO OOPHORECTOMY WITH TUMOR DEBULKING Bilateral 9/27/2019    Procedure: EXPLORATORY LAPAROTOMY TYPE 1, RADICAL TOTAL ABDOMINAL HYSTERECTOMY, BILATERAL SALPINGO OOPHORECTOMY, PELVIC  LYMPH NODE DISSECTION, OMENTUMECTOMY;  Surgeon: Dasha Claudio MD;  Location: [facility];  Service: Gynecology   • EYE SURGERY Left 09/05/2019    macular hole    • EYE SURGERY      as a child for muscular problems  x 3   • HAND SURGERY Right     bone removed    • TONSILLECTOMY          Review of Systems   Constitutional: Positive for fatigue.   All other systems reviewed and are negative.          Objective   VITAL SIGNS:   Vitals:    10/22/19 1437   BP: 138/67   Pulse: 102   Resp: 18   Temp: 97.3 °F (36.3 °C)   TempSrc: Temporal   SpO2: 97%   Weight: 55 kg (121 lb 4.8 oz)   PainSc: 0-No pain        Karnofsky score: 80    Physical Exam   Constitutional: She is oriented to person, place, and time. She appears well-developed and well-nourished. No distress.   HENT:   Head: Normocephalic and atraumatic.   Mouth/Throat: Oropharynx is clear and moist.   Eyes: Conjunctivae and EOM are normal. Pupils are equal, round, and reactive to light.   Neck: Normal range of motion. Neck supple.   Cardiovascular: Normal rate and regular rhythm. Exam reveals no friction rub.   No murmur heard.  Pulmonary/Chest: Effort normal and breath sounds normal. She has no wheezes.   Abdominal: Soft. Bowel sounds are normal. She exhibits no distension and no mass. There is no tenderness.   Well-healed midline abdominal incision without any evidence of cellulitis, fluctuance, or erythema.   Genitourinary:   Genitourinary Comments: Well-healed vaginal cuff without any abnormal nodularity or masses.   Musculoskeletal: Normal range of motion. She exhibits no edema.   Lymphadenopathy:     She has no cervical adenopathy.   Neurological: She is alert and oriented to person, place, and time.   Skin: Skin is warm and dry.   Psychiatric: She has a normal mood and affect. Her behavior is normal. Judgment and thought content normal.   Nursing note and vitals reviewed.        IMAGING  I have personally reviewed the relevant imaging studies, as follows:  Xr  Chest 2 View    Result Date: 9/26/2019  No active disease.  DICTATED:   09/26/2019 EDITED/ls :   09/26/2019  This report was finalized on 9/26/2019 4:25 PM by Dr. Manoj Muhammad MD.      Us Non-ob Transvaginal    Result Date: 9/17/2019  Enlarged abnormal-appearing uterus with submucosal or endometrial focus measuring 7 x 5.5 x 6.8 cm of mixed solid and cystic heterogeneity with some internal flow on Doppler interrogation concerning for complex heterogeneous thickening of the endometrium or a submucosal partially degenerative leiomyoma/fibroid. Follow-up with OB/GYN consultation recommended. Ovaries without evidence for torsion or free fluid in the cul-de-sac.   DICTATED:   09/15/2019 EDITED/ls :   09/15/2019  This report was finalized on 9/17/2019 4:35 PM by Dr. Iron Roe.      Ct Abdomen Pelvis With Contrast    Result Date: 9/21/2019  1. Approximately 7 cm central uterine mass, strongly vascular along its posterior lateral right margin at approximately 7:00 as noted above. No evidence of invasion into the surrounding soft tissues. 2. No evidence of adenopathy or other metastatic disease. 3. 2 cm nonenhancing water density right ovarian cyst. 4. Incidentally noted small hepatic cyst and normal variant bilateral renal parapelvic cysts.  D:  09/18/2019 E:  09/18/2019  This report was finalized on 9/21/2019 7:55 AM by DR. Niles Kate MD.        PATHOLOGY  Endometrial biopsy 9/18/2019:  Minute fragment of epithelium with marked cytologic atypia in a background of extensive tumor necrosis, highly concerning for carcinoma    Hysterectomy and Staging 9/27/2019:    1. UTERUS, CERVIX, BILATERAL FALLOPIAN TUBES AND OVARIES, HYSTERECTOMY AND BILATERAL SALPINGECTOMY:  Carcinosarcoma, multiple foci measuring up to 7.8 cm, with minimal myometrial invasion (up to 1/11 mm, 9% invasion).  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 Sarcomatous component is undifferentiated.  Background atrophic endometrium.  See synoptic  report.    2. RIGHT PELVIC SIDE WALL, BIOPSY:  Peritoneum with acute inflammation and reactive epithelial changes.  No evidence of malignancy.   3. LYMPH NODES, RIGHT PELVIC, EXCISIONAL BIOPSY:  Three benign lymph nodes, negative for metastatic carcinoma (0/3).   4. LY MPH NODES, RIGHT COMMON, EXCISIONAL BIOPSY:  Two benign lymph nodes, negative for metastatic carcinoma (0/2).   5. LYMPH NODES, LEFT PELVIC, EXCISIONAL BIOPSY:  Two benign lymph nodes, negative for metastatic carcinoma (0/2).   6. OMENTUM, OMENTECTOMY:  Benign omentum.  No tumor is identified.         The following portions of the patient's history were reviewed and updated as appropriate: allergies, current medications, past family history, past medical history, past social history, past surgical history and problem list.    Assessment  Mrs. 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  She has significant high risk features including high-grade histology, multifocal nature, and large primary tumor.  The depth of invasion was limited to only 9%.  As mentioned, she is already scheduled to receive systemic chemotherapy, and she will benefit from a course of vaginal brachytherapy in an effort to limit her risk of developing a vaginal recurrence.  I spent approximately 45 minutes today with the patient and her spouse discussing the rationale, risks, and benefits of vaginal brachytherapy for the management of uterine cancer.  After a full explanation of the risks and benefits, she was agreeable and signed informed consent.  I will schedule her insertions to begin the week following her chemotherapy.  She will require a total of 5 Brachytherapy insertions, which will each deliver a dose of 6 Gy to the vaginal surface.  We provided additional teaching today related to her radiation treatments.  I will see her back during the second week of November in order to perform a  Brachytherapy planning session.    RECOMMENDATIONS:      Return in about 3 weeks (around 11/12/2019) for Brachytherapy Simulation.  Yin was seen today for uterine cancer.    Diagnoses and all orders for this visit:    Endometrial cancer (CMS/[facility])    Thank you for allowing me to participate in the care of this individual.    Sincerely,       Valdez Yun MD

## 2019-10-24 ENCOUNTER — TELEPHONE (OUTPATIENT)
Dept: GYNECOLOGIC ONCOLOGY | Facility: CLINIC | Age: 79
End: 2019-10-24

## 2019-10-24 ENCOUNTER — APPOINTMENT (OUTPATIENT)
Dept: ONCOLOGY | Facility: HOSPITAL | Age: 79
End: 2019-10-24

## 2019-10-24 NOTE — TELEPHONE ENCOUNTER
Pt's  left me a vm message today requesting info on the cooling cap.  I returned the call.  No answer.  I left message to call me back with my office hours.

## 2019-10-25 NOTE — PROGRESS NOTES
CHEMOTHERAPY PREPARATION    Yin Siddiqui  [id number]  1940    Chief Complaint: Treatment preparation and needs assessment    History of present illness:  Yin Siddiqui is a 79 y.o. year old female who is here today for chemotherapy preparation and needs assessment. The patient has been diagnosed with endometrial carcinosarcoma and is scheduled to begin adjuvant treatment with carboplatin at AUC 6 plus paclitaxel 175 mg/m2 IV q21 days. She has also been referred to radiation oncology for consideration of vaginal brachytherapy, sees Dr. Yun later this afternoon. Cancer history thus far is outlined below. She is accompanied by her .     Oncology History:       Endometrial cancer (CMS/HCC)    9/18/2019 Initial Diagnosis     Endometrial biopsy by Dr. Lam due to recurrent postmenopausal bleeding. Pathology showed cytologic atypia in background of extensive tumor necrosis.  =36.6. Referred to Gyn Oncology         9/18/2019 Imaging     CT abd/pelvis IMPRESSION:  1. Approximately 7 cm central uterine mass, strongly vascular along its  posterior lateral right margin at approximately 7:00 as noted above. No  evidence of invasion into the surrounding soft tissues.  2. No evidence of adenopathy or other metastatic disease.  3. 2 cm nonenhancing water density right ovarian cyst.  4. Incidentally noted small hepatic cyst and normal variant bilateral  renal parapelvic cysts.  Pre-op CXR ARIS         9/27/2019 Surgery     Exploratory laparotomy, Type 1 radical hysterectomy, BSO, pelvic lymph node dissection, omentectomy, cystoscopy with bilateral temporary ureteral stents.     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 Sarcomatous component is undifferentiated. Cytology negative, no LVSI, nodes negative. Stage IA             Chemotherapy     OP UTERINE PACLitaxel / CARBOplatin (Q21D)              Past Medical History:   Diagnosis Date   • Anemia    • Degenerative disc disease, lumbar     hands and knees    • Dyslipidemia    • Endometrial cancer (CMS/HCC) 9/27/2019   • GERD (gastroesophageal reflux disease)    • Hyperlipidemia    • Menopause    • OAB (overactive bladder)    • Osteopenia    • PMB (postmenopausal bleeding)    • Skin cancer    • Urge incontinence    • Vaginal atrophy    • Wears dentures     upper   • Wears reading eyeglasses        Past Surgical History:   Procedure Laterality Date   • CATARACT EXTRACTION W/ INTRAOCULAR LENS  IMPLANT, BILATERAL     • COLONOSCOPY  2019   • CYST REMOVAL      left wrist   • CYSTOSCOPY W/ URETERAL STENT PLACEMENT Bilateral 9/27/2019    Procedure: CYSTOSCOPY WITH BILATERAL TEMPORARY URETERAL STENT INSERTION;  Surgeon: Dasha Claudio MD;  Location:  [facility];  Service: Gynecology   • D&C HYSTEROSCOPY     • EXPLORATORY LAPAROTOMY, TOTAL ABDOMINAL HYSTERECTOMY, SALPINGO OOPHORECTOMY WITH TUMOR DEBULKING Bilateral 9/27/2019    Procedure: EXPLORATORY LAPAROTOMY TYPE 1, RADICAL TOTAL ABDOMINAL HYSTERECTOMY, BILATERAL SALPINGO OOPHORECTOMY, PELVIC LYMPH NODE DISSECTION, OMENTUMECTOMY;  Surgeon: Dasha Claudio MD;  Location:  [facility];  Service: Gynecology   • EYE SURGERY Left 09/05/2019    macular hole    • EYE SURGERY      as a child for muscular problems  x 3   • HAND SURGERY Right     bone removed    • TONSILLECTOMY         MEDICATIONS: The current medication list was reviewed and reconciled.     Allergies:  has No Known Allergies.    Family History   Problem Relation Age of Onset   • Breast cancer Mother 78   • Ovarian cancer Neg Hx          Review of Systems   Constitutional: Positive for fatigue. Negative for fever and unexpected weight change.   HENT: Negative for congestion, hearing loss, sore throat and trouble swallowing.    Eyes: Negative for visual disturbance.   Respiratory:  "Negative for cough, shortness of breath and wheezing.    Cardiovascular: Negative for chest pain and leg swelling.   Gastrointestinal: Negative for abdominal distention, abdominal pain, constipation, diarrhea, nausea and vomiting.   Endocrine: Negative.    Genitourinary: Negative.    Musculoskeletal: Positive for arthralgias. Negative for back pain and gait problem.   Skin: Negative.    Allergic/Immunologic: Negative.    Neurological: Negative for dizziness, weakness, numbness and headaches.   Hematological: Negative for adenopathy. Does not bruise/bleed easily.   Psychiatric/Behavioral: Positive for decreased concentration. The patient is nervous/anxious.    All other systems reviewed and are negative.      Physical Exam  Vital Signs: /66   Pulse 76   Temp 97.9 °F (36.6 °C) (Temporal)   Resp 18   Ht 160 cm (63\")   Wt 54 kg (119 lb)   SpO2 96% Comment: RA  BMI 21.08 kg/m²    General Appearance:  alert, cooperative, no apparent distress, appears stated age and normal weight   Neurologic/Psychiatric: A&O x 3, gait steady, appropriate affect   HEENT:  Normocephalic, without obvious abnormality, mucous membranes moist   Lungs:   Clear to auscultation bilaterally; respirations regular, even, and unlabored bilaterally   Heart:  Regular rate and rhythm, no murmurs appreciated   Extremities: Normal, atraumatic; no clubbing, cyanosis, or edema    Skin: No rashes, lesions, or abnormal coloration noted     ECOG Performance Status: (0) Fully active, able to carry on all predisease performance without restriction          NEEDS ASSESSMENTS    Genetics  The patient's new diagnosis and family history have been reviewed for genetic counseling needs. A genetic referral is not recommended.     Psychosocial  The patient has completed a PHQ-9 Depression Screening and the Distress Thermometer (DT) today.   PHQ-9 results show 1-4 (Minimal Depression). The patient scored their distress today as 2 on a scale of 0-10 with 0 " "being no distress and 10 being extreme distress.   Problems marked by the patient as being an issue for them within the last week include fatigue, memory, and anxiety.   Results were reviewed along with psychosocial resources offered by our cancer center. Our oncology social worker will be flagged for a DT score of 4 or above, and a same day call will be made for a score of 9 or 10. A mental health referral has previously been made. Patient has had an intial visit with HERIBERTO Cervantes on 10/14/2019.  Copies of patient's questionnaires will be scanned into EMR for details and further reference.    Barriers to care  A barriers form was also completed by the patient today. We discussed services offered by our facility to help her have adequate access to care. The patient was given the name and card for our Oncology Social Worker, Lynda Hurtado. Based upon barriers assessment today, the patient will not require a follow-up call from the  to further discuss needs.   A copy of the barriers form will also be scanned into EMR for details and further reference.     VAD Assessment  The patient and I discussed planned intervenous chemotherapy as well as other IV treatments that are often needed throughout the course of treatment. These may include, but are not limited to blood transfusions, antibiotics, and IV hydration. The vasculature does appear to be adequate for multiple peripheral IVs throughout their treatment course. Discussed risks and benefits of VADs. The patient would not like to pursue Port-A-Cath insertion prior to initiation of treatment. She desires to begin with peripheral IV, then PICC if needed.     Advance Care Planning   The patient and I discussed advanced care planning, \"Conversations that Matter\".   This service was offered, free of charge, for development of advance directives with a certified ACP facilitator.  The patient does have an up-to-date advanced directive. This document is on " file with our office. The patient is not interested in an appointment with one of our facilitators to create or update their advanced directives.         Palliative Care  The patient and I discussed palliative care services. Palliative care is not the same as Hospice care. This is specialized medical care for people living with serious illness with the goal of improving quality of life for the patient and their family. [facility] has partnered with [facility] to offer our patients outpatient palliative care early along with their treatment to assist in coordination of care, symptom management, pain management, and medical decision making.  Oncology criteria for palliative care referral is not met at this time. The patient is not interested in a palliative care consultation.     Additional Referral needs  none      CHEMOTHERAPY EDUCATION    Booklets Given: Chemotherapy and You [x]  Eating Hints [x]    Sexuality/Fertility Books []      Chemotherapy/Biotherapy Education Sheets: (list all that apply)  nausea management, acid reflux management, Cancer resourse contacts information, skin and mouth care, vaccination information and wig information                                                                                                                                                                 Chemotherapy Regimen:   Treatment Plans     Name Type Plan dates Plan Provider         Active    OP UTERINE PACLitaxel / CARBOplatin (Q21D) ONCOLOGY TREATMENT  10/16/2019 - Present Dasha Claudio MD                    TOPICS EDUCATION PROVIDED COMMENTS   ANEMIA:  role of RBC, cause, s/s, ways to manage, role of transfusion [x]    THROMBOCYTOPENIA:  role of platelet, cause, s/s, ways to prevent bleeding, things to avoid, when to seek help [x]    NEUTROPENIA:  role of WBC, cause, infection precautions, s/s of infection, when to call MD [x] Reviewed [facility]ana labs at day 10 each cycle, [facility] is best location for  patient.  She has completed her seasonal flu shot   NUTRITION & APPETITE CHANGES:  importance of maintaining healthy diet & weight, ways to manage to improve intake, dietary consult, exercise regimen [x]    DIARRHEA:  causes, s/s of dehydration, ways to manage, dietary changes, when to call MD []    CONSTIPATION:  causes, ways to manage, dietary changes, when to call MD [x]    NAUSEA & VOMITING:  cause, use of antiemetics, dietary changes, when to call MD [x] Reviewed home anti-emetics, released to pharmacy   MOUTH SORES:  causes, oral care, ways to manage []    ALOPECIA:  cause, ways to manage, resources [x] Cranial prosthesis order and community resources given   INFERTILITY & SEXUALITY:  causes, fertility preservation options, sexuality changes, ways to manage, importance of birth control []    NERVOUS SYSTEM CHANGES:  causes, s/s, neuropathies, cognitive changes, ways to manage [x] Potential for peripheral neuropathy with this regimen   PAIN:  causes, ways to manage [x] ????   SKIN & NAIL CHANGES:  cause, s/s, ways to manage []    ORGAN TOXICITIES:  cause, s/s, need for diagnostic tests, labs, when to notify MD [x] Potential for renal toxicity, discussed adequate hydration and what to do if unable to get adequate fluid intake   SURVIVORSHIP:  distress, distress assessment, secondary malignancies, early/late effects, follow-up, social issues, social support [x]    HOME CARE:  use of spill kits, storing of PO chemo, how to manage bodily fluids [x]    MISCELLANEOUS:  drug interactions, administration, vesicant, et [x] Pre-treatment medications reviewed by RN and released to pharmacy       Assessment and Plan:    Diagnoses and all orders for this visit:    Endometrial cancer (CMS/[facility])  -     promethazine (PHENERGAN) 25 MG tablet; Take 1 tablet by mouth Every 6 (Six) Hours As Needed for Nausea or Vomiting.  -     loratadine (CLARITIN) 10 MG tablet; Take 1 tab the night before chemo then take 1 tab the morning of  chemo.  -     ondansetron (ZOFRAN) 8 MG tablet; Take 1 tablet by mouth 3 (Three) Times a Day As Needed for Nausea or Vomiting.  -     dexamethasone (DECADRON) 4 MG tablet; Take 5 tabs the night before chemo then take 2 tabs in the morning on days 2, 3 & 4. Take with food.  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     The patient and I have reviewed their new cancer diagnosis and scheduled treatment plan. Needs assessment was completed including genetics, psychosocial needs, barriers to care, VAD evaluation, advanced care planning, and palliative care services. Referrals have been ordered as appropriate based upon our evaluation and patient desires.     Chemotherapy teaching was also completed today as documented above. Adequate time was given to answer all questions to her satisfaction. Patient and family are aware of their care team members and contact information if they have questions or problems throughout the treatment course. Needs assessments and education has been completed. The patient is adequately prepared to begin treatment as scheduled.       Petra Avalos, HERIBERTO

## 2019-10-30 ENCOUNTER — TELEPHONE (OUTPATIENT)
Dept: RADIATION ONCOLOGY | Facility: HOSPITAL | Age: 79
End: 2019-10-30

## 2019-11-05 RX ORDER — DIPHENHYDRAMINE HYDROCHLORIDE 50 MG/ML
50 INJECTION INTRAMUSCULAR; INTRAVENOUS AS NEEDED
Status: CANCELLED | OUTPATIENT
Start: 2019-11-05

## 2019-11-05 RX ORDER — FAMOTIDINE 10 MG/ML
20 INJECTION, SOLUTION INTRAVENOUS AS NEEDED
Status: CANCELLED | OUTPATIENT
Start: 2019-11-05

## 2019-11-06 ENCOUNTER — HOSPITAL ENCOUNTER (OUTPATIENT)
Dept: ONCOLOGY | Facility: HOSPITAL | Age: 79
Setting detail: INFUSION SERIES
Discharge: HOME | End: 2019-11-06

## 2019-11-06 ENCOUNTER — DOCUMENTATION (OUTPATIENT)
Dept: NUTRITION | Facility: HOSPITAL | Age: 79
End: 2019-11-06

## 2019-11-06 ENCOUNTER — DOCUMENTATION (OUTPATIENT)
Dept: ONCOLOGY | Facility: CLINIC | Age: 79
End: 2019-11-06

## 2019-11-06 VITALS
DIASTOLIC BLOOD PRESSURE: 63 MMHG | SYSTOLIC BLOOD PRESSURE: 143 MMHG | HEIGHT: 63 IN | RESPIRATION RATE: 16 BRPM | HEART RATE: 71 BPM | WEIGHT: 119 LBS | BODY MASS INDEX: 21.09 KG/M2 | TEMPERATURE: 98.2 F

## 2019-11-06 DIAGNOSIS — C54.1 ENDOMETRIAL CANCER (HCC): Primary | ICD-10-CM

## 2019-11-06 LAB
ALBUMIN SERPL-MCNC: 3.2 G/DL (ref 3.5–5.2)
ALBUMIN/GLOB SERPL: 1.4 G/DL
ALP SERPL-CCNC: 77 U/L (ref 39–117)
ALT SERPL W P-5'-P-CCNC: 9 U/L (ref 1–33)
ANION GAP SERPL CALCULATED.3IONS-SCNC: 10 MMOL/L (ref 5–15)
AST SERPL-CCNC: 14 U/L (ref 1–32)
BILIRUB SERPL-MCNC: 0.2 MG/DL (ref 0.2–1.2)
BUN BLD-MCNC: 18 MG/DL (ref 8–23)
BUN/CREAT SERPL: 30 (ref 7–25)
CALCIUM SPEC-SCNC: 7.7 MG/DL (ref 8.6–10.5)
CHLORIDE SERPL-SCNC: 112 MMOL/L (ref 98–107)
CO2 SERPL-SCNC: 21 MMOL/L (ref 22–29)
CREAT BLD-MCNC: 0.6 MG/DL (ref 0.57–1)
CREAT BLDA-MCNC: 0.5 MG/DL (ref 0.6–1.3)
ERYTHROCYTE [DISTWIDTH] IN BLOOD BY AUTOMATED COUNT: 15.5 % (ref 12.3–15.4)
GFR SERPL CREATININE-BSD FRML MDRD: 97 ML/MIN/1.73
GLOBULIN UR ELPH-MCNC: 2.3 GM/DL
GLUCOSE BLD-MCNC: 104 MG/DL (ref 65–99)
HCT VFR BLD AUTO: 38.9 % (ref 34–46.6)
HGB BLD-MCNC: 12.8 G/DL (ref 12–15.9)
LYMPHOCYTES # BLD AUTO: 1.3 10*3/MM3 (ref 0.7–3.1)
LYMPHOCYTES NFR BLD AUTO: 18 % (ref 19.6–45.3)
MCH RBC QN AUTO: 29.8 PG (ref 26.6–33)
MCHC RBC AUTO-ENTMCNC: 32.9 G/DL (ref 31.5–35.7)
MCV RBC AUTO: 90.5 FL (ref 79–97)
MONOCYTES # BLD AUTO: 0.2 10*3/MM3 (ref 0.1–0.9)
MONOCYTES NFR BLD AUTO: 2.9 % (ref 5–12)
NEUTROPHILS # BLD AUTO: 5.7 10*3/MM3 (ref 1.7–7)
NEUTROPHILS NFR BLD AUTO: 79.1 % (ref 42.7–76)
PLATELET # BLD AUTO: 287 10*3/MM3 (ref 140–450)
PMV BLD AUTO: 7.5 FL (ref 6–12)
POTASSIUM BLD-SCNC: 3.4 MMOL/L (ref 3.5–5.2)
PROT SERPL-MCNC: 5.5 G/DL (ref 6–8.5)
RBC # BLD AUTO: 4.3 10*6/MM3 (ref 3.77–5.28)
SODIUM BLD-SCNC: 143 MMOL/L (ref 136–145)
WBC NRBC COR # BLD: 7.2 10*3/MM3 (ref 3.4–10.8)

## 2019-11-06 PROCEDURE — 25010000002 PALONOSETRON 0.25 MG/5ML SOLUTION PREFILLED SYRINGE: Performed by: NURSE PRACTITIONER

## 2019-11-06 PROCEDURE — 25010000002 DEXAMETHASONE PER 1 MG: Performed by: NURSE PRACTITIONER

## 2019-11-06 PROCEDURE — 25010000002 FOSAPREPITANT PER 1 MG: Performed by: NURSE PRACTITIONER

## 2019-11-06 PROCEDURE — 82565 ASSAY OF CREATININE: CPT

## 2019-11-06 PROCEDURE — 96365 THER/PROPH/DIAG IV INF INIT: CPT

## 2019-11-06 PROCEDURE — 96415 CHEMO IV INFUSION ADDL HR: CPT

## 2019-11-06 PROCEDURE — 80053 COMPREHEN METABOLIC PANEL: CPT | Performed by: NURSE PRACTITIONER

## 2019-11-06 PROCEDURE — 25010000002 CARBOPLATIN PER 50 MG: Performed by: OBSTETRICS & GYNECOLOGY

## 2019-11-06 PROCEDURE — 96413 CHEMO IV INFUSION 1 HR: CPT

## 2019-11-06 PROCEDURE — 25010000002 DIPHENHYDRAMINE PER 50 MG: Performed by: NURSE PRACTITIONER

## 2019-11-06 PROCEDURE — 96375 TX/PRO/DX INJ NEW DRUG ADDON: CPT

## 2019-11-06 PROCEDURE — 25010000002 PACLITAXEL PER 30 MG: Performed by: NURSE PRACTITIONER

## 2019-11-06 PROCEDURE — 96417 CHEMO IV INFUS EACH ADDL SEQ: CPT

## 2019-11-06 PROCEDURE — 96367 TX/PROPH/DG ADDL SEQ IV INF: CPT

## 2019-11-06 PROCEDURE — 85025 COMPLETE CBC W/AUTO DIFF WBC: CPT | Performed by: NURSE PRACTITIONER

## 2019-11-06 RX ORDER — FAMOTIDINE 10 MG/ML
20 INJECTION, SOLUTION INTRAVENOUS ONCE
Status: COMPLETED | OUTPATIENT
Start: 2019-11-06 | End: 2019-11-06

## 2019-11-06 RX ORDER — SODIUM CHLORIDE 9 MG/ML
250 INJECTION, SOLUTION INTRAVENOUS ONCE
Status: COMPLETED | OUTPATIENT
Start: 2019-11-06 | End: 2019-11-06

## 2019-11-06 RX ORDER — PALONOSETRON 0.05 MG/ML
0.25 INJECTION, SOLUTION INTRAVENOUS ONCE
Status: COMPLETED | OUTPATIENT
Start: 2019-11-06 | End: 2019-11-06

## 2019-11-06 RX ADMIN — DEXAMETHASONE SODIUM PHOSPHATE 20 MG: 10 INJECTION INTRAMUSCULAR; INTRAVENOUS at 10:59

## 2019-11-06 RX ADMIN — SODIUM CHLORIDE 250 ML: 9 INJECTION, SOLUTION INTRAVENOUS at 10:30

## 2019-11-06 RX ADMIN — SODIUM CHLORIDE 150 MG: 9 INJECTION, SOLUTION INTRAVENOUS at 10:33

## 2019-11-06 RX ADMIN — DIPHENHYDRAMINE HYDROCHLORIDE 50 MG: 50 INJECTION INTRAMUSCULAR; INTRAVENOUS at 10:59

## 2019-11-06 RX ADMIN — PALONOSETRON 0.25 MG: 0.25 INJECTION, SOLUTION INTRAVENOUS at 10:32

## 2019-11-06 RX ADMIN — CARBOPLATIN 490 MG: 10 INJECTION, SOLUTION INTRAVENOUS at 14:34

## 2019-11-06 RX ADMIN — FAMOTIDINE 20 MG: 10 INJECTION, SOLUTION INTRAVENOUS at 11:00

## 2019-11-06 RX ADMIN — PACLITAXEL 275 MG: 300 INJECTION INTRAVENOUS at 11:19

## 2019-11-06 NOTE — PROGRESS NOTES
Endometrial cancer (CMS/HCC)    9/18/2019 Initial Diagnosis     Endometrial biopsy by Dr. Lam due to recurrent postmenopausal bleeding. Pathology showed cytologic atypia in background of extensive tumor necrosis.  =36.6. Referred to Gyn Oncology         9/18/2019 Imaging     CT abd/pelvis IMPRESSION:  1. Approximately 7 cm central uterine mass, strongly vascular along its  posterior lateral right margin at approximately 7:00 as noted above. No  evidence of invasion into the surrounding soft tissues.  2. No evidence of adenopathy or other metastatic disease.  3. 2 cm nonenhancing water density right ovarian cyst.  4. Incidentally noted small hepatic cyst and normal variant bilateral  renal parapelvic cysts.  Pre-op CXR ARIS         9/27/2019 Surgery     Exploratory laparotomy, Type 1 radical hysterectomy, BSO, pelvic lymph node dissection, omentectomy, cystoscopy with bilateral temporary ureteral stents.     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 Sarcomatous component is undifferentiated. Cytology negative, no LVSI, nodes negative. Stage IA            Chemotherapy     OP UTERINE PACLitaxel / CARBOplatin (Q21D)

## 2019-11-07 NOTE — PROGRESS NOTES
Oncology Nutrition Screening    Patient Name:  Yin Siddiqui  YOB: 1940  MRN: [id number]  Date:  11/07/19  Physician:  Dr. Claudio    Type of Cancer Treatment:   Surgery:  Exploratory laparotomy, Type 1 radical hysterectomy, BSO, pelvic lymph node dissection, omentectomy, cystoscopy with bilateral temporary ureteral stents (9/27/19)  Radiation/Cyberknife: Brachytherapy insertions x 5  Chemotherapy: Carbo / Taxol - every 21 days x 6 cycles    Patient Active Problem List   Diagnosis   • Menopause   • Fibrocystic breast changes of both breasts   • OAB (overactive bladder)   • Vaginal atrophy   • Urge incontinence   • GERD (gastroesophageal reflux disease)   • Fibrocystic breast changes   • Dyslipidemia   • Degenerative disc disease, lumbar   • Osteopenia   • Submucous leiomyoma of uterus   • Thickened endometrium   • PMB (postmenopausal bleeding)   • Endometrial cancer (CMS/HCC)   • Anxiety disorder due to medical condition       Current Outpatient Medications   Medication Sig Dispense Refill   • CALCIUM-VITAMIN D PO Take 1 tablet by mouth Daily.     • dexamethasone (DECADRON) 4 MG tablet Take 5 tabs the night before chemo then take 2 tabs in the morning on days 2, 3 & 4. Take with food. 11 tablet 5   • docusate sodium (COLACE) 100 MG capsule Take 2 capsules by mouth 2 (Two) Times a Day As Needed for Constipation. 60 capsule 2   • donepezil (ARICEPT) 10 MG tablet Take 10 mg by mouth Every Night.     • loratadine (CLARITIN) 10 MG tablet Take 1 tab the night before chemo then take 1 tab the morning of chemo. 12 tablet 1   • Multiple Vitamins-Minerals (MULTIVITAMIN ADULT PO) Take 1 tablet by mouth Daily.     • Omega-3 Fatty Acids (FISH OIL) 1000 MG capsule capsule Take 1,000 mg by mouth Daily With Breakfast.     • ondansetron (ZOFRAN) 4 MG tablet Take 1 tablet by mouth Every 6 (Six) Hours As Needed for Nausea or Vomiting. 10 tablet 0   • ondansetron (ZOFRAN) 8 MG tablet Take 1 tablet by mouth 3  (Three) Times a Day As Needed for Nausea or Vomiting. 30 tablet 5   • polyethylene glycol (MIRALAX) powder Take 17 g by mouth Daily.     • promethazine (PHENERGAN) 25 MG tablet Take 1 tablet by mouth Every 6 (Six) Hours As Needed for Nausea or Vomiting. 30 tablet 5   • simvastatin (ZOCOR) 10 MG tablet Take 10 mg by mouth every night.     • Xylitol (XYLIMELTS) 550 MG disk Apply  to the mouth or throat Every Night.       No current facility-administered medications for this visit.        Glycemic Risk:   Clarence    Weight:   Height: 63 inches  Weight: 119 lbs.  Usual Body Weight: ~132 lbs.   BMI: 21.1  Normal  Weight has decreased ~13 pounds over last ~4 months; 9.8% weight loss    Oral Food Intake:  Regular Diet - No Restrictions    Hydration Status:   How many 8 ounce glass of water of fluid do you drink per day?  Patient reports drinking mostly water and milk.    Enteral Feeding:   n/a    Nutrition Symptoms:   Altered Apetite  Constipation  Fatigue    Activity:   Normal with no limitations     reports that she has never smoked. She has never used smokeless tobacco. She reports that she does not drink alcohol or use drugs.    Evaluation of Nutritional Risk:   Patient has been identified at nutritional risk due to diagnosis, treatment plan, weight loss, and nutrition impact symptoms.  Met with patient and her  during her chemotherapy infusion appointment.  Nutritional screening completed and confirmed by patient as above.  She reports her appetite and constipation are both improving.    Discussed the importance of good nutrition during her treatment course focusing on adequate calorie, protein, nutrient and fluid intake.  Advised her to be consuming smaller more frequent meals/snacks throughout the day to aid with potential nausea management.  Emphasized the importance of protein and its role in the diet; reviewed high protein foods; and recommended she have a protein source at each meal/snack.  Also  emphasized the importance of hydration and recommended she continue with her good intake of fluids with a goal of at least 64 ounces daily.  Discussed nutritional supplements and their role in the diet and suggested she drink Boost Plus, Ensure Enlive or Garn[employer] Instant Breakfast as needed - samples and coupons provided.    Answered their questions and both voiced understanding of information discussed.  RD's contact information provided and encouraged to call with questions.  Will monitor as needed during her treatment course.    Electronically signed by:  Yenni Abbasi RD  1:48 PM

## 2019-11-11 ENCOUNTER — TELEPHONE (OUTPATIENT)
Dept: GYNECOLOGIC ONCOLOGY | Facility: CLINIC | Age: 79
End: 2019-11-11

## 2019-11-11 NOTE — TELEPHONE ENCOUNTER
Pt's  called and wanted to know if pt can complete the second Shingrix vaccination.  Informed him that she may do this.  She plans on going the day before chemo.

## 2019-11-14 ENCOUNTER — APPOINTMENT (OUTPATIENT)
Dept: ONCOLOGY | Facility: HOSPITAL | Age: 79
End: 2019-11-14

## 2019-11-15 ENCOUNTER — HOSPITAL ENCOUNTER (OUTPATIENT)
Dept: RADIATION ONCOLOGY | Facility: HOSPITAL | Age: 79
Setting detail: RADIATION/ONCOLOGY SERIES
Discharge: HOME | End: 2019-11-15

## 2019-11-15 ENCOUNTER — HOSPITAL ENCOUNTER (OUTPATIENT)
Dept: RADIATION ONCOLOGY | Facility: HOSPITAL | Age: 79
Discharge: HOME | End: 2019-11-15

## 2019-11-15 PROCEDURE — 77295 3-D RADIOTHERAPY PLAN: CPT | Performed by: RADIOLOGY

## 2019-11-15 PROCEDURE — 77280 THER RAD SIMULAJ FIELD SMPL: CPT | Performed by: RADIOLOGY

## 2019-11-15 PROCEDURE — 77316 BRACHYTX ISODOSE PLAN SIMPLE: CPT | Performed by: RADIOLOGY

## 2019-11-19 ENCOUNTER — HOSPITAL ENCOUNTER (OUTPATIENT)
Dept: RADIATION ONCOLOGY | Facility: HOSPITAL | Age: 79
Discharge: HOME | End: 2019-11-19

## 2019-11-19 PROCEDURE — C1717 BRACHYTX, NON-STR,HDR IR-192: HCPCS | Performed by: RADIOLOGY

## 2019-11-19 PROCEDURE — 57156 INS VAG BRACHYTX DEVICE: CPT | Performed by: RADIOLOGY

## 2019-11-19 PROCEDURE — 77770 HDR RDNCL NTRSTL/ICAV BRCHTX: CPT | Performed by: RADIOLOGY

## 2019-11-19 NOTE — PROGRESS NOTES
11/19/2019  Yin Siddiqui    HDR#   1  Patient presents for cylinder brachytherapy.  She has no complaints of urinary discomfort, diarrhea or vaginal irritation.  The cylinder was inserted and treatment of 6 Gy to the surface of the upper vaginal mucosa was delivered. Patient tolerated procedure well with no complications.  Physics survey was negative with no residual radioactivity.  Discharged to home in good condition.  She will return for next treatment.

## 2019-11-21 ENCOUNTER — HOSPITAL ENCOUNTER (OUTPATIENT)
Dept: RADIATION ONCOLOGY | Facility: HOSPITAL | Age: 79
Discharge: HOME | End: 2019-11-21

## 2019-11-21 PROCEDURE — C1717 BRACHYTX, NON-STR,HDR IR-192: HCPCS | Performed by: RADIOLOGY

## 2019-11-21 PROCEDURE — 57156 INS VAG BRACHYTX DEVICE: CPT | Performed by: RADIOLOGY

## 2019-11-21 PROCEDURE — 77770 HDR RDNCL NTRSTL/ICAV BRCHTX: CPT | Performed by: RADIOLOGY

## 2019-11-22 NOTE — PROGRESS NOTES
11/21/2019  Yin Siddiqui    HDR#   2  Patient presents for cylinder brachytherapy.  She has no complaints of urinary discomfort, diarrhea or vaginal irritation.  The cylinder was inserted and treatment of 6 Gy to the surface of the upper vaginal mucosa was delivered. Patient tolerated procedure well with no complications.  Physics survey was negative with no residual radioactivity.  Discharged to home in good condition.  She will return for next treatment.

## 2019-11-25 ENCOUNTER — HOSPITAL ENCOUNTER (OUTPATIENT)
Dept: RADIATION ONCOLOGY | Facility: HOSPITAL | Age: 79
Discharge: HOME | End: 2019-11-25

## 2019-11-25 PROCEDURE — 57156 INS VAG BRACHYTX DEVICE: CPT | Performed by: RADIOLOGY

## 2019-11-25 PROCEDURE — C1717 BRACHYTX, NON-STR,HDR IR-192: HCPCS | Performed by: RADIOLOGY

## 2019-11-25 PROCEDURE — 77770 HDR RDNCL NTRSTL/ICAV BRCHTX: CPT | Performed by: RADIOLOGY

## 2019-11-25 NOTE — PROGRESS NOTES
11/25/2019  Yin Siddiqui    HDR#   3/5  Patient presents for cylinder brachytherapy.  She has no complaints of urinary discomfort, diarrhea or vaginal irritation.  The cylinder was inserted and treatment of 6 Gy to the surface of the upper vaginal mucosa was delivered. Patient tolerated procedure well with no complications.  Physics survey was negative with no residual radioactivity.  Discharged to home in good condition.  She will return for next treatment.

## 2019-11-27 ENCOUNTER — HOSPITAL ENCOUNTER (OUTPATIENT)
Dept: ONCOLOGY | Facility: HOSPITAL | Age: 79
Setting detail: INFUSION SERIES
Discharge: HOME | End: 2019-11-27

## 2019-11-27 ENCOUNTER — OFFICE VISIT (OUTPATIENT)
Dept: GYNECOLOGIC ONCOLOGY | Facility: CLINIC | Age: 79
End: 2019-11-27

## 2019-11-27 VITALS
TEMPERATURE: 97.7 F | BODY MASS INDEX: 21.12 KG/M2 | HEART RATE: 109 BPM | RESPIRATION RATE: 10 BRPM | DIASTOLIC BLOOD PRESSURE: 73 MMHG | SYSTOLIC BLOOD PRESSURE: 148 MMHG | WEIGHT: 119.2 LBS | OXYGEN SATURATION: 96 %

## 2019-11-27 VITALS — SYSTOLIC BLOOD PRESSURE: 125 MMHG | HEART RATE: 71 BPM | DIASTOLIC BLOOD PRESSURE: 61 MMHG

## 2019-11-27 DIAGNOSIS — C54.1 ENDOMETRIAL CANCER (HCC): Primary | ICD-10-CM

## 2019-11-27 DIAGNOSIS — R51.9 SCALP PAIN: ICD-10-CM

## 2019-11-27 LAB
ALBUMIN SERPL-MCNC: 3.9 G/DL (ref 3.5–5.2)
ALBUMIN/GLOB SERPL: 1.4 G/DL
ALP SERPL-CCNC: 86 U/L (ref 39–117)
ALT SERPL W P-5'-P-CCNC: 14 U/L (ref 1–33)
ANION GAP SERPL CALCULATED.3IONS-SCNC: 13 MMOL/L (ref 5–15)
AST SERPL-CCNC: 22 U/L (ref 1–32)
BILIRUB SERPL-MCNC: 0.2 MG/DL (ref 0.2–1.2)
BUN BLD-MCNC: 22 MG/DL (ref 8–23)
BUN/CREAT SERPL: 29.3 (ref 7–25)
CALCIUM SPEC-SCNC: 10 MG/DL (ref 8.6–10.5)
CANCER AG125 SERPL QL: 9.9 U/ML (ref 0–38.1)
CHLORIDE SERPL-SCNC: 105 MMOL/L (ref 98–107)
CO2 SERPL-SCNC: 25 MMOL/L (ref 22–29)
CREAT BLD-MCNC: 0.75 MG/DL (ref 0.57–1)
CREAT BLDA-MCNC: 0.8 MG/DL (ref 0.6–1.3)
CREAT SERPL-MCNC: 0.8 MG/DL
ERYTHROCYTE [DISTWIDTH] IN BLOOD BY AUTOMATED COUNT: 15.7 % (ref 12.3–15.4)
GFR SERPL CREATININE-BSD FRML MDRD: 75 ML/MIN/1.73
GLOBULIN UR ELPH-MCNC: 2.8 GM/DL
GLUCOSE BLD-MCNC: 127 MG/DL (ref 65–99)
HCT VFR BLD AUTO: 38.2 % (ref 34–46.6)
HGB BLD-MCNC: 12.6 G/DL (ref 12–15.9)
LYMPHOCYTES # BLD AUTO: 1.1 10*3/MM3 (ref 0.7–3.1)
LYMPHOCYTES NFR BLD AUTO: 15.9 % (ref 19.6–45.3)
MCH RBC QN AUTO: 29.7 PG (ref 26.6–33)
MCHC RBC AUTO-ENTMCNC: 32.9 G/DL (ref 31.5–35.7)
MCV RBC AUTO: 90.2 FL (ref 79–97)
MONOCYTES # BLD AUTO: 0.5 10*3/MM3 (ref 0.1–0.9)
MONOCYTES NFR BLD AUTO: 7.8 % (ref 5–12)
NEUTROPHILS # BLD AUTO: 5.3 10*3/MM3 (ref 1.7–7)
NEUTROPHILS NFR BLD AUTO: 76.3 % (ref 42.7–76)
PLATELET # BLD AUTO: 193 10*3/MM3 (ref 140–450)
PMV BLD AUTO: 6.9 FL (ref 6–12)
POTASSIUM BLD-SCNC: 4.3 MMOL/L (ref 3.5–5.2)
PROT SERPL-MCNC: 6.7 G/DL (ref 6–8.5)
RBC # BLD AUTO: 4.24 10*6/MM3 (ref 3.77–5.28)
SODIUM BLD-SCNC: 143 MMOL/L (ref 136–145)
WBC NRBC COR # BLD: 6.9 10*3/MM3 (ref 3.4–10.8)

## 2019-11-27 PROCEDURE — 96367 TX/PROPH/DG ADDL SEQ IV INF: CPT

## 2019-11-27 PROCEDURE — 96375 TX/PRO/DX INJ NEW DRUG ADDON: CPT

## 2019-11-27 PROCEDURE — 96415 CHEMO IV INFUSION ADDL HR: CPT

## 2019-11-27 PROCEDURE — 25010000002 PACLITAXEL PER 30 MG: Performed by: OBSTETRICS & GYNECOLOGY

## 2019-11-27 PROCEDURE — 25010000002 CARBOPLATIN PER 50 MG: Performed by: OBSTETRICS & GYNECOLOGY

## 2019-11-27 PROCEDURE — 25010000002 FOSAPREPITANT PER 1 MG: Performed by: OBSTETRICS & GYNECOLOGY

## 2019-11-27 PROCEDURE — 96413 CHEMO IV INFUSION 1 HR: CPT

## 2019-11-27 PROCEDURE — 86304 IMMUNOASSAY TUMOR CA 125: CPT | Performed by: OBSTETRICS & GYNECOLOGY

## 2019-11-27 PROCEDURE — 99214 OFFICE O/P EST MOD 30 MIN: CPT | Performed by: OBSTETRICS & GYNECOLOGY

## 2019-11-27 PROCEDURE — 96417 CHEMO IV INFUS EACH ADDL SEQ: CPT

## 2019-11-27 PROCEDURE — 25010000002 PALONOSETRON 0.25 MG/5ML SOLUTION PREFILLED SYRINGE: Performed by: OBSTETRICS & GYNECOLOGY

## 2019-11-27 PROCEDURE — 80053 COMPREHEN METABOLIC PANEL: CPT | Performed by: OBSTETRICS & GYNECOLOGY

## 2019-11-27 PROCEDURE — 25010000002 DEXAMETHASONE PER 1 MG: Performed by: OBSTETRICS & GYNECOLOGY

## 2019-11-27 PROCEDURE — 82565 ASSAY OF CREATININE: CPT

## 2019-11-27 PROCEDURE — 25010000002 DIPHENHYDRAMINE PER 50 MG: Performed by: OBSTETRICS & GYNECOLOGY

## 2019-11-27 PROCEDURE — 85025 COMPLETE CBC W/AUTO DIFF WBC: CPT | Performed by: OBSTETRICS & GYNECOLOGY

## 2019-11-27 RX ORDER — PALONOSETRON 0.05 MG/ML
0.25 INJECTION, SOLUTION INTRAVENOUS ONCE
Status: CANCELLED | OUTPATIENT
Start: 2019-11-27

## 2019-11-27 RX ORDER — SODIUM CHLORIDE 9 MG/ML
250 INJECTION, SOLUTION INTRAVENOUS ONCE
Status: CANCELLED | OUTPATIENT
Start: 2019-11-27

## 2019-11-27 RX ORDER — FAMOTIDINE 10 MG/ML
20 INJECTION, SOLUTION INTRAVENOUS ONCE
Status: COMPLETED | OUTPATIENT
Start: 2019-11-27 | End: 2019-11-27

## 2019-11-27 RX ORDER — DIPHENHYDRAMINE HYDROCHLORIDE 50 MG/ML
50 INJECTION INTRAMUSCULAR; INTRAVENOUS AS NEEDED
Status: CANCELLED | OUTPATIENT
Start: 2019-11-27

## 2019-11-27 RX ORDER — FAMOTIDINE 10 MG/ML
20 INJECTION, SOLUTION INTRAVENOUS AS NEEDED
Status: CANCELLED | OUTPATIENT
Start: 2019-11-27

## 2019-11-27 RX ORDER — SODIUM CHLORIDE 9 MG/ML
250 INJECTION, SOLUTION INTRAVENOUS ONCE
Status: COMPLETED | OUTPATIENT
Start: 2019-11-27 | End: 2019-11-27

## 2019-11-27 RX ORDER — FAMOTIDINE 10 MG/ML
20 INJECTION, SOLUTION INTRAVENOUS ONCE
Status: CANCELLED | OUTPATIENT
Start: 2019-11-27

## 2019-11-27 RX ORDER — PALONOSETRON 0.05 MG/ML
0.25 INJECTION, SOLUTION INTRAVENOUS ONCE
Status: COMPLETED | OUTPATIENT
Start: 2019-11-27 | End: 2019-11-27

## 2019-11-27 RX ADMIN — CARBOPLATIN 450 MG: 10 INJECTION, SOLUTION INTRAVENOUS at 15:33

## 2019-11-27 RX ADMIN — PALONOSETRON 0.25 MG: 0.25 INJECTION, SOLUTION INTRAVENOUS at 11:35

## 2019-11-27 RX ADMIN — DIPHENHYDRAMINE HYDROCHLORIDE 50 MG: 50 INJECTION INTRAMUSCULAR; INTRAVENOUS at 11:39

## 2019-11-27 RX ADMIN — FAMOTIDINE 20 MG: 10 INJECTION, SOLUTION INTRAVENOUS at 11:37

## 2019-11-27 RX ADMIN — SODIUM CHLORIDE 250 ML: 9 INJECTION, SOLUTION INTRAVENOUS at 11:30

## 2019-11-27 RX ADMIN — DEXAMETHASONE SODIUM PHOSPHATE 20 MG: 10 INJECTION INTRAMUSCULAR; INTRAVENOUS at 11:39

## 2019-11-27 RX ADMIN — SODIUM CHLORIDE 150 MG: 9 INJECTION, SOLUTION INTRAVENOUS at 11:55

## 2019-11-27 RX ADMIN — SODIUM CHLORIDE 275 MG: 9 INJECTION, SOLUTION INTRAVENOUS at 12:35

## 2019-11-27 NOTE — PROGRESS NOTES
Yin Siddiqui  [id number]  1940      Reason for visit: Carcinosarcoma of the uterus, toxicity evaluation, ongoing chemotherapy    History of present illness:  The patient is a 79 y.o. year old female who presents today for treatment and evaluation of the above issues.    Patient is overall doing well today.  She has concerns about being in crowds.  Neutropenic precautions and neutropenia in general were discussed.  Difference between bacterial and viral infections were discussed.  Patient notes good energy and is walking a mile at a time for exercise when the weather is good.  She notes good appetite and excellent drinking of liquids.  She denies neuropathy.  She denies change in bowel bladder function.  She does have a complaint of scalp pain which was very bad about a week ago but is improving.    OBGYN History:  She is a .  . She has history of HRT ~2 yrs. She does not have a history of abnormal pap smears.  Her last colonoscopy was 2019 which was normal.  She had a mammogram of May 2019 that was normal.      Oncologic History:     Endometrial cancer (CMS/HCC)    2019 Initial Diagnosis     Endometrial biopsy by Dr. Lam due to recurrent postmenopausal bleeding. Pathology showed cytologic atypia in background of extensive tumor necrosis.  =36.6. Referred to Gyn Oncology         2019 Imaging     CT abd/pelvis IMPRESSION:  1. Approximately 7 cm central uterine mass, strongly vascular along its  posterior lateral right margin at approximately 7:00 as noted above. No  evidence of invasion into the surrounding soft tissues.  2. No evidence of adenopathy or other metastatic disease.  3. 2 cm nonenhancing water density right ovarian cyst.  4. Incidentally noted small hepatic cyst and normal variant bilateral  renal parapelvic cysts.  Pre-op CXR ARIS         2019 Surgery     Exploratory laparotomy, Type 1 radical hysterectomy, BSO, pelvic lymph node dissection, omentectomy,  cystoscopy with bilateral temporary ureteral stents.     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 Sarcomatous component is undifferentiated. Cytology negative, no LVSI, nodes negative. Stage IA            Chemotherapy     OP UTERINE PACLitaxel / CARBOplatin (Q21D)                Past Medical History:   Diagnosis Date   • Anemia    • Degenerative disc disease, lumbar     hands and knees    • Dyslipidemia    • Endometrial cancer (CMS/HCC) 9/27/2019   • GERD (gastroesophageal reflux disease)    • Hyperlipidemia    • Menopause    • OAB (overactive bladder)    • Osteopenia    • PMB (postmenopausal bleeding)    • Skin cancer    • Urge incontinence    • Vaginal atrophy    • Wears dentures     upper   • Wears reading eyeglasses        Past Surgical History:   Procedure Laterality Date   • CATARACT EXTRACTION W/ INTRAOCULAR LENS  IMPLANT, BILATERAL     • COLONOSCOPY  2019   • CYST REMOVAL      left wrist   • CYSTOSCOPY W/ URETERAL STENT PLACEMENT Bilateral 9/27/2019    Procedure: CYSTOSCOPY WITH BILATERAL TEMPORARY URETERAL STENT INSERTION;  Surgeon: Dasha Claudio MD;  Location:  [facility];  Service: Gynecology   • D&C HYSTEROSCOPY     • EXPLORATORY LAPAROTOMY, TOTAL ABDOMINAL HYSTERECTOMY, SALPINGO OOPHORECTOMY WITH TUMOR DEBULKING Bilateral 9/27/2019    Procedure: EXPLORATORY LAPAROTOMY TYPE 1, RADICAL TOTAL ABDOMINAL HYSTERECTOMY, BILATERAL SALPINGO OOPHORECTOMY, PELVIC LYMPH NODE DISSECTION, OMENTUMECTOMY;  Surgeon: Dasha Claudio MD;  Location:  [facility];  Service: Gynecology   • EYE SURGERY Left 09/05/2019    macular hole    • EYE SURGERY      as a child for muscular problems  x 3   • HAND SURGERY Right     bone removed    • TONSILLECTOMY         MEDICATIONS: The current medication list was reviewed with the patient and  updated in the EMR this date per the Medical Assistant. Medication dosages and frequencies were confirmed to be accurate.      Allergies:  has No Known Allergies.    Social History:   Social History     Socioeconomic History   • Marital status:      Spouse name: Not on file   • Number of children: Not on file   • Years of education: Not on file   • Highest education level: Not on file   Tobacco Use   • Smoking status: Never Smoker   • Smokeless tobacco: Never Used   Substance and Sexual Activity   • Alcohol use: No   • Drug use: No   • Sexual activity: Defer     Partners: Male     Birth control/protection: Post-menopausal       Family History:    Family History   Problem Relation Age of Onset   • Breast cancer Mother 78   • Ovarian cancer Neg Hx        Health Maintenance:    Health Maintenance   Topic Date Due   • TDAP/TD VACCINES (1 - Tdap) 10/03/1961   • ZOSTER VACCINE (1 of 2) 10/03/1992   • PNEUMOCOCCAL VACCINES (65+ LOW/MEDIUM RISK) (1 of 2 - PCV13) 10/03/2007   • MEDICARE ANNUAL WELLNESS  04/10/2017   • DXA SCAN  05/23/2019   • LIPID PANEL  09/26/2019   • MAMMOGRAM  05/15/2021   • COLONOSCOPY  04/16/2029   • INFLUENZA VACCINE  Completed         Review of Systems   Constitutional: Positive for fatigue. Negative for unexpected weight change.   HENT: Negative for ear pain and rhinorrhea.    Eyes: Positive for visual disturbance. Negative for pain and redness.   Respiratory: Negative for cough and shortness of breath.    Cardiovascular: Negative for chest pain and leg swelling.   Gastrointestinal: Negative for abdominal pain, constipation and diarrhea.   Endocrine: Negative for polydipsia and polyphagia.   Genitourinary: Negative for flank pain, pelvic pain and vaginal bleeding.   Musculoskeletal: Positive for arthralgias. Negative for myalgias.   Skin: Negative for pallor and rash.   Allergic/Immunologic: Negative for food allergies and immunocompromised state.   Neurological: Negative for syncope and  light-headedness.        Scalp pain   Hematological: Negative for adenopathy. Does not bruise/bleed easily.   Psychiatric/Behavioral: Negative for dysphoric mood and suicidal ideas.       Physical Exam    Vitals:    11/27/19 0958   BP: 148/73   Pulse: 109   Resp: 10   Temp: 97.7 °F (36.5 °C)   TempSrc: Temporal   SpO2: 96%   Weight: 54.1 kg (119 lb 3.2 oz)   PainSc: 0-No pain     Body mass index is 21.12 kg/m².    Wt Readings from Last 3 Encounters:   11/27/19 54.1 kg (119 lb 3.2 oz)   11/06/19 54 kg (119 lb)   10/22/19 55 kg (121 lb 4.8 oz)         GENERAL: Alert, well-appearing female appearing her stated age who is in no apparent distress.   HEENT: Sclera anicteric. Head normocephalic, atraumatic. Mucus membranes moist.   NECK: Trachea midline, supple, without masses.  No thyromegaly.   BREASTS: Deferred  CARDIOVASCULAR: Normal rate, regular rhythm, no murmurs, rubs, or gallops.  No peripheral edema.  RESPIRATORY: Clear to auscultation bilaterally, normal respiratory effort  BACK:  No CVA tenderness, no vertebral tenderness on palpation  GASTROINTESTINAL:  Abdomen is soft, non-tender, non-distended, no rebound or guarding, no masses, or hernias. No HSM.    SKIN:  Warm, dry, well-perfused.  All visible areas intact.  No rashes, lesions, ulcers.  PSYCHIATRIC: AO x3, with appropriate affect, normal thought processes.  NEUROLOGIC: No focal deficits. Moves extremities well.  MUSCULOSKELETAL: Normal gait and station.   EXTREMITIES:   No cyanosis, clubbing, symmetric.  LYMPHATICS:  No cervical or inguinal adenopathy noted.     PELVIC exam:    GYNECOLOGIC:  External genitalia are free from lesion. On speculum examination, the cervix was free from lesion. On bimanual examination large mass appreciated in the left side.  Uterus was enlarged. There is no cervical motion or uterine tenderness. No cervical mass was palpated. No descensus with the mass.  Uterus feels fixed to left pelvis.  Parametria were smooth. Rectovaginal  exam was confirmatory.     ECOG PS 0    PROCEDURES:  none    Diagnostic Data:      Lab Results   Component Value Date    WBC 6.90 11/27/2019    HGB 12.6 11/27/2019    HCT 38.2 11/27/2019    MCV 90.2 11/27/2019     11/27/2019    NEUTROABS 5.30 11/27/2019    GLUCOSE 127 (H) 11/27/2019    BUN 22 11/27/2019    CREATININE 0.80 11/27/2019    EGFRIFNONA 75 11/27/2019     11/27/2019    K 4.3 11/27/2019     11/27/2019    CO2 25.0 11/27/2019    CALCIUM 10.0 11/27/2019    ALBUMIN 3.90 11/27/2019    AST 22 11/27/2019    ALT 14 11/27/2019    BILITOT 0.2 11/27/2019     Lab Results   Component Value Date     36.6 09/15/2019           Assessment/Plan   This is a 79 y.o. woman who presents for toxicity evaluation while on chemotherapy for carcinosarcoma of the uterus.  Cycle #2 of 6 planned due today pending laboratory evaluation.  Patient tolerating treatment well.  Minimal toxicity.  No dose modification today.  Encouraged ongoing exercise.  Reassured patient regarding scalp pain.  Educated patient regarding neutropenic precautions, avoidance of crowds and when this is appropriate.    Pain assessment was performed today as a part of patient’s care.  For patients with pain related to surgery, gynecologic malignancy or cancer treatment, the plan is as noted in the assessment/plan.  For patients with pain not related to these issues, they are to seek any further needed care from a more appropriate provider, such as PCP.    Orders Placed This Encounter   Procedures   •      Standing Status:   Future     Number of Occurrences:   1     Standing Expiration Date:   11/26/2020   • Comprehensive metabolic panel     Standing Status:   Future     Number of Occurrences:   1     Standing Expiration Date:   11/26/2020   • CBC and Differential     Standing Status:   Future     Number of Occurrences:   1     Standing Expiration Date:   11/26/2020     Order Specific Question:   Manual Differential     Answer:   No   •  CBC & Differential     Standing Status:   Future     Standing Expiration Date:   12/7/2020     Order Specific Question:   Manual Differential     Answer:   No       FOLLOW UP: surgery    I personally spent 25 minutes face-to-face with the patient of which >50% was spent performing counseling/coordiantion of care.    Dasha Claudio MD  11/27/19  11:04 AM

## 2019-12-03 ENCOUNTER — HOSPITAL ENCOUNTER (OUTPATIENT)
Dept: RADIATION ONCOLOGY | Facility: HOSPITAL | Age: 79
Setting detail: RADIATION/ONCOLOGY SERIES
Discharge: HOME | End: 2019-12-03

## 2019-12-03 ENCOUNTER — TELEPHONE (OUTPATIENT)
Dept: GYNECOLOGIC ONCOLOGY | Facility: CLINIC | Age: 79
End: 2019-12-03

## 2019-12-03 ENCOUNTER — HOSPITAL ENCOUNTER (OUTPATIENT)
Dept: ONCOLOGY | Facility: HOSPITAL | Age: 79
Setting detail: INFUSION SERIES
Discharge: HOME | End: 2019-12-03

## 2019-12-03 VITALS
TEMPERATURE: 96.5 F | HEART RATE: 74 BPM | SYSTOLIC BLOOD PRESSURE: 128 MMHG | BODY MASS INDEX: 20.91 KG/M2 | RESPIRATION RATE: 20 BRPM | WEIGHT: 118 LBS | DIASTOLIC BLOOD PRESSURE: 61 MMHG | HEIGHT: 63 IN

## 2019-12-03 DIAGNOSIS — G89.18 JOINT PAIN FOLLOWING CHEMOTHERAPY: Primary | ICD-10-CM

## 2019-12-03 DIAGNOSIS — C54.1 ENDOMETRIAL CANCER (HCC): ICD-10-CM

## 2019-12-03 DIAGNOSIS — C54.1 ENDOMETRIAL CANCER (HCC): Primary | ICD-10-CM

## 2019-12-03 DIAGNOSIS — M25.50 JOINT PAIN FOLLOWING CHEMOTHERAPY: Primary | ICD-10-CM

## 2019-12-03 DIAGNOSIS — E86.0 DEHYDRATION: ICD-10-CM

## 2019-12-03 LAB
ALBUMIN SERPL-MCNC: 3.8 G/DL (ref 3.5–5.2)
ALBUMIN/GLOB SERPL: 1.5 G/DL
ALP SERPL-CCNC: 80 U/L (ref 39–117)
ALT SERPL W P-5'-P-CCNC: 29 U/L (ref 1–33)
ANION GAP SERPL CALCULATED.3IONS-SCNC: 10 MMOL/L (ref 5–15)
AST SERPL-CCNC: 26 U/L (ref 1–32)
BILIRUB SERPL-MCNC: 0.2 MG/DL (ref 0.2–1.2)
BUN BLD-MCNC: 28 MG/DL (ref 8–23)
BUN/CREAT SERPL: 29.5 (ref 7–25)
CA-I SERPL ISE-MCNC: 1.34 MMOL/L (ref 1.12–1.32)
CALCIUM SPEC-SCNC: 9.5 MG/DL (ref 8.6–10.5)
CHLORIDE SERPL-SCNC: 101 MMOL/L (ref 98–107)
CO2 SERPL-SCNC: 25 MMOL/L (ref 22–29)
CREAT BLD-MCNC: 0.95 MG/DL (ref 0.57–1)
ERYTHROCYTE [DISTWIDTH] IN BLOOD BY AUTOMATED COUNT: 16 % (ref 12.3–15.4)
GFR SERPL CREATININE-BSD FRML MDRD: 57 ML/MIN/1.73
GLOBULIN UR ELPH-MCNC: 2.5 GM/DL
GLUCOSE BLD-MCNC: 107 MG/DL (ref 65–99)
HCT VFR BLD AUTO: 39.6 % (ref 34–46.6)
HGB BLD-MCNC: 13.1 G/DL (ref 12–15.9)
LYMPHOCYTES # BLD AUTO: 1.4 10*3/MM3 (ref 0.7–3.1)
LYMPHOCYTES NFR BLD AUTO: 36.3 % (ref 19.6–45.3)
MAGNESIUM SERPL-MCNC: 1.8 MG/DL (ref 1.6–2.4)
MCH RBC QN AUTO: 30.3 PG (ref 26.6–33)
MCHC RBC AUTO-ENTMCNC: 33.2 G/DL (ref 31.5–35.7)
MCV RBC AUTO: 91.2 FL (ref 79–97)
MONOCYTES # BLD AUTO: 0.2 10*3/MM3 (ref 0.1–0.9)
MONOCYTES NFR BLD AUTO: 5.4 % (ref 5–12)
NEUTROPHILS # BLD AUTO: 2.3 10*3/MM3 (ref 1.7–7)
NEUTROPHILS NFR BLD AUTO: 58.3 % (ref 42.7–76)
PLATELET # BLD AUTO: 137 10*3/MM3 (ref 140–450)
PMV BLD AUTO: 7.2 FL (ref 6–12)
POTASSIUM BLD-SCNC: 4.4 MMOL/L (ref 3.5–5.2)
PROT SERPL-MCNC: 6.3 G/DL (ref 6–8.5)
RBC # BLD AUTO: 4.34 10*6/MM3 (ref 3.77–5.28)
SODIUM BLD-SCNC: 136 MMOL/L (ref 136–145)
WBC NRBC COR # BLD: 3.9 10*3/MM3 (ref 3.4–10.8)

## 2019-12-03 PROCEDURE — 57156 INS VAG BRACHYTX DEVICE: CPT | Performed by: RADIOLOGY

## 2019-12-03 PROCEDURE — 77770 HDR RDNCL NTRSTL/ICAV BRCHTX: CPT | Performed by: RADIOLOGY

## 2019-12-03 PROCEDURE — C1717 BRACHYTX, NON-STR,HDR IR-192: HCPCS | Performed by: RADIOLOGY

## 2019-12-03 PROCEDURE — 85025 COMPLETE CBC W/AUTO DIFF WBC: CPT | Performed by: NURSE PRACTITIONER

## 2019-12-03 PROCEDURE — 96360 HYDRATION IV INFUSION INIT: CPT

## 2019-12-03 PROCEDURE — 80053 COMPREHEN METABOLIC PANEL: CPT | Performed by: NURSE PRACTITIONER

## 2019-12-03 PROCEDURE — 83735 ASSAY OF MAGNESIUM: CPT | Performed by: NURSE PRACTITIONER

## 2019-12-03 PROCEDURE — 82330 ASSAY OF CALCIUM: CPT | Performed by: NURSE PRACTITIONER

## 2019-12-03 RX ORDER — TRAMADOL HYDROCHLORIDE 50 MG/1
50 TABLET ORAL EVERY 6 HOURS PRN
Qty: 20 TABLET | Refills: 0 | Status: SHIPPED | OUTPATIENT
Start: 2019-12-03 | End: 2020-09-08

## 2019-12-03 RX ORDER — SODIUM CHLORIDE 9 MG/ML
999 INJECTION, SOLUTION INTRAVENOUS ONCE
Status: CANCELLED | OUTPATIENT
Start: 2019-12-03

## 2019-12-03 RX ADMIN — SODIUM CHLORIDE 1000 ML: 9 INJECTION, SOLUTION INTRAVENOUS at 13:20

## 2019-12-03 NOTE — PROGRESS NOTES
12/03/2019  Yin Siddiqui    HDR#   4  Patient presents for cylinder brachytherapy.  She has no complaints of urinary discomfort, diarrhea or vaginal irritation.  She is unfortunately experiencing a lot of nausea right now as well as fatigue, which is attributed to her chemotherapy.  She received fluids via IV earlier today. The cylinder was inserted and treatment of 6 Gy to the surface of the upper vaginal mucosa was delivered. Patient tolerated procedure well with no complications.  Physics survey was negative with no residual radioactivity.  Discharged to home in good condition.  She will return for next treatment.

## 2019-12-03 NOTE — TELEPHONE ENCOUNTER
Pt's  called and left message.  I returned his call.  He states pt is very fatigued and not eating or drinking well for the past few days.  She has no nausea or pain.  No fevers.  Discussed with APRN.  Pt to come to op infusion at 1:30pm for labs and IV fluids.  Pt's  verbalized understanding.

## 2019-12-05 ENCOUNTER — HOSPITAL ENCOUNTER (OUTPATIENT)
Dept: ONCOLOGY | Facility: HOSPITAL | Age: 79
Setting detail: INFUSION SERIES
Discharge: HOME | End: 2019-12-05

## 2019-12-05 ENCOUNTER — HOSPITAL ENCOUNTER (OUTPATIENT)
Dept: RADIATION ONCOLOGY | Facility: HOSPITAL | Age: 79
Discharge: HOME | End: 2019-12-05

## 2019-12-05 DIAGNOSIS — C54.1 ENDOMETRIAL CANCER (HCC): Primary | ICD-10-CM

## 2019-12-05 PROCEDURE — C1717 BRACHYTX, NON-STR,HDR IR-192: HCPCS | Performed by: RADIOLOGY

## 2019-12-05 PROCEDURE — 96361 HYDRATE IV INFUSION ADD-ON: CPT

## 2019-12-05 PROCEDURE — 57156 INS VAG BRACHYTX DEVICE: CPT | Performed by: RADIOLOGY

## 2019-12-05 PROCEDURE — 77336 RADIATION PHYSICS CONSULT: CPT | Performed by: RADIOLOGY

## 2019-12-05 PROCEDURE — 77770 HDR RDNCL NTRSTL/ICAV BRCHTX: CPT | Performed by: RADIOLOGY

## 2019-12-05 PROCEDURE — 96360 HYDRATION IV INFUSION INIT: CPT

## 2019-12-05 RX ORDER — SODIUM CHLORIDE 9 MG/ML
1000 INJECTION, SOLUTION INTRAVENOUS ONCE
Status: COMPLETED | OUTPATIENT
Start: 2019-12-05 | End: 2019-12-05

## 2019-12-05 RX ORDER — SODIUM CHLORIDE 9 MG/ML
999 INJECTION, SOLUTION INTRAVENOUS ONCE
Status: CANCELLED | OUTPATIENT
Start: 2019-12-05

## 2019-12-05 RX ADMIN — SODIUM CHLORIDE 1000 ML: 9 INJECTION, SOLUTION INTRAVENOUS at 15:05

## 2019-12-05 NOTE — PROGRESS NOTES
12/05/2019  Yin Siddiqui    HDR#   5  Patient presents for cylinder brachytherapy.  She has no complaints of urinary discomfort, diarrhea or vaginal irritation.  The cylinder was inserted and treatment of 6 Gy to the surface of the upper vaginal mucosa was delivered. Patient tolerated procedure well with no complications.  Physics survey was negative with no residual radioactivity.  Discharged to home in good condition.  She will return to Radiation Oncology in the next few weeks for follow up.

## 2019-12-06 ENCOUNTER — TELEPHONE (OUTPATIENT)
Dept: GYNECOLOGIC ONCOLOGY | Facility: CLINIC | Age: 79
End: 2019-12-06

## 2019-12-06 NOTE — TELEPHONE ENCOUNTER
I called to check on pt.  She was sleeping.  Her  says she has felt much better since having 2 days of IV fluids this week.  They have all upcoming appt dates and will call our office with any questions or concerns.

## 2019-12-09 ENCOUNTER — TELEPHONE (OUTPATIENT)
Dept: GYNECOLOGIC ONCOLOGY | Facility: CLINIC | Age: 79
End: 2019-12-09

## 2019-12-09 ENCOUNTER — LAB (OUTPATIENT)
Dept: LAB | Facility: HOSPITAL | Age: 79
End: 2019-12-09

## 2019-12-09 DIAGNOSIS — C54.1 ENDOMETRIAL CANCER (HCC): ICD-10-CM

## 2019-12-09 PROBLEM — T45.1X5A CHEMOTHERAPY-INDUCED NEUTROPENIA (HCC): Status: ACTIVE | Noted: 2019-12-09

## 2019-12-09 PROBLEM — Z91.89 AT RISK FOR INFECTION: Status: ACTIVE | Noted: 2019-12-09

## 2019-12-09 PROBLEM — D70.1 CHEMOTHERAPY-INDUCED NEUTROPENIA (HCC): Status: ACTIVE | Noted: 2019-12-09

## 2019-12-09 LAB
BASOPHILS # BLD AUTO: 0.01 10*3/MM3 (ref 0–0.2)
BASOPHILS NFR BLD AUTO: 0.5 % (ref 0–1.5)
DEPRECATED RDW RBC AUTO: 48.4 FL (ref 37–54)
EOSINOPHIL # BLD AUTO: 0.13 10*3/MM3 (ref 0–0.4)
EOSINOPHIL NFR BLD AUTO: 6.6 % (ref 0.3–6.2)
ERYTHROCYTE [DISTWIDTH] IN BLOOD BY AUTOMATED COUNT: 14.9 % (ref 12.3–15.4)
HCT VFR BLD AUTO: 39.6 % (ref 34–46.6)
HGB BLD-MCNC: 12.6 G/DL (ref 12–15.9)
IMM GRANULOCYTES # BLD AUTO: 0.01 10*3/MM3 (ref 0–0.05)
IMM GRANULOCYTES NFR BLD AUTO: 0.5 % (ref 0–0.5)
LYMPHOCYTES # BLD AUTO: 1.08 10*3/MM3 (ref 0.7–3.1)
LYMPHOCYTES NFR BLD AUTO: 55.1 % (ref 19.6–45.3)
MCH RBC QN AUTO: 29.3 PG (ref 26.6–33)
MCHC RBC AUTO-ENTMCNC: 31.8 G/DL (ref 31.5–35.7)
MCV RBC AUTO: 92.1 FL (ref 79–97)
MONOCYTES # BLD AUTO: 0.33 10*3/MM3 (ref 0.1–0.9)
MONOCYTES NFR BLD AUTO: 16.8 % (ref 5–12)
NEUTROPHILS # BLD AUTO: 0.4 10*3/MM3 (ref 1.7–7)
NEUTROPHILS NFR BLD AUTO: 20.5 % (ref 42.7–76)
NRBC BLD AUTO-RTO: 0 /100 WBC (ref 0–0.2)
PLAT MORPH BLD: NORMAL
PLATELET # BLD AUTO: 225 10*3/MM3 (ref 140–450)
PMV BLD AUTO: 9.8 FL (ref 6–12)
RBC # BLD AUTO: 4.3 10*6/MM3 (ref 3.77–5.28)
RBC MORPH BLD: NORMAL
WBC MORPH BLD: NORMAL
WBC NRBC COR # BLD: 1.96 10*3/MM3 (ref 3.4–10.8)

## 2019-12-09 PROCEDURE — 85025 COMPLETE CBC W/AUTO DIFF WBC: CPT

## 2019-12-09 PROCEDURE — 36415 COLL VENOUS BLD VENIPUNCTURE: CPT

## 2019-12-09 PROCEDURE — 85007 BL SMEAR W/DIFF WBC COUNT: CPT

## 2019-12-09 NOTE — TELEPHONE ENCOUNTER
After listening to voicemail left by pt's  that no orders were in on 12/06/19 for [facility] labs.  I called and spoke with Kina at  Lab at [facility] [place].  She said pt and  were in on 12/06/19 and she did see the order for a cbc&diff but pt refused to let her draw her it and she was attiment that this was not the correct lab.  Clarified with Kina that this was the correct lab.  Kina contacting pt to inform her to come in today for lab draw.  She will me with any questions or concerns.

## 2019-12-09 NOTE — TELEPHONE ENCOUNTER
I called pt to inform of neutropenia on [facility] labs.  Her  answered.  Pt is resting. Informed him and instructed on neutropenic precautions.  Advised that pt and he read through the information sheet on neutropenic precautions given to them during pt's chemotherapy education.  Informed Neupogen ordered and awaiting insurance authorization.  He agreed and pt / family will call call back with any signs or symptoms of infection or with any other questions or concerns.

## 2019-12-11 ENCOUNTER — TELEPHONE (OUTPATIENT)
Dept: GYNECOLOGIC ONCOLOGY | Facility: CLINIC | Age: 79
End: 2019-12-11

## 2019-12-11 ENCOUNTER — INFUSION (OUTPATIENT)
Dept: ONCOLOGY | Facility: HOSPITAL | Age: 79
End: 2019-12-11

## 2019-12-11 VITALS
TEMPERATURE: 98.3 F | HEART RATE: 71 BPM | DIASTOLIC BLOOD PRESSURE: 70 MMHG | RESPIRATION RATE: 16 BRPM | SYSTOLIC BLOOD PRESSURE: 123 MMHG

## 2019-12-11 DIAGNOSIS — C54.1 ENDOMETRIAL CANCER (HCC): ICD-10-CM

## 2019-12-11 DIAGNOSIS — D70.1 CHEMOTHERAPY-INDUCED NEUTROPENIA (HCC): Primary | ICD-10-CM

## 2019-12-11 DIAGNOSIS — Z91.89 AT RISK FOR INFECTION: ICD-10-CM

## 2019-12-11 DIAGNOSIS — T45.1X5A CHEMOTHERAPY-INDUCED NEUTROPENIA (HCC): Primary | ICD-10-CM

## 2019-12-11 PROCEDURE — 25010000002 FILGRASTIM PER 480 MCG: Performed by: NURSE PRACTITIONER

## 2019-12-11 PROCEDURE — 96372 THER/PROPH/DIAG INJ SC/IM: CPT

## 2019-12-11 RX ADMIN — FILGRASTIM 480 MCG: 480 INJECTION, SOLUTION INTRAVENOUS; SUBCUTANEOUS at 11:34

## 2019-12-11 NOTE — TELEPHONE ENCOUNTER
I called and informed pt /  that Neupogen has been approved and she is scheduled to start today at the [facility] location at 11:30am.  She will schedule for tomorrow's appt while at infusion today.  Also informed them Neulasta has been approved and this will start after next chemo infusion.  Will discuss in detail at visit here prior to chemo.  Advised starting Claritin today before injection and for the next 5 - 7 days to hopefully decrease bone / joint pain.  They verbalized understanding and will call back with any questions or concerns.

## 2019-12-12 ENCOUNTER — INFUSION (OUTPATIENT)
Dept: ONCOLOGY | Facility: HOSPITAL | Age: 79
End: 2019-12-12

## 2019-12-12 VITALS
HEART RATE: 77 BPM | RESPIRATION RATE: 16 BRPM | SYSTOLIC BLOOD PRESSURE: 135 MMHG | TEMPERATURE: 98.7 F | DIASTOLIC BLOOD PRESSURE: 76 MMHG

## 2019-12-12 DIAGNOSIS — D70.1 CHEMOTHERAPY-INDUCED NEUTROPENIA (HCC): Primary | ICD-10-CM

## 2019-12-12 DIAGNOSIS — C54.1 ENDOMETRIAL CANCER (HCC): ICD-10-CM

## 2019-12-12 DIAGNOSIS — T45.1X5A CHEMOTHERAPY-INDUCED NEUTROPENIA (HCC): Primary | ICD-10-CM

## 2019-12-12 DIAGNOSIS — Z91.89 AT RISK FOR INFECTION: ICD-10-CM

## 2019-12-12 PROCEDURE — 25010000002 FILGRASTIM PER 480 MCG: Performed by: NURSE PRACTITIONER

## 2019-12-12 PROCEDURE — 96372 THER/PROPH/DIAG INJ SC/IM: CPT

## 2019-12-12 RX ADMIN — FILGRASTIM 480 MCG: 480 INJECTION, SOLUTION INTRAVENOUS; SUBCUTANEOUS at 10:47

## 2019-12-18 ENCOUNTER — HOSPITAL ENCOUNTER (OUTPATIENT)
Dept: ONCOLOGY | Facility: HOSPITAL | Age: 79
Setting detail: INFUSION SERIES
Discharge: HOME | End: 2019-12-18

## 2019-12-18 ENCOUNTER — DOCUMENTATION (OUTPATIENT)
Dept: NUTRITION | Facility: HOSPITAL | Age: 79
End: 2019-12-18

## 2019-12-18 ENCOUNTER — OFFICE VISIT (OUTPATIENT)
Dept: GYNECOLOGIC ONCOLOGY | Facility: CLINIC | Age: 79
End: 2019-12-18

## 2019-12-18 VITALS
RESPIRATION RATE: 12 BRPM | DIASTOLIC BLOOD PRESSURE: 71 MMHG | WEIGHT: 116 LBS | TEMPERATURE: 98 F | BODY MASS INDEX: 20.55 KG/M2 | HEART RATE: 113 BPM | OXYGEN SATURATION: 98 % | SYSTOLIC BLOOD PRESSURE: 129 MMHG

## 2019-12-18 VITALS
TEMPERATURE: 98.2 F | DIASTOLIC BLOOD PRESSURE: 78 MMHG | RESPIRATION RATE: 14 BRPM | HEART RATE: 98 BPM | SYSTOLIC BLOOD PRESSURE: 124 MMHG

## 2019-12-18 DIAGNOSIS — D70.1 CHEMOTHERAPY-INDUCED NEUTROPENIA (HCC): ICD-10-CM

## 2019-12-18 DIAGNOSIS — F06.4 ANXIETY DISORDER DUE TO MEDICAL CONDITION: ICD-10-CM

## 2019-12-18 DIAGNOSIS — T45.1X5A CHEMOTHERAPY-INDUCED NEUTROPENIA (HCC): ICD-10-CM

## 2019-12-18 DIAGNOSIS — C54.1 ENDOMETRIAL CANCER (HCC): Primary | ICD-10-CM

## 2019-12-18 LAB
ALBUMIN SERPL-MCNC: 4.3 G/DL (ref 3.5–5.2)
ALBUMIN/GLOB SERPL: 1.5 G/DL
ALP SERPL-CCNC: 112 U/L (ref 39–117)
ALT SERPL W P-5'-P-CCNC: 21 U/L (ref 1–33)
ANION GAP SERPL CALCULATED.3IONS-SCNC: 12 MMOL/L (ref 5–15)
AST SERPL-CCNC: 27 U/L (ref 1–32)
BILIRUB SERPL-MCNC: 0.3 MG/DL (ref 0.2–1.2)
BUN BLD-MCNC: 20 MG/DL (ref 8–23)
BUN/CREAT SERPL: 23 (ref 7–25)
CALCIUM SPEC-SCNC: 9.7 MG/DL (ref 8.6–10.5)
CANCER AG125 SERPL QL: 9.6 U/ML (ref 0–38.1)
CHLORIDE SERPL-SCNC: 104 MMOL/L (ref 98–107)
CO2 SERPL-SCNC: 24 MMOL/L (ref 22–29)
CREAT BLD-MCNC: 0.87 MG/DL (ref 0.57–1)
CREAT BLDA-MCNC: 0.8 MG/DL (ref 0.6–1.3)
ERYTHROCYTE [DISTWIDTH] IN BLOOD BY AUTOMATED COUNT: 19.5 % (ref 12.3–15.4)
GFR SERPL CREATININE-BSD FRML MDRD: 63 ML/MIN/1.73
GLOBULIN UR ELPH-MCNC: 2.8 GM/DL
GLUCOSE BLD-MCNC: 106 MG/DL (ref 65–99)
HCT VFR BLD AUTO: 40.6 % (ref 34–46.6)
HGB BLD-MCNC: 13.2 G/DL (ref 12–15.9)
LYMPHOCYTES # BLD AUTO: 1.2 10*3/MM3 (ref 0.7–3.1)
LYMPHOCYTES NFR BLD AUTO: 17.5 % (ref 19.6–45.3)
MCH RBC QN AUTO: 30.2 PG (ref 26.6–33)
MCHC RBC AUTO-ENTMCNC: 32.4 G/DL (ref 31.5–35.7)
MCV RBC AUTO: 93.1 FL (ref 79–97)
MONOCYTES # BLD AUTO: 0.4 10*3/MM3 (ref 0.1–0.9)
MONOCYTES NFR BLD AUTO: 6.3 % (ref 5–12)
NEUTROPHILS # BLD AUTO: 5.4 10*3/MM3 (ref 1.7–7)
NEUTROPHILS NFR BLD AUTO: 76.2 % (ref 42.7–76)
PLATELET # BLD AUTO: 213 10*3/MM3 (ref 140–450)
PMV BLD AUTO: 6.7 FL (ref 6–12)
POTASSIUM BLD-SCNC: 4.2 MMOL/L (ref 3.5–5.2)
PROT SERPL-MCNC: 7.1 G/DL (ref 6–8.5)
RBC # BLD AUTO: 4.36 10*6/MM3 (ref 3.77–5.28)
SODIUM BLD-SCNC: 140 MMOL/L (ref 136–145)
WBC NRBC COR # BLD: 7.1 10*3/MM3 (ref 3.4–10.8)

## 2019-12-18 PROCEDURE — 80053 COMPREHEN METABOLIC PANEL: CPT | Performed by: OBSTETRICS & GYNECOLOGY

## 2019-12-18 PROCEDURE — 25010000002 PACLITAXEL PER 30 MG: Performed by: OBSTETRICS & GYNECOLOGY

## 2019-12-18 PROCEDURE — 99214 OFFICE O/P EST MOD 30 MIN: CPT | Performed by: OBSTETRICS & GYNECOLOGY

## 2019-12-18 PROCEDURE — 82565 ASSAY OF CREATININE: CPT

## 2019-12-18 PROCEDURE — 96374 THER/PROPH/DIAG INJ IV PUSH: CPT

## 2019-12-18 PROCEDURE — 86304 IMMUNOASSAY TUMOR CA 125: CPT | Performed by: OBSTETRICS & GYNECOLOGY

## 2019-12-18 PROCEDURE — 96417 CHEMO IV INFUS EACH ADDL SEQ: CPT

## 2019-12-18 PROCEDURE — 25010000002 DIPHENHYDRAMINE PER 50 MG: Performed by: OBSTETRICS & GYNECOLOGY

## 2019-12-18 PROCEDURE — 25010000002 PEGFILGRASTIM 6 MG/0.6ML PREFILLED SYRINGE KIT: Performed by: OBSTETRICS & GYNECOLOGY

## 2019-12-18 PROCEDURE — 96367 TX/PROPH/DG ADDL SEQ IV INF: CPT

## 2019-12-18 PROCEDURE — 85025 COMPLETE CBC W/AUTO DIFF WBC: CPT | Performed by: OBSTETRICS & GYNECOLOGY

## 2019-12-18 PROCEDURE — 25010000002 CARBOPLATIN PER 50 MG: Performed by: OBSTETRICS & GYNECOLOGY

## 2019-12-18 PROCEDURE — 96377 APPLICATON ON-BODY INJECTOR: CPT

## 2019-12-18 PROCEDURE — 96415 CHEMO IV INFUSION ADDL HR: CPT

## 2019-12-18 PROCEDURE — 25010000002 PALONOSETRON 0.25 MG/5ML SOLUTION PREFILLED SYRINGE: Performed by: OBSTETRICS & GYNECOLOGY

## 2019-12-18 PROCEDURE — 25010000002 FOSAPREPITANT PER 1 MG: Performed by: OBSTETRICS & GYNECOLOGY

## 2019-12-18 PROCEDURE — 96375 TX/PRO/DX INJ NEW DRUG ADDON: CPT

## 2019-12-18 PROCEDURE — 25010000002 DEXAMETHASONE PER 1 MG: Performed by: OBSTETRICS & GYNECOLOGY

## 2019-12-18 PROCEDURE — 96413 CHEMO IV INFUSION 1 HR: CPT

## 2019-12-18 RX ORDER — DIPHENHYDRAMINE HYDROCHLORIDE 50 MG/ML
50 INJECTION INTRAMUSCULAR; INTRAVENOUS AS NEEDED
Status: CANCELLED | OUTPATIENT
Start: 2019-12-18

## 2019-12-18 RX ORDER — PALONOSETRON 0.05 MG/ML
0.25 INJECTION, SOLUTION INTRAVENOUS ONCE
Status: CANCELLED | OUTPATIENT
Start: 2019-12-18

## 2019-12-18 RX ORDER — PALONOSETRON 0.05 MG/ML
0.25 INJECTION, SOLUTION INTRAVENOUS ONCE
Status: COMPLETED | OUTPATIENT
Start: 2019-12-18 | End: 2019-12-18

## 2019-12-18 RX ORDER — SODIUM CHLORIDE 9 MG/ML
250 INJECTION, SOLUTION INTRAVENOUS ONCE
Status: COMPLETED | OUTPATIENT
Start: 2019-12-18 | End: 2019-12-18

## 2019-12-18 RX ORDER — FAMOTIDINE 10 MG/ML
20 INJECTION, SOLUTION INTRAVENOUS ONCE
Status: COMPLETED | OUTPATIENT
Start: 2019-12-18 | End: 2019-12-18

## 2019-12-18 RX ORDER — FAMOTIDINE 10 MG/ML
20 INJECTION, SOLUTION INTRAVENOUS AS NEEDED
Status: CANCELLED | OUTPATIENT
Start: 2019-12-18

## 2019-12-18 RX ORDER — SODIUM CHLORIDE 9 MG/ML
250 INJECTION, SOLUTION INTRAVENOUS ONCE
Status: CANCELLED | OUTPATIENT
Start: 2019-12-18

## 2019-12-18 RX ORDER — FAMOTIDINE 10 MG/ML
20 INJECTION, SOLUTION INTRAVENOUS ONCE
Status: CANCELLED | OUTPATIENT
Start: 2019-12-18

## 2019-12-18 RX ADMIN — SODIUM CHLORIDE 275 MG: 9 INJECTION, SOLUTION INTRAVENOUS at 11:28

## 2019-12-18 RX ADMIN — CARBOPLATIN 440 MG: 10 INJECTION, SOLUTION INTRAVENOUS at 14:49

## 2019-12-18 RX ADMIN — DIPHENHYDRAMINE HYDROCHLORIDE 50 MG: 50 INJECTION INTRAMUSCULAR; INTRAVENOUS at 10:48

## 2019-12-18 RX ADMIN — PALONOSETRON 0.25 MG: 0.25 INJECTION, SOLUTION INTRAVENOUS at 10:48

## 2019-12-18 RX ADMIN — SODIUM CHLORIDE 150 MG: 9 INJECTION, SOLUTION INTRAVENOUS at 11:05

## 2019-12-18 RX ADMIN — DEXAMETHASONE SODIUM PHOSPHATE 20 MG: 4 INJECTION, SOLUTION INTRAMUSCULAR; INTRAVENOUS at 10:48

## 2019-12-18 RX ADMIN — SODIUM CHLORIDE 250 ML: 9 INJECTION, SOLUTION INTRAVENOUS at 10:48

## 2019-12-18 RX ADMIN — PEGFILGRASTIM 6 MG: KIT SUBCUTANEOUS at 15:18

## 2019-12-18 RX ADMIN — FAMOTIDINE 20 MG: 10 INJECTION, SOLUTION INTRAVENOUS at 10:48

## 2019-12-18 NOTE — PROGRESS NOTES
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 year old female who presents today for treatment and evaluation of the above issues.    Patient reports she is overall felling okay. She reports she does have days with low energy. She generally takes a nap daily. She denies numbness and tingling. However, she does report she feels weak and has poor balance. Over the past week, she has been eating well. On days when she cannot eat well, she is able to drink.     She is concerned about her IV access. She reports she is bruising at the site of the IV as well as a bump on her wrist. She reports she had pain in her hands, knees, and toes that resolved with IV fluids after last cycle. She continues to take stool softeners and reports that she is able to have regular bowel movements. She had neutropenia after last cycle and she received Neupogen.       OBGYN History:  She is a .  . She has history of HRT ~2 yrs. She does not have a history of abnormal pap smears.  Her last colonoscopy was 2019 which was normal.  She had a mammogram of May 2019 that was normal.      Oncologic History:     Endometrial cancer (CMS/HCC)    2019 Initial Diagnosis     Endometrial biopsy by Dr. Lam due to recurrent postmenopausal bleeding. Pathology showed cytologic atypia in background of extensive tumor necrosis.  =36.6. Referred to Gyn Oncology      2019 Imaging     CT abd/pelvis IMPRESSION:  1. Approximately 7 cm central uterine mass, strongly vascular along its  posterior lateral right margin at approximately 7:00 as noted above. No  evidence of invasion into the surrounding soft tissues.  2. No evidence of adenopathy or other metastatic disease.  3. 2 cm nonenhancing water density right ovarian cyst.  4. Incidentally noted small hepatic  cyst and normal variant bilateral  renal parapelvic cysts.  Pre-op CXR ARIS      9/27/2019 Surgery     Exploratory laparotomy, Type 1 radical hysterectomy, BSO, pelvic lymph node dissection, omentectomy, cystoscopy with bilateral temporary ureteral stents.     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 Sarcomatous component is undifferentiated. Cytology negative, no LVSI, nodes negative. Stage IA         Chemotherapy     OP UTERINE PACLitaxel / CARBOplatin (Q21D)        11/19/2019 - 12/5/2019 Radiation     Radiation OncologyTreatment Course:  Yin Siddiqui received 3000 cGy in 5 fractions to vagina via High Dose Radiation - HDR.      12/11/2019 -  Chemotherapy     OP FILGRASTIM (NEUPOGEN)           Past Medical History:   Diagnosis Date   • Anemia    • Degenerative disc disease, lumbar     hands and knees    • Dyslipidemia    • Endometrial cancer (CMS/HCC) 9/27/2019   • GERD (gastroesophageal reflux disease)    • Hyperlipidemia    • Menopause    • OAB (overactive bladder)    • Osteopenia    • PMB (postmenopausal bleeding)    • Skin cancer    • Urge incontinence    • Vaginal atrophy    • Wears dentures     upper   • Wears reading eyeglasses        Past Surgical History:   Procedure Laterality Date   • CATARACT EXTRACTION W/ INTRAOCULAR LENS  IMPLANT, BILATERAL     • COLONOSCOPY  2019   • CYST REMOVAL      left wrist   • CYSTOSCOPY W/ URETERAL STENT PLACEMENT Bilateral 9/27/2019    Procedure: CYSTOSCOPY WITH BILATERAL TEMPORARY URETERAL STENT INSERTION;  Surgeon: Dasha Claudio MD;  Location: [facility];  Service: Gynecology   • D&C HYSTEROSCOPY     • EXPLORATORY LAPAROTOMY, TOTAL ABDOMINAL HYSTERECTOMY, SALPINGO OOPHORECTOMY WITH TUMOR DEBULKING Bilateral 9/27/2019    Procedure: EXPLORATORY LAPAROTOMY TYPE 1, RADICAL TOTAL  ABDOMINAL HYSTERECTOMY, BILATERAL SALPINGO OOPHORECTOMY, PELVIC LYMPH NODE DISSECTION, OMENTUMECTOMY;  Surgeon: Dasha Claudio MD;  Location: [facility];  Service: Gynecology   • EYE SURGERY Left 09/05/2019    macular hole    • EYE SURGERY      as a child for muscular problems  x 3   • HAND SURGERY Right     bone removed    • TONSILLECTOMY         MEDICATIONS: The current medication list was reviewed with the patient and updated in the EMR this date per the Medical Assistant. Medication dosages and frequencies were confirmed to be accurate.      Allergies:  has No Known Allergies.    Social History:   Social History     Socioeconomic History   • Marital status:      Spouse name: Not on file   • Number of children: Not on file   • Years of education: Not on file   • Highest education level: Not on file   Tobacco Use   • Smoking status: Never Smoker   • Smokeless tobacco: Never Used   Substance and Sexual Activity   • Alcohol use: No   • Drug use: No   • Sexual activity: Defer     Partners: Male     Birth control/protection: Post-menopausal       Family History:    Family History   Problem Relation Age of Onset   • Breast cancer Mother 78   • Ovarian cancer Neg Hx        Health Maintenance:    Health Maintenance   Topic Date Due   • TDAP/TD VACCINES (1 - Tdap) 10/03/1953   • ZOSTER VACCINE (1 of 2) 10/03/1992   • PNEUMOCOCCAL VACCINES (65+ LOW/MEDIUM RISK) (1 of 2 - PCV13) 10/03/2007   • MEDICARE ANNUAL WELLNESS  04/10/2017   • DXA SCAN  05/23/2019   • LIPID PANEL  09/26/2019   • MAMMOGRAM  05/15/2021   • COLONOSCOPY  04/16/2029   • INFLUENZA VACCINE  Completed         Review of Systems   Constitutional: Positive for fatigue. Negative for unexpected weight change.   HENT: Negative for ear pain and rhinorrhea.    Eyes: Positive for visual disturbance. Negative for pain and redness.   Respiratory: Negative for cough and shortness of breath.    Cardiovascular: Negative for chest pain and leg swelling.    Gastrointestinal: Negative for abdominal pain, constipation and diarrhea.   Endocrine: Negative for polydipsia and polyphagia.   Genitourinary: Negative for flank pain, pelvic pain and vaginal bleeding.   Musculoskeletal: Positive for arthralgias. Negative for myalgias.   Skin: Negative for pallor and rash.   Allergic/Immunologic: Negative for food allergies and immunocompromised state.   Neurological: Positive for weakness. Negative for syncope and light-headedness.   Hematological: Negative for adenopathy. Does not bruise/bleed easily.   Psychiatric/Behavioral: Negative for dysphoric mood and suicidal ideas.       Physical Exam    Vitals:    12/18/19 0932   BP: 129/71   Pulse: 113   Resp: 12   Temp: 98 °F (36.7 °C)   TempSrc: Temporal   SpO2: 98%   Weight: 52.6 kg (116 lb)   PainSc: 0-No pain     Body mass index is 20.55 kg/m².    Wt Readings from Last 3 Encounters:   12/18/19 52.6 kg (116 lb)   12/03/19 53.5 kg (118 lb)   11/27/19 54.1 kg (119 lb 3.2 oz)         GENERAL: Alert, thin appearing female appearing her stated age who is in no apparent distress.   HEENT: Sclera anicteric. Head normocephalic, atraumatic. Mucus membranes moist.  Alopecia.  NECK: Trachea midline, supple, without masses.  No thyromegaly.   BREASTS: Deferred  CARDIOVASCULAR: Normal rate, regular rhythm, no murmurs, rubs, or gallops.  No peripheral edema.  RESPIRATORY: Clear to auscultation bilaterally, normal respiratory effort  BACK:  No CVA tenderness, no vertebral tenderness on palpation  GASTROINTESTINAL:  Abdomen is soft, non-tender, non-distended, no rebound or guarding, no masses, or hernias. No HSM.    SKIN:  Warm, dry, well-perfused.  All visible areas intact.  No rashes, lesions, ulcers.  PSYCHIATRIC: AO x3, with appropriate affect, normal thought processes.  NEUROLOGIC: No focal deficits. Moves extremities well.  MUSCULOSKELETAL: Normal gait and station.   EXTREMITIES:   No cyanosis, clubbing, symmetric.  Ecchymosis left upper  extremity consistent with prior IV.  Focal (less than 1 cm) firm area consistent with superficial thrombophlebitis.    LYMPHATICS:  No cervical or inguinal adenopathy noted.     PELVIC exam:    GYNECOLOGIC:  Deferred     ECOG PS 0    PROCEDURES:  none    Diagnostic Data:      Lab Results   Component Value Date    WBC 7.10 12/18/2019    HGB 13.2 12/18/2019    HCT 40.6 12/18/2019    MCV 93.1 12/18/2019     12/18/2019    NEUTROABS 5.40 12/18/2019    GLUCOSE 106 (H) 12/18/2019    BUN 20 12/18/2019    CREATININE 0.80 12/18/2019    EGFRIFNONA 63 12/18/2019     12/18/2019    K 4.2 12/18/2019     12/18/2019    CO2 24.0 12/18/2019    MG 1.8 12/03/2019    CALCIUM 9.7 12/18/2019    ALBUMIN 4.30 12/18/2019    AST 27 12/18/2019    ALT 21 12/18/2019    BILITOT 0.3 12/18/2019     Lab Results   Component Value Date     9.9 11/27/2019     36.6 09/15/2019           Assessment/Plan   This is a 79 y.o. woman who presents for toxicity evaluation while on chemotherapy for carcinosarcoma of the uterus.    Uterine carcinosarcoma  - Cycle #3 of 6 planned due today pending laboratory evaluation.  Patient tolerating treatment well.  Dose modification today secondary to neutropenia.   - Discussed considering PICC placement if peripheral IV access becomes difficult.  Patient to place heat on left upper extremity due to superficial thrombophlebitis.     Chemotherapy induced neutropenia   - Neulasta OBI with this cycle     Neuropathy  - Weakness, poor balance   - Offered patient physical therapy referral. She declined today but reports she would consider for the future.     Pain assessment was performed today as a part of patient’s care.  For patients with pain related to surgery, gynecologic malignancy or cancer treatment, the plan is as noted in the assessment/plan.  For patients with pain not related to these issues, they are to seek any further needed care from a more appropriate provider, such as PCP.    Orders  Placed This Encounter   Procedures   •      Standing Status:   Future     Number of Occurrences:   1     Standing Expiration Date:   12/17/2020   • Comprehensive metabolic panel     Standing Status:   Future     Number of Occurrences:   1     Standing Expiration Date:   12/17/2020   • CBC and Differential     Standing Status:   Future     Number of Occurrences:   1     Standing Expiration Date:   12/17/2020     Order Specific Question:   Manual Differential     Answer:   No   • CBC & Differential     Standing Status:   Future     Standing Expiration Date:   12/28/2020     Order Specific Question:   Manual Differential     Answer:   No       FOLLOW UP: Consideration of cycle # 4    Patient was seen and examined with Dr. Bustamante,  resident, who performed portions of the examination and documentation for this patient's care under my direct supervision.  I agree with the above documentation and plan.    Dasha Claudio MD  12/18/19  1:04 PM

## 2019-12-19 NOTE — RADIATION COMPLETION NOTES
DATE OF COMPLETION: 12/5/2019  DIAGNOSIS: Uterine Cancer  Cancer Staging  FIGO Stage I (cT1, cN0, cM0)    REFERRING: Dasha Claudio MD        Yin Gupta completed radiation therapy today.      BACKGROUND: Yin Siddiqui is a 79 year old female with a high grade carcinosarcoma of the uterus.  She underwent a hysterectomy and staging.  Due to multiple high risk features, she was recommended to undergo a course of adjuvant chemotherapy.  She received adjuvant vaginal cuff brachytherapy interdigitated between her first 3 cycles of chemotherapy as detailed below:    Treatment Summary     Dates of Therapy: 11/19/2019 - 12/5/2019  Treatment Site: Vaginal cuff  Dose: 30 Gy in 5 fractions of 6 Gy each  Technique: Treatments were delivered using the single channel vaginal cylinder with iridium-192 sources and treatments were planned to deliver the dose to the vaginal surface with an active length of 3cm.    Treatment Course and Tolerance: She tolerated vaginal brachytherapy very well and did not develop any acute radiation related toxicities.    The initial follow up visit will be in 1 month.    Yin Siddiqui knows to call if any problems or concerns develop in the meantime.     Electronically signed by: Valdez Yun MD                    Cc: Manoj Brown MD

## 2019-12-19 NOTE — PROGRESS NOTES
Onc Nutrition    Patient: Yin Siddiqui  YOB: 1940    Weight:  116#  Nutrition Symptoms: fatigue    Met with patient and her  during her infusion appointment.  She states her appetite has been fairly good overall.  She reports she has been drinking Boost Plus and [place] Instant Breakfast as needed to aid with intake.  Her biggest nutritional complaint is fatigue.    Encouraged her to continue to increase her oral intake and to drink nutritional supplements as needed.  Advised her to partake in daily physical activity as tolerated to help fight fatigue and build stamina.  Provided coupons for Boost and [place] Instant Breakfast as well as a written recipe guide.    Answered their questions and both voiced understanding of information discussed.  Encouraged to call RD with questions.  Will monitor as needed during her treatment course.    Yenni Abbasi RD  12/19/19

## 2019-12-20 ENCOUNTER — INFUSION (OUTPATIENT)
Dept: ONCOLOGY | Facility: HOSPITAL | Age: 79
End: 2019-12-20

## 2019-12-20 VITALS
HEART RATE: 78 BPM | TEMPERATURE: 98.1 F | RESPIRATION RATE: 14 BRPM | SYSTOLIC BLOOD PRESSURE: 126 MMHG | DIASTOLIC BLOOD PRESSURE: 67 MMHG

## 2019-12-20 DIAGNOSIS — E86.0 DEHYDRATION: Primary | ICD-10-CM

## 2019-12-20 DIAGNOSIS — C54.1 ENDOMETRIAL CANCER (HCC): ICD-10-CM

## 2019-12-20 PROCEDURE — 96360 HYDRATION IV INFUSION INIT: CPT

## 2019-12-20 RX ADMIN — SODIUM CHLORIDE 1000 ML: 9 INJECTION, SOLUTION INTRAVENOUS at 13:46

## 2019-12-23 ENCOUNTER — INFUSION (OUTPATIENT)
Dept: ONCOLOGY | Facility: HOSPITAL | Age: 79
End: 2019-12-23

## 2019-12-23 VITALS
RESPIRATION RATE: 15 BRPM | SYSTOLIC BLOOD PRESSURE: 126 MMHG | TEMPERATURE: 97.8 F | DIASTOLIC BLOOD PRESSURE: 71 MMHG | HEART RATE: 76 BPM

## 2019-12-23 DIAGNOSIS — Z79.899 ON ANTINEOPLASTIC CHEMOTHERAPY: ICD-10-CM

## 2019-12-23 DIAGNOSIS — E86.0 DEHYDRATION: ICD-10-CM

## 2019-12-23 DIAGNOSIS — C54.1 ENDOMETRIAL CANCER (HCC): Primary | ICD-10-CM

## 2019-12-23 PROCEDURE — 96360 HYDRATION IV INFUSION INIT: CPT

## 2019-12-23 RX ADMIN — SODIUM CHLORIDE 1000 ML: 9 INJECTION, SOLUTION INTRAVENOUS at 13:50

## 2019-12-24 ENCOUNTER — APPOINTMENT (OUTPATIENT)
Dept: ONCOLOGY | Facility: HOSPITAL | Age: 79
End: 2019-12-24

## 2020-01-02 ENCOUNTER — INFUSION (OUTPATIENT)
Dept: ONCOLOGY | Facility: HOSPITAL | Age: 80
End: 2020-01-02

## 2020-01-02 VITALS
DIASTOLIC BLOOD PRESSURE: 66 MMHG | SYSTOLIC BLOOD PRESSURE: 117 MMHG | RESPIRATION RATE: 15 BRPM | TEMPERATURE: 97.3 F | HEART RATE: 75 BPM

## 2020-01-02 DIAGNOSIS — C54.1 ENDOMETRIAL CANCER (HCC): Primary | ICD-10-CM

## 2020-01-02 DIAGNOSIS — E86.0 DEHYDRATION: ICD-10-CM

## 2020-01-02 LAB
BASOPHILS # BLD AUTO: 0.02 10*3/MM3 (ref 0–0.2)
BASOPHILS NFR BLD AUTO: 0.3 % (ref 0–1.5)
DEPRECATED RDW RBC AUTO: 58.3 FL (ref 37–54)
EOSINOPHIL # BLD AUTO: 0.07 10*3/MM3 (ref 0–0.4)
EOSINOPHIL NFR BLD AUTO: 1 % (ref 0.3–6.2)
ERYTHROCYTE [DISTWIDTH] IN BLOOD BY AUTOMATED COUNT: 17.9 % (ref 12.3–15.4)
HCT VFR BLD AUTO: 37.4 % (ref 34–46.6)
HGB BLD-MCNC: 12 G/DL (ref 12–15.9)
IMM GRANULOCYTES # BLD AUTO: 0.06 10*3/MM3 (ref 0–0.05)
IMM GRANULOCYTES NFR BLD AUTO: 0.8 % (ref 0–0.5)
LYMPHOCYTES # BLD AUTO: 1.4 10*3/MM3 (ref 0.7–3.1)
LYMPHOCYTES NFR BLD AUTO: 19.2 % (ref 19.6–45.3)
MCH RBC QN AUTO: 29.6 PG (ref 26.6–33)
MCHC RBC AUTO-ENTMCNC: 32.1 G/DL (ref 31.5–35.7)
MCV RBC AUTO: 92.3 FL (ref 79–97)
MONOCYTES # BLD AUTO: 0.79 10*3/MM3 (ref 0.1–0.9)
MONOCYTES NFR BLD AUTO: 10.8 % (ref 5–12)
NEUTROPHILS # BLD AUTO: 4.97 10*3/MM3 (ref 1.7–7)
NEUTROPHILS NFR BLD AUTO: 67.9 % (ref 42.7–76)
NRBC BLD AUTO-RTO: 0 /100 WBC (ref 0–0.2)
PLATELET # BLD AUTO: 230 10*3/MM3 (ref 140–450)
PMV BLD AUTO: 10.5 FL (ref 6–12)
RBC # BLD AUTO: 4.05 10*6/MM3 (ref 3.77–5.28)
WBC NRBC COR # BLD: 7.31 10*3/MM3 (ref 3.4–10.8)

## 2020-01-02 PROCEDURE — 85025 COMPLETE CBC W/AUTO DIFF WBC: CPT | Performed by: OBSTETRICS & GYNECOLOGY

## 2020-01-02 PROCEDURE — 96360 HYDRATION IV INFUSION INIT: CPT

## 2020-01-02 RX ADMIN — SODIUM CHLORIDE 1000 ML: 9 INJECTION, SOLUTION INTRAVENOUS at 11:10

## 2020-01-07 ENCOUNTER — HOSPITAL ENCOUNTER (OUTPATIENT)
Dept: INFUSION THERAPY | Facility: HOSPITAL | Age: 80
Discharge: HOME | End: 2020-01-07
Admitting: OBSTETRICS & GYNECOLOGY

## 2020-01-07 VITALS
RESPIRATION RATE: 16 BRPM | WEIGHT: 119 LBS | TEMPERATURE: 98.3 F | SYSTOLIC BLOOD PRESSURE: 149 MMHG | BODY MASS INDEX: 21.08 KG/M2 | HEART RATE: 79 BPM | DIASTOLIC BLOOD PRESSURE: 74 MMHG

## 2020-01-07 DIAGNOSIS — C54.1 ENDOMETRIAL CANCER (HCC): ICD-10-CM

## 2020-01-07 DIAGNOSIS — Z79.899 ON ANTINEOPLASTIC CHEMOTHERAPY: ICD-10-CM

## 2020-01-07 PROCEDURE — C1894 INTRO/SHEATH, NON-LASER: HCPCS

## 2020-01-07 PROCEDURE — C1751 CATH, INF, PER/CENT/MIDLINE: HCPCS

## 2020-01-07 RX ORDER — SODIUM CHLORIDE 0.9 % (FLUSH) 0.9 %
10 SYRINGE (ML) INJECTION AS NEEDED
Status: DISCONTINUED | OUTPATIENT
Start: 2020-01-07 | End: 2020-01-09 | Stop reason: HOSPADM

## 2020-01-07 RX ORDER — HEPARIN SODIUM,PORCINE 10 UNIT/ML
10 VIAL (ML) INTRAVENOUS EVERY 12 HOURS
Status: DISCONTINUED | OUTPATIENT
Start: 2020-01-07 | End: 2020-01-09 | Stop reason: HOSPADM

## 2020-01-07 RX ORDER — SODIUM CHLORIDE 0.9 % (FLUSH) 0.9 %
10 SYRINGE (ML) INJECTION EVERY 12 HOURS SCHEDULED
Status: DISCONTINUED | OUTPATIENT
Start: 2020-01-07 | End: 2020-01-09 | Stop reason: HOSPADM

## 2020-01-07 NOTE — ADDENDUM NOTE
Encounter addended by: Shagufta Johansen RN on: 1/7/2020 2:42 PM   Actions taken: Flowsheet accepted

## 2020-01-08 ENCOUNTER — HOSPITAL ENCOUNTER (OUTPATIENT)
Dept: ONCOLOGY | Facility: HOSPITAL | Age: 80
Setting detail: INFUSION SERIES
Discharge: HOME | End: 2020-01-08

## 2020-01-08 ENCOUNTER — OFFICE VISIT (OUTPATIENT)
Dept: RADIATION ONCOLOGY | Facility: HOSPITAL | Age: 80
End: 2020-01-08

## 2020-01-08 ENCOUNTER — HOSPITAL ENCOUNTER (OUTPATIENT)
Dept: RADIATION ONCOLOGY | Facility: HOSPITAL | Age: 80
Setting detail: RADIATION/ONCOLOGY SERIES
Discharge: HOME | End: 2020-01-08

## 2020-01-08 ENCOUNTER — OFFICE VISIT (OUTPATIENT)
Dept: GYNECOLOGIC ONCOLOGY | Facility: CLINIC | Age: 80
End: 2020-01-08

## 2020-01-08 VITALS
TEMPERATURE: 97.6 F | HEART RATE: 105 BPM | DIASTOLIC BLOOD PRESSURE: 66 MMHG | HEIGHT: 63 IN | BODY MASS INDEX: 21.25 KG/M2 | SYSTOLIC BLOOD PRESSURE: 132 MMHG | RESPIRATION RATE: 16 BRPM | WEIGHT: 119.9 LBS | OXYGEN SATURATION: 96 %

## 2020-01-08 VITALS
TEMPERATURE: 97.6 F | BODY MASS INDEX: 21.25 KG/M2 | RESPIRATION RATE: 16 BRPM | DIASTOLIC BLOOD PRESSURE: 66 MMHG | SYSTOLIC BLOOD PRESSURE: 132 MMHG | HEIGHT: 63 IN | OXYGEN SATURATION: 96 % | HEART RATE: 105 BPM | WEIGHT: 119.9 LBS

## 2020-01-08 VITALS — DIASTOLIC BLOOD PRESSURE: 60 MMHG | HEART RATE: 69 BPM | SYSTOLIC BLOOD PRESSURE: 141 MMHG

## 2020-01-08 DIAGNOSIS — E86.0 DEHYDRATION: ICD-10-CM

## 2020-01-08 DIAGNOSIS — C54.1 ENDOMETRIAL CANCER (HCC): ICD-10-CM

## 2020-01-08 DIAGNOSIS — C54.1 ENDOMETRIAL CANCER (HCC): Primary | ICD-10-CM

## 2020-01-08 DIAGNOSIS — D70.1 CHEMOTHERAPY-INDUCED NEUTROPENIA (HCC): ICD-10-CM

## 2020-01-08 DIAGNOSIS — T45.1X5A CHEMOTHERAPY-INDUCED NEUTROPENIA (HCC): ICD-10-CM

## 2020-01-08 LAB
ALBUMIN SERPL-MCNC: 4.3 G/DL (ref 3.5–5.2)
ALBUMIN/GLOB SERPL: 1.8 G/DL
ALP SERPL-CCNC: 105 U/L (ref 39–117)
ALT SERPL W P-5'-P-CCNC: 15 U/L (ref 1–33)
ANION GAP SERPL CALCULATED.3IONS-SCNC: 13 MMOL/L (ref 5–15)
AST SERPL-CCNC: 25 U/L (ref 1–32)
BILIRUB SERPL-MCNC: 0.3 MG/DL (ref 0.2–1.2)
BUN BLD-MCNC: 17 MG/DL (ref 8–23)
BUN/CREAT SERPL: 23 (ref 7–25)
CALCIUM SPEC-SCNC: 9.7 MG/DL (ref 8.6–10.5)
CANCER AG125 SERPL QL: 8.9 U/ML (ref 0–38.1)
CHLORIDE SERPL-SCNC: 102 MMOL/L (ref 98–107)
CO2 SERPL-SCNC: 25 MMOL/L (ref 22–29)
CREAT BLD-MCNC: 0.74 MG/DL (ref 0.57–1)
CREAT BLDA-MCNC: 0.7 MG/DL
CREAT BLDA-MCNC: 0.7 MG/DL (ref 0.6–1.3)
ERYTHROCYTE [DISTWIDTH] IN BLOOD BY AUTOMATED COUNT: 21.2 % (ref 12.3–15.4)
GFR SERPL CREATININE-BSD FRML MDRD: 76 ML/MIN/1.73
GLOBULIN UR ELPH-MCNC: 2.4 GM/DL
GLUCOSE BLD-MCNC: 134 MG/DL (ref 65–99)
HCT VFR BLD AUTO: 36.4 % (ref 34–46.6)
HGB BLD-MCNC: 12 G/DL (ref 12–15.9)
LYMPHOCYTES # BLD AUTO: 1.1 10*3/MM3 (ref 0.7–3.1)
LYMPHOCYTES NFR BLD AUTO: 13.9 % (ref 19.6–45.3)
MCH RBC QN AUTO: 31 PG (ref 26.6–33)
MCHC RBC AUTO-ENTMCNC: 33 G/DL (ref 31.5–35.7)
MCV RBC AUTO: 94.1 FL (ref 79–97)
MONOCYTES # BLD AUTO: 0.6 10*3/MM3 (ref 0.1–0.9)
MONOCYTES NFR BLD AUTO: 7.7 % (ref 5–12)
NEUTROPHILS # BLD AUTO: 6.4 10*3/MM3 (ref 1.7–7)
NEUTROPHILS NFR BLD AUTO: 78.4 % (ref 42.7–76)
PLATELET # BLD AUTO: 257 10*3/MM3 (ref 140–450)
PMV BLD AUTO: 6.9 FL (ref 6–12)
POTASSIUM BLD-SCNC: 4.3 MMOL/L (ref 3.5–5.2)
PROT SERPL-MCNC: 6.7 G/DL (ref 6–8.5)
RBC # BLD AUTO: 3.87 10*6/MM3 (ref 3.77–5.28)
SODIUM BLD-SCNC: 140 MMOL/L (ref 136–145)
WBC NRBC COR # BLD: 8.1 10*3/MM3 (ref 3.4–10.8)

## 2020-01-08 PROCEDURE — 25010000002 DIPHENHYDRAMINE PER 50 MG: Performed by: OBSTETRICS & GYNECOLOGY

## 2020-01-08 PROCEDURE — 96360 HYDRATION IV INFUSION INIT: CPT

## 2020-01-08 PROCEDURE — 36592 COLLECT BLOOD FROM PICC: CPT

## 2020-01-08 PROCEDURE — 99214 OFFICE O/P EST MOD 30 MIN: CPT | Performed by: OBSTETRICS & GYNECOLOGY

## 2020-01-08 PROCEDURE — 80053 COMPREHEN METABOLIC PANEL: CPT | Performed by: OBSTETRICS & GYNECOLOGY

## 2020-01-08 PROCEDURE — 96367 TX/PROPH/DG ADDL SEQ IV INF: CPT

## 2020-01-08 PROCEDURE — 96377 APPLICATON ON-BODY INJECTOR: CPT

## 2020-01-08 PROCEDURE — 96417 CHEMO IV INFUS EACH ADDL SEQ: CPT

## 2020-01-08 PROCEDURE — 96375 TX/PRO/DX INJ NEW DRUG ADDON: CPT

## 2020-01-08 PROCEDURE — 96361 HYDRATE IV INFUSION ADD-ON: CPT

## 2020-01-08 PROCEDURE — 96374 THER/PROPH/DIAG INJ IV PUSH: CPT

## 2020-01-08 PROCEDURE — 25010000002 PEGFILGRASTIM 6 MG/0.6ML PREFILLED SYRINGE KIT: Performed by: OBSTETRICS & GYNECOLOGY

## 2020-01-08 PROCEDURE — 25010000002 PACLITAXEL PER 30 MG: Performed by: OBSTETRICS & GYNECOLOGY

## 2020-01-08 PROCEDURE — 25010000002 DEXAMETHASONE PER 1 MG: Performed by: OBSTETRICS & GYNECOLOGY

## 2020-01-08 PROCEDURE — 86304 IMMUNOASSAY TUMOR CA 125: CPT | Performed by: OBSTETRICS & GYNECOLOGY

## 2020-01-08 PROCEDURE — 25010000002 CARBOPLATIN PER 50 MG: Performed by: OBSTETRICS & GYNECOLOGY

## 2020-01-08 PROCEDURE — G0463 HOSPITAL OUTPT CLINIC VISIT: HCPCS

## 2020-01-08 PROCEDURE — 25010000002 PALONOSETRON 0.25 MG/5ML SOLUTION PREFILLED SYRINGE: Performed by: OBSTETRICS & GYNECOLOGY

## 2020-01-08 PROCEDURE — 25010000002 FOSAPREPITANT PER 1 MG: Performed by: OBSTETRICS & GYNECOLOGY

## 2020-01-08 PROCEDURE — 85025 COMPLETE CBC W/AUTO DIFF WBC: CPT | Performed by: OBSTETRICS & GYNECOLOGY

## 2020-01-08 PROCEDURE — 82565 ASSAY OF CREATININE: CPT

## 2020-01-08 PROCEDURE — 96415 CHEMO IV INFUSION ADDL HR: CPT

## 2020-01-08 PROCEDURE — 96413 CHEMO IV INFUSION 1 HR: CPT

## 2020-01-08 RX ORDER — DIPHENHYDRAMINE HYDROCHLORIDE 50 MG/ML
50 INJECTION INTRAMUSCULAR; INTRAVENOUS AS NEEDED
Status: DISCONTINUED | OUTPATIENT
Start: 2020-01-08 | End: 2020-01-09 | Stop reason: HOSPADM

## 2020-01-08 RX ORDER — SODIUM CHLORIDE 9 MG/ML
250 INJECTION, SOLUTION INTRAVENOUS ONCE
Status: COMPLETED | OUTPATIENT
Start: 2020-01-08 | End: 2020-01-08

## 2020-01-08 RX ORDER — FAMOTIDINE 10 MG/ML
20 INJECTION, SOLUTION INTRAVENOUS ONCE
Status: COMPLETED | OUTPATIENT
Start: 2020-01-08 | End: 2020-01-08

## 2020-01-08 RX ORDER — DIPHENHYDRAMINE HYDROCHLORIDE 50 MG/ML
50 INJECTION INTRAMUSCULAR; INTRAVENOUS AS NEEDED
Status: CANCELLED | OUTPATIENT
Start: 2020-01-08

## 2020-01-08 RX ORDER — FAMOTIDINE 10 MG/ML
20 INJECTION, SOLUTION INTRAVENOUS ONCE
Status: CANCELLED | OUTPATIENT
Start: 2020-01-08

## 2020-01-08 RX ORDER — PALONOSETRON 0.05 MG/ML
0.25 INJECTION, SOLUTION INTRAVENOUS ONCE
Status: COMPLETED | OUTPATIENT
Start: 2020-01-08 | End: 2020-01-08

## 2020-01-08 RX ORDER — FAMOTIDINE 10 MG/ML
20 INJECTION, SOLUTION INTRAVENOUS AS NEEDED
Status: DISCONTINUED | OUTPATIENT
Start: 2020-01-08 | End: 2020-01-09 | Stop reason: HOSPADM

## 2020-01-08 RX ORDER — PALONOSETRON 0.05 MG/ML
0.25 INJECTION, SOLUTION INTRAVENOUS ONCE
Status: CANCELLED | OUTPATIENT
Start: 2020-01-08

## 2020-01-08 RX ORDER — FAMOTIDINE 10 MG/ML
20 INJECTION, SOLUTION INTRAVENOUS AS NEEDED
Status: CANCELLED | OUTPATIENT
Start: 2020-01-08

## 2020-01-08 RX ORDER — SODIUM CHLORIDE 9 MG/ML
250 INJECTION, SOLUTION INTRAVENOUS ONCE
Status: CANCELLED | OUTPATIENT
Start: 2020-01-08

## 2020-01-08 RX ADMIN — FAMOTIDINE 20 MG: 10 INJECTION, SOLUTION INTRAVENOUS at 10:58

## 2020-01-08 RX ADMIN — PEGFILGRASTIM 6 MG: KIT SUBCUTANEOUS at 15:48

## 2020-01-08 RX ADMIN — SODIUM CHLORIDE 275 MG: 9 INJECTION, SOLUTION INTRAVENOUS at 12:03

## 2020-01-08 RX ADMIN — SODIUM CHLORIDE 250 ML: 9 INJECTION, SOLUTION INTRAVENOUS at 10:57

## 2020-01-08 RX ADMIN — SODIUM CHLORIDE 150 MG: 9 INJECTION, SOLUTION INTRAVENOUS at 11:32

## 2020-01-08 RX ADMIN — DEXAMETHASONE SODIUM PHOSPHATE 20 MG: 10 INJECTION INTRAMUSCULAR; INTRAVENOUS at 11:00

## 2020-01-08 RX ADMIN — PALONOSETRON 0.25 MG: 0.25 INJECTION, SOLUTION INTRAVENOUS at 11:29

## 2020-01-08 RX ADMIN — CARBOPLATIN 500 MG: 10 INJECTION, SOLUTION INTRAVENOUS at 15:06

## 2020-01-08 RX ADMIN — DIPHENHYDRAMINE HYDROCHLORIDE 50 MG: 50 INJECTION INTRAMUSCULAR; INTRAVENOUS at 11:03

## 2020-01-08 NOTE — PROGRESS NOTES
FOLLOW UP NOTE    PATIENT:                                                      Yin Siddiqui  MEDICAL RECORD #:                        [id number]  :                                                          1940  COMPLETION DATE:   2019  DIAGNOSIS:     Endometrial cancer (CMS/HCC)  - FIGO Stage I (cT1, cN0, cM0)      BRIEF HISTORY:    Ms. Siddiqui is a very pleasant 79-year-old female presenting today for initial follow-up.  She has a history of hysterectomy with staging and bilateral salpingo-oophorectomy by Dr. 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  Due to multiple high risk features, she was recommended to undergo a course of adjuvant systemic treatment with carboplatin and paclitaxel.  She received adjuvant vaginal cuff brachytherapy interdigitated between her first 3 cycles of chemotherapy.  The vaginal cuff received a dose of 30 Gray in 5 fractions, which she tolerated well.  She is longterm done with chemotherapy and anticipates cycle #4 of 6 today following an appointment with Dr. Claudio.  She denies any vaginal bleeding or discharge, and specifically denies any bowel or bladder complaints.  She denies pain.  She states that her energy levels have improved slightly since completing vaginal brachytherapy, though she does tire easily and has required hydration via IV infusions which have helped.      MEDICATIONS: Medication reconciliation for the patient was reviewed and confirmed in the electronic medical record.    Review of Systems   Constitutional: Positive for fatigue.   Musculoskeletal: Positive for arthralgias.        Generalized weakness   All other systems reviewed and are negative.      KPS 80%    Physical Exam   Constitutional: She is oriented to person, place, and time. She appears well-developed and well-nourished. No distress.   HENT:   Head: Normocephalic and atraumatic.  "  Mouth/Throat: Oropharynx is clear and moist.   Eyes: Pupils are equal, round, and reactive to light. Conjunctivae are normal.   Neck: Normal range of motion. Neck supple.   Cardiovascular: Normal rate and regular rhythm. Exam reveals no friction rub.   No murmur heard.  Pulmonary/Chest: Effort normal and breath sounds normal. She has no wheezes.   Abdominal: Soft. Bowel sounds are normal. She exhibits no distension and no mass. There is no tenderness.   Genitourinary:   Genitourinary Comments: No visible tumor on vaginal exam.  The vagina is narrow.  Well-healed vaginal cuff without any abnormal nodularity or masses.  No inguinal adenopathy bilaterally.     Musculoskeletal: Normal range of motion. She exhibits no edema.   Lymphadenopathy:     She has no cervical adenopathy.        Right: No supraclavicular adenopathy present.        Left: No supraclavicular adenopathy present.   Neurological: She is alert and oriented to person, place, and time.   Skin: Skin is warm and dry.   Alopecia.   Psychiatric: She has a normal mood and affect. Her behavior is normal. Judgment and thought content normal.   Nursing note and vitals reviewed.      VITAL SIGNS:   Vitals:    01/08/20 0827   BP: 132/66   Pulse: 105   Resp: 16   Temp: 97.6 °F (36.4 °C)   TempSrc: Temporal   SpO2: 96%  Comment: RA   Weight: 54.4 kg (119 lb 14.4 oz)   Height: 160 cm (62.99\")   PainSc: 0-No pain       The following portions of the patient's history were reviewed and updated as appropriate: allergies, current medications, past family history, past medical history, past social history, past surgical history and problem list.       Yin was seen today for endometrial cancer.    Diagnoses and all orders for this visit:    Endometrial cancer (CMS/[facility])      IMPRESSION: Ms. Siddiqui is a 79-year-old female who returns today for initial follow-up.  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  She completed radiation approximately 1 month ago and did not develop any acute radiation-related toxicities.  There was no evidence of disease on today's pelvic and vaginal exam.  From a symptomatic standpoint, she has no bowel, bladder, or vaginal complaints.  She was given a size small vaginal dilator and printed instructions for use, and we discussed this at length.  Given her history of high risk features, I would anticipate close surveillance and serial imaging.  She is scheduled later this morning for a follow-up visit with Dr. Claudio, and anticipates chemotherapy cycle #4 of 6 later today.      RECOMMENDATIONS: Ms. Siddiqui plans to continue treatment and oncologic surveillance with Dr. Claudio.  We would be happy to follow Ms. Siddiqui on an as-needed basis for any questions or concerns.    Return if symptoms worsen or fail to improve, for Office Visit.    Geovanny Mart, APRN

## 2020-01-08 NOTE — PROGRESS NOTES
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 year old female who presents today for treatment and evaluation of the above issues.    Previous visit:   Patient reports she is overall felling okay. She reports she does have days with low energy. She generally takes a nap daily. She denies numbness and tingling. However, she does report she feels weak and has poor balance. Over the past week, she has been eating well. On days when she cannot eat well, she is able to drink.   She is concerned about her IV access. She reports she is bruising at the site of the IV as well as a bump on her wrist.     Today, she reports she had pain in her hands, knees, and toes that resolved with IV fluids after last cycle.  She is planning on receiving as needed IV fluids 3-4 times with the cycle as well in order to alleviate the symptoms.  She notes possible neuropathy for about 10 days after treatment that is completely resolved.  She notes normal bowel bladder function.  Her weight is stable.  She is taking boost and [place] instant breakfast for nutritional supplementation.  Dietitians introduce themselves at the infusion center and made themselves available for questions.  Patient complains of fatigue but walks about a mile a day as long as the weather permits.  She is accompanied by her  as usual.    Oncologic History:     Endometrial cancer (CMS/HCC)    9/18/2019 Initial Diagnosis     Endometrial biopsy by Dr. Lam due to recurrent postmenopausal bleeding. Pathology showed cytologic atypia in background of extensive tumor necrosis.  =36.6. Referred to Gyn Oncology      9/18/2019 Imaging     CT abd/pelvis IMPRESSION:  1. Approximately 7 cm central uterine mass, strongly vascular along its  posterior lateral right margin at approximately  7:00 as noted above. No  evidence of invasion into the surrounding soft tissues.  2. No evidence of adenopathy or other metastatic disease.  3. 2 cm nonenhancing water density right ovarian cyst.  4. Incidentally noted small hepatic cyst and normal variant bilateral  renal parapelvic cysts.  Pre-op CXR ARIS      9/27/2019 Surgery     Exploratory laparotomy, Type 1 radical hysterectomy, BSO, pelvic lymph node dissection, omentectomy, cystoscopy with bilateral temporary ureteral stents.     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 Sarcomatous component is undifferentiated. Cytology negative, no LVSI, nodes negative. Stage IA         Chemotherapy     OP UTERINE PACLitaxel / CARBOplatin (Q21D)        11/19/2019 - 12/5/2019 Radiation     Radiation OncologyTreatment Course:  Yin Siddiqui received 3000 cGy in 5 fractions to vagina via High Dose Radiation - HDR.      12/11/2019 -  Chemotherapy     OP FILGRASTIM (NEUPOGEN)           Past Medical History:   Diagnosis Date   • Anemia    • Degenerative disc disease, lumbar     hands and knees    • Dyslipidemia    • Endometrial cancer (CMS/HCC) 9/27/2019   • GERD (gastroesophageal reflux disease)    • Hyperlipidemia    • Menopause    • OAB (overactive bladder)    • Osteopenia    • PMB (postmenopausal bleeding)    • Skin cancer    • Urge incontinence    • Vaginal atrophy    • Wears dentures     upper   • Wears reading eyeglasses        Past Surgical History:   Procedure Laterality Date   • CATARACT EXTRACTION W/ INTRAOCULAR LENS  IMPLANT, BILATERAL     • COLONOSCOPY  2019   • CYST REMOVAL      left wrist   • CYSTOSCOPY W/ URETERAL STENT PLACEMENT Bilateral 9/27/2019    Procedure: CYSTOSCOPY WITH BILATERAL TEMPORARY URETERAL STENT INSERTION;  Surgeon: Dasha Claudio MD;  Location: [facility]  OR;  Service: Gynecology   • D&C HYSTEROSCOPY     • EXPLORATORY LAPAROTOMY, TOTAL ABDOMINAL HYSTERECTOMY, SALPINGO OOPHORECTOMY WITH TUMOR DEBULKING Bilateral 9/27/2019    Procedure: EXPLORATORY LAPAROTOMY TYPE 1, RADICAL TOTAL ABDOMINAL HYSTERECTOMY, BILATERAL SALPINGO OOPHORECTOMY, PELVIC LYMPH NODE DISSECTION, OMENTUMECTOMY;  Surgeon: Dasha Claudio MD;  [facility]: [facility];  Service: Gynecology   • EYE SURGERY Left 09/05/2019    macular hole    • EYE SURGERY      as a child for muscular problems  x 3   • HAND SURGERY Right     bone removed    • TONSILLECTOMY         MEDICATIONS: The current medication list was reviewed with the patient and updated in the EMR this date per the Medical Assistant. Medication dosages and frequencies were confirmed to be accurate.      Allergies:  has No Known Allergies.    Social History:   Social History     Socioeconomic History   • Marital status:      Spouse name: Not on file   • Number of children: Not on file   • Years of education: Not on file   • Highest education level: Not on file   Tobacco Use   • Smoking status: Never Smoker   • Smokeless tobacco: Never Used   Substance and Sexual Activity   • Alcohol use: No   • Drug use: No   • Sexual activity: Defer     Partners: Male     Birth control/protection: Post-menopausal       Family History:    Family History   Problem Relation Age of Onset   • Breast cancer Mother 78   • Ovarian cancer Neg Hx        Health Maintenance:    Health Maintenance   Topic Date Due   • TDAP/TD VACCINES (1 - Tdap) 10/03/1953   • Pneumococcal Vaccine Once at 65 Years Old  10/03/2007   • MEDICARE ANNUAL WELLNESS  04/10/2017   • DXA SCAN  05/23/2019   • ZOSTER VACCINE (2 of 2) 08/12/2019   • LIPID PANEL  09/26/2019   • MAMMOGRAM  05/15/2021   • COLONOSCOPY  04/16/2029   • INFLUENZA VACCINE  Completed         Review of Systems   Constitutional: Positive for fatigue. Negative for unexpected weight change.   HENT: Negative for ear pain and  "rhinorrhea.    Eyes: Positive for visual disturbance. Negative for pain and redness.   Respiratory: Negative for cough and shortness of breath.    Cardiovascular: Negative for chest pain and leg swelling.   Gastrointestinal: Negative for abdominal pain, constipation and diarrhea.   Endocrine: Negative for polydipsia and polyphagia.   Genitourinary: Negative for flank pain, pelvic pain and vaginal bleeding.   Musculoskeletal: Positive for arthralgias. Negative for myalgias.   Skin: Negative for pallor and rash.   Allergic/Immunologic: Negative for food allergies and immunocompromised state.   Neurological: Positive for weakness. Negative for syncope and light-headedness.   Hematological: Negative for adenopathy. Does not bruise/bleed easily.   Psychiatric/Behavioral: Negative for dysphoric mood and suicidal ideas.       Physical Exam    Vitals:    01/08/20 0916   BP: 132/66   Pulse: 105   Resp: 16   Temp: 97.6 °F (36.4 °C)   TempSrc: Temporal   SpO2: 96%   Weight: 54.4 kg (119 lb 14.4 oz)   Height: 160 cm (62.99\")   PainSc: 0-No pain     Body mass index is 21.25 kg/m².    Wt Readings from Last 3 Encounters:   01/08/20 54.4 kg (119 lb 14.4 oz)   01/08/20 54.4 kg (119 lb 14.4 oz)   01/07/20 54 kg (119 lb)         GENERAL: Alert, thin appearing female appearing her stated age who is in no apparent distress.   HEENT: Sclera anicteric. Head normocephalic, atraumatic. Mucus membranes moist.  Alopecia.  NECK: Trachea midline, supple, without masses.  No thyromegaly.   BREASTS: Deferred  CARDIOVASCULAR: Normal rate, regular rhythm, no murmurs, rubs, or gallops.  No peripheral edema.  RESPIRATORY: Clear to auscultation bilaterally, normal respiratory effort  BACK:  No CVA tenderness, no vertebral tenderness on palpation  GASTROINTESTINAL:  Abdomen is soft, non-tender, non-distended, no rebound or guarding, no masses, or hernias. No HSM.    SKIN:  Warm, dry, well-perfused.  All visible areas intact.  No rashes, lesions, " ulcers.  PSYCHIATRIC: AO x3, with appropriate affect, normal thought processes.  NEUROLOGIC: No focal deficits. Moves extremities well.  MUSCULOSKELETAL: Normal gait and station.   EXTREMITIES:   No cyanosis, clubbing, symmetric.  PICC RUE.  LYMPHATICS:  No inguinal adenopathy noted, vague fullness left supraclavicular area otherwise no adenopathy appreciated.     PELVIC exam:    GYNECOLOGIC:  Deferred     ECOG PS 0    PROCEDURES:  none    Diagnostic Data:      Lab Results   Component Value Date    WBC 8.10 01/08/2020    HGB 12.0 01/08/2020    HCT 36.4 01/08/2020    MCV 94.1 01/08/2020     01/08/2020    NEUTROABS 6.40 01/08/2020    GLUCOSE 134 (H) 01/08/2020    BUN 17 01/08/2020    CREATININE 0.70 01/08/2020    EGFRIFNONA 76 01/08/2020     01/08/2020    K 4.3 01/08/2020     01/08/2020    CO2 25.0 01/08/2020    MG 1.8 12/03/2019    CALCIUM 9.7 01/08/2020    ALBUMIN 4.30 01/08/2020    AST 25 01/08/2020    ALT 15 01/08/2020    BILITOT 0.3 01/08/2020     Lab Results   Component Value Date     9.6 12/18/2019     9.9 11/27/2019     36.6 09/15/2019           Assessment/Plan   This is a 79 y.o. woman who presents for toxicity evaluation while on chemotherapy for carcinosarcoma of the uterus.    Uterine carcinosarcoma  - Cycle #4 of 6 planned due today pending laboratory evaluation.  Patient tolerating treatment well.  Dose modification today secondary to neutropenia.   - PICC RUE     Chemotherapy induced neutropenia   - Neulasta OBI, EMLA cream to decrease discomfort with application    Neuropathy  -Improved with IV fluids which is not typical.  However, patient is active and symptoms are manageable.    Dehydration  -IV fluids PRN    Pain assessment was performed today as a part of patient’s care.  For patients with pain related to surgery, gynecologic malignancy or cancer treatment, the plan is as noted in the assessment/plan.  For patients with pain not related to these issues, they are  to seek any further needed care from a more appropriate provider, such as PCP.    Orders Placed This Encounter   Procedures   •      Standing Status:   Future     Number of Occurrences:   1     Standing Expiration Date:   1/7/2021   • Comprehensive metabolic panel     Standing Status:   Future     Number of Occurrences:   1     Standing Expiration Date:   1/7/2021   • CBC and Differential     Standing Status:   Future     Number of Occurrences:   1     Standing Expiration Date:   1/7/2021     Order Specific Question:   Manual Differential     Answer:   No   • CBC & Differential     Standing Status:   Future     Standing Expiration Date:   1/19/2021     Order Specific Question:   Manual Differential     Answer:   No       FOLLOW UP: Consideration of cycle # 5      Patient was seen and examined with Dr. Lu,  resident, who performed portions of the examination and documentation for this patient's care under my direct supervision.  I agree with the above documentation and plan.    Dasha Claudio MD  01/08/20  10:57 AM

## 2020-01-09 ENCOUNTER — TELEPHONE (OUTPATIENT)
Dept: OBSTETRICS AND GYNECOLOGY | Facility: CLINIC | Age: 80
End: 2020-01-09

## 2020-01-09 NOTE — TELEPHONE ENCOUNTER
I have called the patient to ascertain how she is doing?  She has completed her fourth cycle of chemotherapy.  She has had brachytherapy of the vaginal cuff.  She indicates that she had extreme lethargy after the first 2 courses, but since increasing her fluid intake and receiving IV fluids she is feeling much better.  Her energy is improving.  She is having no vaginal bleeding.  She states that her appetite is improving.  She is grateful for her care under the direction of Dr. Claudio.  She has an appointment to see me in July 2020.

## 2020-01-13 ENCOUNTER — INFUSION (OUTPATIENT)
Dept: ONCOLOGY | Facility: HOSPITAL | Age: 80
End: 2020-01-13

## 2020-01-13 VITALS
HEART RATE: 84 BPM | RESPIRATION RATE: 16 BRPM | SYSTOLIC BLOOD PRESSURE: 128 MMHG | TEMPERATURE: 98 F | DIASTOLIC BLOOD PRESSURE: 65 MMHG

## 2020-01-13 DIAGNOSIS — C54.1 ENDOMETRIAL CANCER (HCC): Primary | ICD-10-CM

## 2020-01-13 PROCEDURE — 96360 HYDRATION IV INFUSION INIT: CPT

## 2020-01-13 RX ORDER — SODIUM CHLORIDE 9 MG/ML
999 INJECTION, SOLUTION INTRAVENOUS ONCE
Status: CANCELLED | OUTPATIENT
Start: 2020-01-13

## 2020-01-13 RX ORDER — SODIUM CHLORIDE 9 MG/ML
999 INJECTION, SOLUTION INTRAVENOUS ONCE
Status: COMPLETED | OUTPATIENT
Start: 2020-01-13 | End: 2020-01-13

## 2020-01-13 RX ADMIN — SODIUM CHLORIDE 999 ML: 9 INJECTION, SOLUTION INTRAVENOUS at 09:15

## 2020-01-16 ENCOUNTER — INFUSION (OUTPATIENT)
Dept: ONCOLOGY | Facility: HOSPITAL | Age: 80
End: 2020-01-16

## 2020-01-16 VITALS
DIASTOLIC BLOOD PRESSURE: 53 MMHG | SYSTOLIC BLOOD PRESSURE: 122 MMHG | RESPIRATION RATE: 16 BRPM | TEMPERATURE: 97.4 F | HEART RATE: 79 BPM

## 2020-01-16 DIAGNOSIS — C54.1 ENDOMETRIAL CANCER (HCC): Primary | ICD-10-CM

## 2020-01-16 PROCEDURE — 96360 HYDRATION IV INFUSION INIT: CPT

## 2020-01-16 RX ORDER — SODIUM CHLORIDE 9 MG/ML
999 INJECTION, SOLUTION INTRAVENOUS ONCE
Status: CANCELLED | OUTPATIENT
Start: 2020-01-16

## 2020-01-16 RX ORDER — SODIUM CHLORIDE 9 MG/ML
999 INJECTION, SOLUTION INTRAVENOUS ONCE
Status: COMPLETED | OUTPATIENT
Start: 2020-01-16 | End: 2020-01-16

## 2020-01-16 RX ADMIN — SODIUM CHLORIDE 999 ML: 9 INJECTION, SOLUTION INTRAVENOUS at 10:36

## 2020-01-20 ENCOUNTER — INFUSION (OUTPATIENT)
Dept: ONCOLOGY | Facility: HOSPITAL | Age: 80
End: 2020-01-20

## 2020-01-20 VITALS
RESPIRATION RATE: 16 BRPM | SYSTOLIC BLOOD PRESSURE: 132 MMHG | DIASTOLIC BLOOD PRESSURE: 60 MMHG | TEMPERATURE: 98.9 F | HEART RATE: 83 BPM

## 2020-01-20 DIAGNOSIS — C54.1 ENDOMETRIAL CANCER (HCC): ICD-10-CM

## 2020-01-20 LAB
BASOPHILS # BLD AUTO: 0.01 10*3/MM3 (ref 0–0.2)
BASOPHILS NFR BLD AUTO: 0.1 % (ref 0–1.5)
DEPRECATED RDW RBC AUTO: 70 FL (ref 37–54)
EOSINOPHIL # BLD AUTO: 0.17 10*3/MM3 (ref 0–0.4)
EOSINOPHIL NFR BLD AUTO: 2.2 % (ref 0.3–6.2)
ERYTHROCYTE [DISTWIDTH] IN BLOOD BY AUTOMATED COUNT: 20.2 % (ref 12.3–15.4)
HCT VFR BLD AUTO: 33.2 % (ref 34–46.6)
HGB BLD-MCNC: 10.7 G/DL (ref 12–15.9)
IMM GRANULOCYTES # BLD AUTO: 0.03 10*3/MM3 (ref 0–0.05)
IMM GRANULOCYTES NFR BLD AUTO: 0.4 % (ref 0–0.5)
LYMPHOCYTES # BLD AUTO: 1.29 10*3/MM3 (ref 0.7–3.1)
LYMPHOCYTES NFR BLD AUTO: 17 % (ref 19.6–45.3)
MCH RBC QN AUTO: 31.7 PG (ref 26.6–33)
MCHC RBC AUTO-ENTMCNC: 32.2 G/DL (ref 31.5–35.7)
MCV RBC AUTO: 98.2 FL (ref 79–97)
MONOCYTES # BLD AUTO: 0.54 10*3/MM3 (ref 0.1–0.9)
MONOCYTES NFR BLD AUTO: 7.1 % (ref 5–12)
NEUTROPHILS # BLD AUTO: 5.57 10*3/MM3 (ref 1.7–7)
NEUTROPHILS NFR BLD AUTO: 73.2 % (ref 42.7–76)
NRBC BLD AUTO-RTO: 0 /100 WBC (ref 0–0.2)
PLATELET # BLD AUTO: 154 10*3/MM3 (ref 140–450)
PMV BLD AUTO: 10.8 FL (ref 6–12)
RBC # BLD AUTO: 3.38 10*6/MM3 (ref 3.77–5.28)
WBC NRBC COR # BLD: 7.61 10*3/MM3 (ref 3.4–10.8)

## 2020-01-20 PROCEDURE — 85025 COMPLETE CBC W/AUTO DIFF WBC: CPT

## 2020-01-20 PROCEDURE — 36592 COLLECT BLOOD FROM PICC: CPT

## 2020-01-23 ENCOUNTER — INFUSION (OUTPATIENT)
Dept: ONCOLOGY | Facility: HOSPITAL | Age: 80
End: 2020-01-23

## 2020-01-23 VITALS
RESPIRATION RATE: 16 BRPM | DIASTOLIC BLOOD PRESSURE: 62 MMHG | TEMPERATURE: 98.3 F | HEART RATE: 83 BPM | SYSTOLIC BLOOD PRESSURE: 142 MMHG

## 2020-01-23 DIAGNOSIS — C54.1 ENDOMETRIAL CANCER (HCC): Primary | ICD-10-CM

## 2020-01-23 DIAGNOSIS — C54.1 ENDOMETRIAL CANCER (HCC): ICD-10-CM

## 2020-01-23 PROCEDURE — 96360 HYDRATION IV INFUSION INIT: CPT

## 2020-01-23 RX ORDER — SODIUM CHLORIDE 9 MG/ML
999 INJECTION, SOLUTION INTRAVENOUS ONCE
Status: COMPLETED | OUTPATIENT
Start: 2020-01-23 | End: 2020-01-23

## 2020-01-23 RX ORDER — SODIUM CHLORIDE 9 MG/ML
999 INJECTION, SOLUTION INTRAVENOUS ONCE
Status: CANCELLED | OUTPATIENT
Start: 2020-01-23

## 2020-01-23 RX ADMIN — SODIUM CHLORIDE 999 ML: 9 INJECTION, SOLUTION INTRAVENOUS at 13:47

## 2020-01-27 ENCOUNTER — APPOINTMENT (OUTPATIENT)
Dept: ONCOLOGY | Facility: HOSPITAL | Age: 80
End: 2020-01-27

## 2020-01-29 ENCOUNTER — OFFICE VISIT (OUTPATIENT)
Dept: GYNECOLOGIC ONCOLOGY | Facility: CLINIC | Age: 80
End: 2020-01-29

## 2020-01-29 ENCOUNTER — HOSPITAL ENCOUNTER (OUTPATIENT)
Dept: ONCOLOGY | Facility: HOSPITAL | Age: 80
Setting detail: INFUSION SERIES
Discharge: HOME | End: 2020-01-29

## 2020-01-29 VITALS
HEART RATE: 84 BPM | HEIGHT: 63 IN | WEIGHT: 116.5 LBS | SYSTOLIC BLOOD PRESSURE: 138 MMHG | RESPIRATION RATE: 16 BRPM | TEMPERATURE: 97.5 F | DIASTOLIC BLOOD PRESSURE: 91 MMHG | OXYGEN SATURATION: 95 % | BODY MASS INDEX: 20.64 KG/M2

## 2020-01-29 VITALS — DIASTOLIC BLOOD PRESSURE: 42 MMHG | SYSTOLIC BLOOD PRESSURE: 105 MMHG | HEART RATE: 75 BPM

## 2020-01-29 DIAGNOSIS — F06.4 ANXIETY DISORDER DUE TO MEDICAL CONDITION: ICD-10-CM

## 2020-01-29 DIAGNOSIS — D70.1 CHEMOTHERAPY-INDUCED NEUTROPENIA (HCC): ICD-10-CM

## 2020-01-29 DIAGNOSIS — C54.1 ENDOMETRIAL CANCER (HCC): Primary | ICD-10-CM

## 2020-01-29 DIAGNOSIS — E86.0 DEHYDRATION: ICD-10-CM

## 2020-01-29 DIAGNOSIS — T45.1X5A CHEMOTHERAPY-INDUCED NEUTROPENIA (HCC): ICD-10-CM

## 2020-01-29 LAB
ALBUMIN SERPL-MCNC: 4.4 G/DL (ref 3.5–5.2)
ALBUMIN/GLOB SERPL: 1.7 G/DL
ALP SERPL-CCNC: 110 U/L (ref 39–117)
ALT SERPL W P-5'-P-CCNC: 16 U/L (ref 1–33)
ANION GAP SERPL CALCULATED.3IONS-SCNC: 13 MMOL/L (ref 5–15)
AST SERPL-CCNC: 23 U/L (ref 1–32)
BILIRUB SERPL-MCNC: 0.4 MG/DL (ref 0.2–1.2)
BUN BLD-MCNC: 20 MG/DL (ref 8–23)
BUN/CREAT SERPL: 26.7 (ref 7–25)
CALCIUM SPEC-SCNC: 9.7 MG/DL (ref 8.6–10.5)
CANCER AG125 SERPL QL: 8.1 U/ML (ref 0–38.1)
CHLORIDE SERPL-SCNC: 105 MMOL/L (ref 98–107)
CO2 SERPL-SCNC: 24 MMOL/L (ref 22–29)
CREAT BLD-MCNC: 0.75 MG/DL (ref 0.57–1)
CREAT BLDA-MCNC: 0.8 MG/DL (ref 0.6–1.3)
ERYTHROCYTE [DISTWIDTH] IN BLOOD BY AUTOMATED COUNT: 22.8 % (ref 12.3–15.4)
GFR SERPL CREATININE-BSD FRML MDRD: 75 ML/MIN/1.73
GLOBULIN UR ELPH-MCNC: 2.6 GM/DL
GLUCOSE BLD-MCNC: 97 MG/DL (ref 65–99)
HCT VFR BLD AUTO: 36.5 % (ref 34–46.6)
HGB BLD-MCNC: 11.8 G/DL (ref 12–15.9)
LYMPHOCYTES # BLD AUTO: 1.5 10*3/MM3 (ref 0.7–3.1)
LYMPHOCYTES NFR BLD AUTO: 16.7 % (ref 19.6–45.3)
MCH RBC QN AUTO: 30.9 PG (ref 26.6–33)
MCHC RBC AUTO-ENTMCNC: 32.2 G/DL (ref 31.5–35.7)
MCV RBC AUTO: 95.8 FL (ref 79–97)
MONOCYTES # BLD AUTO: 0.6 10*3/MM3 (ref 0.1–0.9)
MONOCYTES NFR BLD AUTO: 6.5 % (ref 5–12)
NEUTROPHILS # BLD AUTO: 6.8 10*3/MM3 (ref 1.7–7)
NEUTROPHILS NFR BLD AUTO: 76.8 % (ref 42.7–76)
PLATELET # BLD AUTO: 140 10*3/MM3 (ref 140–450)
PMV BLD AUTO: 6.6 FL (ref 6–12)
POTASSIUM BLD-SCNC: 4.4 MMOL/L (ref 3.5–5.2)
PROT SERPL-MCNC: 7 G/DL (ref 6–8.5)
RBC # BLD AUTO: 3.81 10*6/MM3 (ref 3.77–5.28)
SODIUM BLD-SCNC: 142 MMOL/L (ref 136–145)
WBC NRBC COR # BLD: 8.8 10*3/MM3 (ref 3.4–10.8)

## 2020-01-29 PROCEDURE — 25010000002 PACLITAXEL PER 30 MG: Performed by: OBSTETRICS & GYNECOLOGY

## 2020-01-29 PROCEDURE — 96375 TX/PRO/DX INJ NEW DRUG ADDON: CPT

## 2020-01-29 PROCEDURE — 96413 CHEMO IV INFUSION 1 HR: CPT

## 2020-01-29 PROCEDURE — 25010000002 CARBOPLATIN PER 50 MG: Performed by: OBSTETRICS & GYNECOLOGY

## 2020-01-29 PROCEDURE — 85025 COMPLETE CBC W/AUTO DIFF WBC: CPT | Performed by: OBSTETRICS & GYNECOLOGY

## 2020-01-29 PROCEDURE — 25010000002 DIPHENHYDRAMINE PER 50 MG: Performed by: OBSTETRICS & GYNECOLOGY

## 2020-01-29 PROCEDURE — 96417 CHEMO IV INFUS EACH ADDL SEQ: CPT

## 2020-01-29 PROCEDURE — 82565 ASSAY OF CREATININE: CPT

## 2020-01-29 PROCEDURE — 96415 CHEMO IV INFUSION ADDL HR: CPT

## 2020-01-29 PROCEDURE — 80053 COMPREHEN METABOLIC PANEL: CPT | Performed by: OBSTETRICS & GYNECOLOGY

## 2020-01-29 PROCEDURE — 96377 APPLICATON ON-BODY INJECTOR: CPT

## 2020-01-29 PROCEDURE — 25010000002 PEGFILGRASTIM 6 MG/0.6ML PREFILLED SYRINGE KIT: Performed by: OBSTETRICS & GYNECOLOGY

## 2020-01-29 PROCEDURE — 96367 TX/PROPH/DG ADDL SEQ IV INF: CPT

## 2020-01-29 PROCEDURE — 99214 OFFICE O/P EST MOD 30 MIN: CPT | Performed by: OBSTETRICS & GYNECOLOGY

## 2020-01-29 PROCEDURE — 25010000002 DEXAMETHASONE PER 1 MG: Performed by: OBSTETRICS & GYNECOLOGY

## 2020-01-29 PROCEDURE — 96366 THER/PROPH/DIAG IV INF ADDON: CPT

## 2020-01-29 PROCEDURE — 25010000002 PALONOSETRON 0.25 MG/5ML SOLUTION PREFILLED SYRINGE: Performed by: OBSTETRICS & GYNECOLOGY

## 2020-01-29 PROCEDURE — 86304 IMMUNOASSAY TUMOR CA 125: CPT | Performed by: OBSTETRICS & GYNECOLOGY

## 2020-01-29 PROCEDURE — 25010000002 FOSAPREPITANT PER 1 MG: Performed by: OBSTETRICS & GYNECOLOGY

## 2020-01-29 RX ORDER — PALONOSETRON 0.05 MG/ML
0.25 INJECTION, SOLUTION INTRAVENOUS ONCE
Status: CANCELLED | OUTPATIENT
Start: 2020-01-29

## 2020-01-29 RX ORDER — FAMOTIDINE 10 MG/ML
20 INJECTION, SOLUTION INTRAVENOUS ONCE
Status: CANCELLED | OUTPATIENT
Start: 2020-01-29

## 2020-01-29 RX ORDER — SODIUM CHLORIDE 9 MG/ML
250 INJECTION, SOLUTION INTRAVENOUS ONCE
Status: CANCELLED | OUTPATIENT
Start: 2020-01-29

## 2020-01-29 RX ORDER — FAMOTIDINE 10 MG/ML
20 INJECTION, SOLUTION INTRAVENOUS ONCE
Status: COMPLETED | OUTPATIENT
Start: 2020-01-29 | End: 2020-01-29

## 2020-01-29 RX ORDER — DIPHENHYDRAMINE HYDROCHLORIDE 50 MG/ML
50 INJECTION INTRAMUSCULAR; INTRAVENOUS AS NEEDED
Status: CANCELLED | OUTPATIENT
Start: 2020-01-29

## 2020-01-29 RX ORDER — PALONOSETRON HYDROCHLORIDE 0.05 MG/ML
0.25 INJECTION, SOLUTION INTRAVENOUS ONCE
Status: COMPLETED | OUTPATIENT
Start: 2020-01-29 | End: 2020-01-29

## 2020-01-29 RX ORDER — FAMOTIDINE 10 MG/ML
20 INJECTION, SOLUTION INTRAVENOUS AS NEEDED
Status: CANCELLED | OUTPATIENT
Start: 2020-01-29

## 2020-01-29 RX ORDER — SODIUM CHLORIDE 9 MG/ML
250 INJECTION, SOLUTION INTRAVENOUS ONCE
Status: COMPLETED | OUTPATIENT
Start: 2020-01-29 | End: 2020-01-29

## 2020-01-29 RX ADMIN — DEXAMETHASONE SODIUM PHOSPHATE 20 MG: 4 INJECTION, SOLUTION INTRAMUSCULAR; INTRAVENOUS at 11:19

## 2020-01-29 RX ADMIN — CARBOPLATIN 440 MG: 10 INJECTION, SOLUTION INTRAVENOUS at 15:01

## 2020-01-29 RX ADMIN — PALONOSETRON 0.25 MG: 0.25 INJECTION, SOLUTION INTRAVENOUS at 11:31

## 2020-01-29 RX ADMIN — FAMOTIDINE 20 MG: 10 INJECTION, SOLUTION INTRAVENOUS at 11:18

## 2020-01-29 RX ADMIN — SODIUM CHLORIDE 150 MG: 9 INJECTION, SOLUTION INTRAVENOUS at 11:36

## 2020-01-29 RX ADMIN — SODIUM CHLORIDE 250 ML: 9 INJECTION, SOLUTION INTRAVENOUS at 11:19

## 2020-01-29 RX ADMIN — DIPHENHYDRAMINE HYDROCHLORIDE 50 MG: 50 INJECTION INTRAMUSCULAR; INTRAVENOUS at 11:19

## 2020-01-29 RX ADMIN — PEGFILGRASTIM 6 MG: KIT SUBCUTANEOUS at 15:19

## 2020-01-29 RX ADMIN — SODIUM CHLORIDE 275 MG: 9 INJECTION, SOLUTION INTRAVENOUS at 11:58

## 2020-01-29 NOTE — PROGRESS NOTES
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 year old female who presents today for treatment and evaluation of the above issues.    Patient is doing quite well.  She denies any neuropathy.  She is walking daily.  She notes adequate appetite but that it generally is decreased the first week after chemotherapy.  She complains of fatigue which is fairly significant for the first 5 days after chemotherapy.  She is undergoing IV hydration as needed and notes that this dramatically helps with her fatigue and chemotherapy/GCSF related myalgias and arthralgias.  She is using stool softeners and MiraLAX to facilitate bowel movements and has no complaints regarding bowel and bladder function.  She overall has a positive attitude today and is accompanied by her  as usual.    Oncologic History:     Endometrial cancer (CMS/HCC)    9/18/2019 Initial Diagnosis     Endometrial biopsy by Dr. Lam due to recurrent postmenopausal bleeding. Pathology showed cytologic atypia in background of extensive tumor necrosis.  =36.6. Referred to Gyn Oncology      9/18/2019 Imaging     CT abd/pelvis IMPRESSION:  1. Approximately 7 cm central uterine mass, strongly vascular along its  posterior lateral right margin at approximately 7:00 as noted above. No  evidence of invasion into the surrounding soft tissues.  2. No evidence of adenopathy or other metastatic disease.  3. 2 cm nonenhancing water density right ovarian cyst.  4. Incidentally noted small hepatic cyst and normal variant bilateral  renal parapelvic cysts.  Pre-op CXR ARIS      9/27/2019 Surgery     Exploratory laparotomy, Type 1 radical hysterectomy, BSO, pelvic lymph node dissection, omentectomy, cystoscopy with bilateral temporary ureteral stents.     Final  pathology revealed carcinosarcoma, multiple foci measuring up to 7.8 cm, filling entire endometrial cavity, with minimal myometrial invasion (up to 1/11 mm, 9% invasion). 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 Sarcomatous component is undifferentiated. Cytology negative, no LVSI, nodes negative. Stage IA         Chemotherapy     OP UTERINE PACLitaxel / CARBOplatin (Q21D)  Dose reduction related to neutropenia.      11/19/2019 - 12/5/2019 Radiation     Radiation OncologyTreatment Course:  Yin Siddiqui received 3000 cGy in 5 fractions to vagina via High Dose Radiation - HDR.      12/11/2019 -  Chemotherapy     OP FILGRASTIM (NEUPOGEN)           Past Medical History:   Diagnosis Date   • Anemia    • Degenerative disc disease, lumbar     hands and knees    • Dyslipidemia    • Endometrial cancer (CMS/HCC) 9/27/2019   • GERD (gastroesophageal reflux disease)    • Hyperlipidemia    • Menopause    • OAB (overactive bladder)    • Osteopenia    • PMB (postmenopausal bleeding)    • Skin cancer    • Urge incontinence    • Vaginal atrophy    • Wears dentures     upper   • Wears reading eyeglasses        Past Surgical History:   Procedure Laterality Date   • CATARACT EXTRACTION W/ INTRAOCULAR LENS  IMPLANT, BILATERAL     • COLONOSCOPY  2019   • CYST REMOVAL      left wrist   • CYSTOSCOPY W/ URETERAL STENT PLACEMENT Bilateral 9/27/2019    Procedure: CYSTOSCOPY WITH BILATERAL TEMPORARY URETERAL STENT INSERTION;  Surgeon: Dasha Claudio MD;  Location:  [facility] OR;  Service: Gynecology   • D&C HYSTEROSCOPY     • EXPLORATORY LAPAROTOMY, TOTAL ABDOMINAL HYSTERECTOMY, SALPINGO OOPHORECTOMY WITH TUMOR DEBULKING Bilateral 9/27/2019    Procedure: EXPLORATORY LAPAROTOMY TYPE 1, RADICAL TOTAL ABDOMINAL HYSTERECTOMY, BILATERAL SALPINGO OOPHORECTOMY, PELVIC LYMPH NODE DISSECTION, OMENTUMECTOMY;  Surgeon: Dasha Claudio MD;  Location:  [facility] OR;  Service: Gynecology   • EYE SURGERY Left 09/05/2019    macular hole    •  EYE SURGERY      as a child for muscular problems  x 3   • HAND SURGERY Right     bone removed    • TONSILLECTOMY         MEDICATIONS: The current medication list was reviewed with the patient and updated in the EMR this date per the Medical Assistant. Medication dosages and frequencies were confirmed to be accurate.      Allergies:  has No Known Allergies.    Social History:   Social History     Socioeconomic History   • Marital status:      Spouse name: Not on file   • Number of children: Not on file   • Years of education: Not on file   • Highest education level: Not on file   Tobacco Use   • Smoking status: Never Smoker   • Smokeless tobacco: Never Used   Substance and Sexual Activity   • Alcohol use: No   • Drug use: No   • Sexual activity: Defer     Partners: Male     Birth control/protection: Post-menopausal       Family History:    Family History   Problem Relation Age of Onset   • Breast cancer Mother 78   • Ovarian cancer Neg Hx        Health Maintenance:    Health Maintenance   Topic Date Due   • TDAP/TD VACCINES (1 - Tdap) 10/03/1953   • Pneumococcal Vaccine Once at 65 Years Old  10/03/2007   • MEDICARE ANNUAL WELLNESS  04/10/2017   • DXA SCAN  05/23/2019   • ZOSTER VACCINE (2 of 2) 08/12/2019   • LIPID PANEL  09/26/2019   • MAMMOGRAM  05/15/2021   • COLONOSCOPY  04/16/2029   • INFLUENZA VACCINE  Completed         Review of Systems   Constitutional: Positive for fatigue. Negative for unexpected weight change.   HENT: Negative for ear pain and rhinorrhea.    Eyes: Positive for visual disturbance. Negative for pain and redness.   Respiratory: Negative for cough and shortness of breath.    Cardiovascular: Negative for chest pain and leg swelling.   Gastrointestinal: Negative for abdominal pain, constipation and diarrhea.   Endocrine: Negative for polydipsia and polyphagia.   Genitourinary: Negative for flank pain, pelvic pain and vaginal bleeding.   Musculoskeletal: Positive for arthralgias. Negative  "for myalgias.   Skin: Negative for pallor and rash.   Allergic/Immunologic: Negative for food allergies and immunocompromised state.   Neurological: Positive for weakness. Negative for syncope and light-headedness.   Hematological: Negative for adenopathy. Does not bruise/bleed easily.   Psychiatric/Behavioral: Negative for dysphoric mood and suicidal ideas.       Physical Exam    Vitals:    01/29/20 0941   BP: 138/91   Pulse: 84   Resp: 16   Temp: 97.5 °F (36.4 °C)   TempSrc: Temporal   SpO2: 95%   Weight: 52.8 kg (116 lb 8 oz)   Height: 160 cm (62.99\")   PainSc: 0-No pain     Body mass index is 20.64 kg/m².    Wt Readings from Last 3 Encounters:   01/29/20 52.8 kg (116 lb 8 oz)   01/08/20 54.4 kg (119 lb 14.4 oz)   01/08/20 54.4 kg (119 lb 14.4 oz)         GENERAL: Alert, thin appearing female appearing her stated age who is in no apparent distress.   HEENT: Sclera anicteric. Head normocephalic, atraumatic. Mucus membranes moist.  Alopecia.  NECK: Trachea midline, supple, without masses.  No thyromegaly.   BREASTS: Deferred  CARDIOVASCULAR: Normal rate, regular rhythm, no murmurs, rubs, or gallops.  No peripheral edema.  RESPIRATORY: Clear to auscultation bilaterally, normal respiratory effort  BACK:  No CVA tenderness, no vertebral tenderness on palpation  GASTROINTESTINAL:  Abdomen is soft, non-tender, non-distended, no rebound or guarding, no masses, or hernias. No HSM.    SKIN:  Warm, dry, well-perfused.  All visible areas intact.  No rashes, lesions, ulcers.  PSYCHIATRIC: AO x3, with appropriate affect, normal thought processes.  NEUROLOGIC: No focal deficits. Moves extremities well.  MUSCULOSKELETAL: Normal gait and station.   EXTREMITIES:   No cyanosis, clubbing, symmetric.  PICC RUE.  LYMPHATICS:  No inguinal adenopathy noted, vague fullness left supraclavicular area otherwise no adenopathy appreciated.     PELVIC exam:    GYNECOLOGIC:  Deferred     ECOG PS 0    PROCEDURES:  none    Diagnostic Data:  "     Lab Results   Component Value Date    WBC 8.80 01/29/2020    HGB 11.8 (L) 01/29/2020    HCT 36.5 01/29/2020    MCV 95.8 01/29/2020     01/29/2020    NEUTROABS 6.80 01/29/2020    GLUCOSE 97 01/29/2020    BUN 20 01/29/2020    CREATININE 0.80 01/29/2020    EGFRIFNONA 75 01/29/2020     01/29/2020    K 4.4 01/29/2020     01/29/2020    CO2 24.0 01/29/2020    MG 1.8 12/03/2019    CALCIUM 9.7 01/29/2020    ALBUMIN 4.40 01/29/2020    AST 23 01/29/2020    ALT 16 01/29/2020    BILITOT 0.4 01/29/2020     Lab Results   Component Value Date     8.1 01/29/2020     8.9 01/08/2020     9.6 12/18/2019     9.9 11/27/2019     36.6 09/15/2019           Assessment/Plan   This is a 79 y.o. woman who presents for toxicity evaluation while on chemotherapy for carcinosarcoma of the uterus.    Uterine carcinosarcoma  - Cycle #5 of 6 planned due today pending laboratory evaluation.  Patient tolerating treatment well.  No dose modification today.  - PICC RUE     Chemotherapy induced neutropenia   - Neulasta OBI, EMLA cream to decrease discomfort with application    Chemotherapy-induced neuropathy  -Improved with IV fluids which is not typical.  However, patient is active and symptoms are manageable.  No neuropathy today.    Dehydration  -IV fluids PRN    Pain assessment was performed today as a part of patient’s care.  For patients with pain related to surgery, gynecologic malignancy or cancer treatment, the plan is as noted in the assessment/plan.  For patients with pain not related to these issues, they are to seek any further needed care from a more appropriate provider, such as PCP.    Orders Placed This Encounter   Procedures   • CBC & Differential     Standing Status:   Future     Standing Expiration Date:   2/8/2021     Order Specific Question:   Manual Differential     Answer:   No       FOLLOW UP: Consideration of cycle # 6    Dasha Claudio MD  01/29/20  10:57  AM

## 2020-01-31 ENCOUNTER — INFUSION (OUTPATIENT)
Dept: ONCOLOGY | Facility: HOSPITAL | Age: 80
End: 2020-01-31

## 2020-01-31 VITALS
DIASTOLIC BLOOD PRESSURE: 53 MMHG | SYSTOLIC BLOOD PRESSURE: 122 MMHG | TEMPERATURE: 98 F | HEART RATE: 70 BPM | RESPIRATION RATE: 16 BRPM

## 2020-01-31 DIAGNOSIS — C54.1 ENDOMETRIAL CANCER (HCC): Primary | ICD-10-CM

## 2020-01-31 PROCEDURE — 96360 HYDRATION IV INFUSION INIT: CPT

## 2020-01-31 RX ORDER — SODIUM CHLORIDE 9 MG/ML
999 INJECTION, SOLUTION INTRAVENOUS ONCE
Status: CANCELLED | OUTPATIENT
Start: 2020-01-31

## 2020-01-31 RX ORDER — SODIUM CHLORIDE 9 MG/ML
999 INJECTION, SOLUTION INTRAVENOUS ONCE
Status: COMPLETED | OUTPATIENT
Start: 2020-01-31 | End: 2020-01-31

## 2020-01-31 RX ADMIN — SODIUM CHLORIDE 999 ML: 9 INJECTION, SOLUTION INTRAVENOUS at 10:40

## 2020-02-03 ENCOUNTER — TELEPHONE (OUTPATIENT)
Dept: GYNECOLOGIC ONCOLOGY | Facility: CLINIC | Age: 80
End: 2020-02-03

## 2020-02-03 ENCOUNTER — INFUSION (OUTPATIENT)
Dept: ONCOLOGY | Facility: HOSPITAL | Age: 80
End: 2020-02-03

## 2020-02-03 VITALS
HEART RATE: 99 BPM | SYSTOLIC BLOOD PRESSURE: 119 MMHG | TEMPERATURE: 98.2 F | RESPIRATION RATE: 16 BRPM | DIASTOLIC BLOOD PRESSURE: 74 MMHG

## 2020-02-03 DIAGNOSIS — C54.1 ENDOMETRIAL CANCER (HCC): Primary | ICD-10-CM

## 2020-02-03 PROCEDURE — 96360 HYDRATION IV INFUSION INIT: CPT

## 2020-02-03 RX ORDER — SODIUM CHLORIDE 9 MG/ML
999 INJECTION, SOLUTION INTRAVENOUS ONCE
Status: CANCELLED | OUTPATIENT
Start: 2020-02-03

## 2020-02-03 RX ORDER — SODIUM CHLORIDE 9 MG/ML
999 INJECTION, SOLUTION INTRAVENOUS ONCE
Status: COMPLETED | OUTPATIENT
Start: 2020-02-03 | End: 2020-02-03

## 2020-02-03 RX ADMIN — SODIUM CHLORIDE 999 ML: 9 INJECTION, SOLUTION INTRAVENOUS at 14:04

## 2020-02-03 NOTE — TELEPHONE ENCOUNTER
Pt's  called and left message asking when pt's keyana labs are due.  I returned call.  No answer.  I left message that keyana labs are due 02/10/2020 and pt already has an appointment scheduled for this.

## 2020-02-05 ENCOUNTER — TELEPHONE (OUTPATIENT)
Dept: GYNECOLOGIC ONCOLOGY | Facility: CLINIC | Age: 80
End: 2020-02-05

## 2020-02-05 ENCOUNTER — INFUSION (OUTPATIENT)
Dept: ONCOLOGY | Facility: HOSPITAL | Age: 80
End: 2020-02-05

## 2020-02-05 VITALS
RESPIRATION RATE: 16 BRPM | HEART RATE: 77 BPM | SYSTOLIC BLOOD PRESSURE: 132 MMHG | TEMPERATURE: 97.3 F | DIASTOLIC BLOOD PRESSURE: 54 MMHG

## 2020-02-05 DIAGNOSIS — C54.1 ENDOMETRIAL CANCER (HCC): ICD-10-CM

## 2020-02-05 PROCEDURE — G0463 HOSPITAL OUTPT CLINIC VISIT: HCPCS

## 2020-02-05 NOTE — TELEPHONE ENCOUNTER
Alistair from [facility] infusion called and states patient is there for Picc Dressing change. She has an appointment on Monday the 10th for [facility], she wants to know if this can be moved to Wednesday since the patient is having a dressing change that day. Petra LOUIS states that is too long for Ansley labs, 14 days.  They will keep scheduled day.

## 2020-02-10 ENCOUNTER — LAB (OUTPATIENT)
Dept: ONCOLOGY | Facility: HOSPITAL | Age: 80
End: 2020-02-10

## 2020-02-10 VITALS
BODY MASS INDEX: 20.55 KG/M2 | SYSTOLIC BLOOD PRESSURE: 105 MMHG | WEIGHT: 116 LBS | RESPIRATION RATE: 16 BRPM | TEMPERATURE: 99.4 F | HEART RATE: 81 BPM | DIASTOLIC BLOOD PRESSURE: 63 MMHG

## 2020-02-10 DIAGNOSIS — C54.1 ENDOMETRIAL CANCER (HCC): Primary | ICD-10-CM

## 2020-02-10 LAB
BASOPHILS # BLD AUTO: 0.02 10*3/MM3 (ref 0–0.2)
BASOPHILS NFR BLD AUTO: 0.3 % (ref 0–1.5)
DEPRECATED RDW RBC AUTO: 71.6 FL (ref 37–54)
EOSINOPHIL # BLD AUTO: 0.07 10*3/MM3 (ref 0–0.4)
EOSINOPHIL NFR BLD AUTO: 1.1 % (ref 0.3–6.2)
ERYTHROCYTE [DISTWIDTH] IN BLOOD BY AUTOMATED COUNT: 19.4 % (ref 12.3–15.4)
HCT VFR BLD AUTO: 32.6 % (ref 34–46.6)
HGB BLD-MCNC: 10.5 G/DL (ref 12–15.9)
IMM GRANULOCYTES # BLD AUTO: 0.04 10*3/MM3 (ref 0–0.05)
IMM GRANULOCYTES NFR BLD AUTO: 0.6 % (ref 0–0.5)
LYMPHOCYTES # BLD AUTO: 1.22 10*3/MM3 (ref 0.7–3.1)
LYMPHOCYTES NFR BLD AUTO: 18.8 % (ref 19.6–45.3)
MCH RBC QN AUTO: 33.1 PG (ref 26.6–33)
MCHC RBC AUTO-ENTMCNC: 32.2 G/DL (ref 31.5–35.7)
MCV RBC AUTO: 102.8 FL (ref 79–97)
MONOCYTES # BLD AUTO: 0.69 10*3/MM3 (ref 0.1–0.9)
MONOCYTES NFR BLD AUTO: 10.6 % (ref 5–12)
NEUTROPHILS # BLD AUTO: 4.44 10*3/MM3 (ref 1.7–7)
NEUTROPHILS NFR BLD AUTO: 68.6 % (ref 42.7–76)
NRBC BLD AUTO-RTO: 0 /100 WBC (ref 0–0.2)
PLATELET # BLD AUTO: 135 10*3/MM3 (ref 140–450)
PMV BLD AUTO: 11 FL (ref 6–12)
RBC # BLD AUTO: 3.17 10*6/MM3 (ref 3.77–5.28)
WBC NRBC COR # BLD: 6.48 10*3/MM3 (ref 3.4–10.8)

## 2020-02-10 PROCEDURE — 36592 COLLECT BLOOD FROM PICC: CPT

## 2020-02-10 PROCEDURE — 85025 COMPLETE CBC W/AUTO DIFF WBC: CPT

## 2020-02-10 RX ORDER — SODIUM CHLORIDE 9 MG/ML
999 INJECTION, SOLUTION INTRAVENOUS ONCE
Status: DISCONTINUED | OUTPATIENT
Start: 2020-02-10 | End: 2020-02-10 | Stop reason: HOSPADM

## 2020-02-10 RX ORDER — SODIUM CHLORIDE 9 MG/ML
999 INJECTION, SOLUTION INTRAVENOUS ONCE
Status: CANCELLED | OUTPATIENT
Start: 2020-02-10

## 2020-02-12 ENCOUNTER — INFUSION (OUTPATIENT)
Dept: ONCOLOGY | Facility: HOSPITAL | Age: 80
End: 2020-02-12

## 2020-02-12 VITALS
RESPIRATION RATE: 16 BRPM | HEART RATE: 76 BPM | TEMPERATURE: 97.8 F | DIASTOLIC BLOOD PRESSURE: 61 MMHG | SYSTOLIC BLOOD PRESSURE: 126 MMHG

## 2020-02-12 DIAGNOSIS — C54.1 ENDOMETRIAL CANCER (HCC): ICD-10-CM

## 2020-02-12 PROCEDURE — G0463 HOSPITAL OUTPT CLINIC VISIT: HCPCS

## 2020-02-18 NOTE — PROGRESS NOTES
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 female who presents today for treatment and evaluation of the above issues.    Patient presents today complaining of fatigue, shakiness, weakness, and poor p.o. intake.  She had a cup of soup last night and vomited.  She is dry heaving in clinic this morning with ice chips and is pretty much been this way since the past 36 hours.  She complains of chills and shortness of breath.  She denies cough and dysuria.  She notes frequent bowel movements and has had to sit so far today.  She had bright red blood in the toilet after her bowel movements yesterday and today.  She has abdominal pelvic pain with standing.    Oncologic History:     Endometrial cancer (CMS/HCC)    9/18/2019 Initial Diagnosis     Endometrial biopsy by Dr. Lam due to recurrent postmenopausal bleeding. Pathology showed cytologic atypia in background of extensive tumor necrosis.  =36.6. Referred to Gyn Oncology      9/18/2019 Imaging     CT abd/pelvis IMPRESSION:  1. Approximately 7 cm central uterine mass, strongly vascular along its  posterior lateral right margin at approximately 7:00 as noted above. No  evidence of invasion into the surrounding soft tissues.  2. No evidence of adenopathy or other metastatic disease.  3. 2 cm nonenhancing water density right ovarian cyst.  4. Incidentally noted small hepatic cyst and normal variant bilateral  renal parapelvic cysts.  Pre-op CXR ARIS      9/27/2019 Surgery     Exploratory laparotomy, Type 1 radical hysterectomy, BSO, pelvic lymph node dissection, omentectomy, cystoscopy with bilateral temporary ureteral stents.     Final pathology revealed carcinosarcoma, multiple foci measuring up to 7.8 cm, filling entire endometrial cavity, with  minimal myometrial invasion (up to 1/11 mm, 9% invasion). 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 Sarcomatous component is undifferentiated. Cytology negative, no LVSI, nodes negative. Stage IA         Chemotherapy     OP UTERINE PACLitaxel / CARBOplatin (Q21D)  Dose reduction related to neutropenia.      11/19/2019 - 12/5/2019 Radiation     Radiation OncologyTreatment Course:  Yin Siddiqui received 3000 cGy in 5 fractions to vagina via High Dose Radiation - HDR.      12/11/2019 -  Chemotherapy     OP FILGRASTIM (NEUPOGEN)           Past Medical History:   Diagnosis Date   • Anemia    • Degenerative disc disease, lumbar     hands and knees    • Dyslipidemia    • Endometrial cancer (CMS/HCC) 9/27/2019   • GERD (gastroesophageal reflux disease)    • Hyperlipidemia    • Menopause    • OAB (overactive bladder)    • Osteopenia    • PMB (postmenopausal bleeding)    • Skin cancer    • Urge incontinence    • Vaginal atrophy    • Wears dentures     upper   • Wears reading eyeglasses        Past Surgical History:   Procedure Laterality Date   • CATARACT EXTRACTION W/ INTRAOCULAR LENS  IMPLANT, BILATERAL     • COLONOSCOPY  2019   • CYST REMOVAL      left wrist   • CYSTOSCOPY W/ URETERAL STENT PLACEMENT Bilateral 9/27/2019    Procedure: CYSTOSCOPY WITH BILATERAL TEMPORARY URETERAL STENT INSERTION;  Surgeon: Dasha Claudio MD;  Location:  [facility];  Service: Gynecology   • D&C HYSTEROSCOPY     • EXPLORATORY LAPAROTOMY, TOTAL ABDOMINAL HYSTERECTOMY, SALPINGO OOPHORECTOMY WITH TUMOR DEBULKING Bilateral 9/27/2019    Procedure: EXPLORATORY LAPAROTOMY TYPE 1, RADICAL TOTAL ABDOMINAL HYSTERECTOMY, BILATERAL SALPINGO OOPHORECTOMY, PELVIC LYMPH NODE DISSECTION, OMENTUMECTOMY;  Surgeon: Dasha Claudio MD;  Location:  [facility];  Service: Gynecology   • EYE SURGERY Left 09/05/2019    macular hole    • EYE SURGERY      as a child for muscular problems  x 3   • HAND SURGERY Right     bone removed    • TONSILLECTOMY          MEDICATIONS: The current medication list was reviewed with the patient and updated in the EMR this date per the Medical Assistant. Medication dosages and frequencies were confirmed to be accurate.      Allergies:  has No Known Allergies.    Social History:   Social History     Socioeconomic History   • Marital status:      Spouse name: Not on file   • Number of children: Not on file   • Years of education: Not on file   • Highest education level: Not on file   Tobacco Use   • Smoking status: Never Smoker   • Smokeless tobacco: Never Used   Substance and Sexual Activity   • Alcohol use: No   • Drug use: No   • Sexual activity: Defer     Partners: Male     Birth control/protection: Post-menopausal       Family History:    Family History   Problem Relation Age of Onset   • Breast cancer Mother 78   • Ovarian cancer Neg Hx        Health Maintenance:    Health Maintenance   Topic Date Due   • TDAP/TD VACCINES (1 - Tdap) 10/03/1953   • Pneumococcal Vaccine Once at 65 Years Old  10/03/2007   • MEDICARE ANNUAL WELLNESS  04/10/2017   • DXA SCAN  05/23/2019   • ZOSTER VACCINE (2 of 2) 08/12/2019   • LIPID PANEL  09/26/2019   • MAMMOGRAM  05/15/2021   • COLONOSCOPY  04/16/2029   • INFLUENZA VACCINE  Completed     Review of Systems   Constitutional: Positive for chills and fatigue. Negative for fever and unexpected weight change.   HENT: Negative for ear pain and rhinorrhea.    Eyes: Positive for visual disturbance. Negative for pain and redness.   Respiratory: Positive for shortness of breath. Negative for cough.    Cardiovascular: Negative for chest pain and leg swelling.   Gastrointestinal: Positive for abdominal pain. Negative for constipation and diarrhea.   Endocrine: Negative for polydipsia and polyphagia.   Genitourinary: Positive for pelvic pain. Negative for flank pain and vaginal bleeding.   Musculoskeletal: Positive for arthralgias. Negative for myalgias.   Skin: Negative for pallor and rash.    Allergic/Immunologic: Negative for food allergies and immunocompromised state.   Neurological: Positive for dizziness and weakness. Negative for syncope and light-headedness.   Hematological: Negative for adenopathy. Does not bruise/bleed easily.   Psychiatric/Behavioral: Negative for dysphoric mood and suicidal ideas.     Physical Exam    Vitals:    02/19/20 0902   BP: (!) 87/40   Pulse: 107   Resp: 16   Temp: 96.9 °F (36.1 °C)   TempSrc: Temporal   SpO2: 98%   Weight: 52.6 kg (116 lb)     Body mass index is 20.55 kg/m².    Wt Readings from Last 3 Encounters:   02/19/20 52.6 kg (116 lb)   02/10/20 52.6 kg (116 lb)   01/29/20 52.8 kg (116 lb 8 oz)         GENERAL: Alert, thin appearing female appearing her stated age who is actively vomiting through part of today's evaluation.  HEENT: Sclera anicteric. Head normocephalic, atraumatic.  Dry mucous membranes, alopecia.  NECK: Trachea midline, supple, without masses.  No thyromegaly.   BREASTS: Deferred  CARDIOVASCULAR: Normal rate, regular rhythm, no murmurs, rubs, or gallops.  No peripheral edema.  RESPIRATORY: Clear to auscultation bilaterally, normal respiratory effort  BACK:  No CVA tenderness, no vertebral tenderness on palpation  GASTROINTESTINAL:  Abdomen is soft,non-distended, no rebound or guarding, no masses, or hernias. No HSM.  Mild tenderness at mid abdomen.  SKIN:  Warm, dry, well-perfused.  All visible areas intact.  No rashes, lesions, ulcers.  PSYCHIATRIC: AO x3, with appropriate affect, normal thought processes.  NEUROLOGIC: No focal deficits. Moves extremities well.  MUSCULOSKELETAL: Normal gait and station.   EXTREMITIES:   No cyanosis, clubbing, symmetric.  PICC RUE.  LYMPHATICS:  No inguinal or cervical adenopathy appreciated     PELVIC exam:    GYNECOLOGIC:  Deferred     ECOG PS 0    PROCEDURES:  none    Diagnostic Data:      Lab Results   Component Value Date    WBC 6.48 02/10/2020    HGB 10.5 (L) 02/10/2020    HCT 32.6 (L) 02/10/2020    MCV  102.8 (H) 02/10/2020     (L) 02/10/2020    NEUTROABS 4.44 02/10/2020    GLUCOSE 97 01/29/2020    BUN 20 01/29/2020    CREATININE 0.80 01/29/2020    EGFRIFNONA 75 01/29/2020     01/29/2020    K 4.4 01/29/2020     01/29/2020    CO2 24.0 01/29/2020    MG 1.8 12/03/2019    CALCIUM 9.7 01/29/2020    ALBUMIN 4.40 01/29/2020    AST 23 01/29/2020    ALT 16 01/29/2020    BILITOT 0.4 01/29/2020     Lab Results   Component Value Date     8.1 01/29/2020     8.9 01/08/2020     9.6 12/18/2019     9.9 11/27/2019     36.6 09/15/2019           Assessment/Plan   This is a 79 y.o. woman who presents for toxicity evaluation while on chemotherapy for carcinosarcoma of the uterus.    Uterine carcinosarcoma  - Cycle #6 of 6 planned due today pending laboratory evaluation.    - PICC RUE  -She was scheduled for chemotherapy, but is quite ill today.     Chemotherapy induced neutropenia   - Neulasta OBI, EMLA cream to decrease discomfort with application    Chemotherapy-induced neuropathy  -Improved with IV fluids which is not typical.  However, patient is active and symptoms are manageable.  No neuropathy today.    Dehydration, hypotension, nausea, vomiting, diarrhea, chills, shortness of breath  -Hospital admission for IV fluids.  We will further evaluate shortness of breath after she has received IV fluids.  She may need CT scan as well to exclude progression on treatment.  Will proceed with symptom management including antiemetics.  Will evaluate stool for ova and parasite, C. difficile.  I would anticipate that she would be in the hospital a minimum of 1 days.    Pain assessment was performed today as a part of patient’s care.  For patients with pain related to surgery, gynecologic malignancy or cancer treatment, the plan is as noted in the assessment/plan.  For patients with pain not related to these issues, they are to seek any further needed care from a more appropriate provider, such as  PCP.    No orders of the defined types were placed in this encounter.      Patient was seen and examined with Dr. Pagan,  resident, who performed portions of the examination and documentation for this patient's care under my direct supervision.  I agree with the above documentation and plan.    Dasha Claudio MD  02/19/20  9:57 AM

## 2020-02-19 ENCOUNTER — APPOINTMENT (OUTPATIENT)
Dept: GENERAL RADIOLOGY | Facility: HOSPITAL | Age: 80
End: 2020-02-19

## 2020-02-19 ENCOUNTER — HOSPITAL ENCOUNTER (INPATIENT)
Facility: HOSPITAL | Age: 80
LOS: 4 days | Discharge: HOME | End: 2020-02-23
Attending: OBSTETRICS & GYNECOLOGY | Admitting: OBSTETRICS & GYNECOLOGY

## 2020-02-19 ENCOUNTER — TELEPHONE (OUTPATIENT)
Dept: GYNECOLOGIC ONCOLOGY | Facility: CLINIC | Age: 80
End: 2020-02-19

## 2020-02-19 ENCOUNTER — HOSPITAL ENCOUNTER (OUTPATIENT)
Dept: ONCOLOGY | Facility: HOSPITAL | Age: 80
Discharge: HOME | End: 2020-02-19

## 2020-02-19 ENCOUNTER — HOSPITAL ENCOUNTER (OUTPATIENT)
Dept: ONCOLOGY | Facility: HOSPITAL | Age: 80
Setting detail: INFUSION SERIES
Discharge: HOME | End: 2020-02-19

## 2020-02-19 ENCOUNTER — OFFICE VISIT (OUTPATIENT)
Dept: GYNECOLOGIC ONCOLOGY | Facility: CLINIC | Age: 80
End: 2020-02-19

## 2020-02-19 VITALS
RESPIRATION RATE: 16 BRPM | DIASTOLIC BLOOD PRESSURE: 40 MMHG | SYSTOLIC BLOOD PRESSURE: 87 MMHG | WEIGHT: 116 LBS | TEMPERATURE: 96.9 F | HEART RATE: 107 BPM | OXYGEN SATURATION: 98 % | BODY MASS INDEX: 20.55 KG/M2

## 2020-02-19 DIAGNOSIS — C54.1 ENDOMETRIAL CANCER (HCC): ICD-10-CM

## 2020-02-19 DIAGNOSIS — E86.0 DEHYDRATION: ICD-10-CM

## 2020-02-19 DIAGNOSIS — R19.7 DIARRHEA OF PRESUMED INFECTIOUS ORIGIN: ICD-10-CM

## 2020-02-19 DIAGNOSIS — K92.0 HEMATEMESIS WITH NAUSEA: Primary | ICD-10-CM

## 2020-02-19 DIAGNOSIS — K26.9 DUODENAL ULCER: ICD-10-CM

## 2020-02-19 DIAGNOSIS — D62 ACUTE BLOOD LOSS ANEMIA: ICD-10-CM

## 2020-02-19 DIAGNOSIS — C54.1 ENDOMETRIAL CANCER (HCC): Primary | ICD-10-CM

## 2020-02-19 DIAGNOSIS — R11.2 INTRACTABLE VOMITING WITH NAUSEA, UNSPECIFIED VOMITING TYPE: ICD-10-CM

## 2020-02-19 LAB
ABO GROUP BLD: NORMAL
ALBUMIN SERPL-MCNC: 3.6 G/DL (ref 3.5–5.2)
ALBUMIN/GLOB SERPL: 2 G/DL
ALP SERPL-CCNC: 68 U/L (ref 39–117)
ALT SERPL W P-5'-P-CCNC: 15 U/L (ref 1–33)
ANION GAP SERPL CALCULATED.3IONS-SCNC: 22 MMOL/L (ref 5–15)
AST SERPL-CCNC: 23 U/L (ref 1–32)
BACTERIA UR QL AUTO: ABNORMAL /HPF
BASOPHILS # BLD AUTO: 0.02 10*3/MM3 (ref 0–0.2)
BASOPHILS NFR BLD AUTO: 0.1 % (ref 0–1.5)
BILIRUB SERPL-MCNC: 0.3 MG/DL (ref 0.2–1.2)
BILIRUB UR QL STRIP: NEGATIVE
BLD GP AB SCN SERPL QL: NEGATIVE
BUN BLD-MCNC: 49 MG/DL (ref 8–23)
BUN/CREAT SERPL: 47.1 (ref 7–25)
CALCIUM SPEC-SCNC: 9.1 MG/DL (ref 8.6–10.5)
CHLORIDE SERPL-SCNC: 103 MMOL/L (ref 98–107)
CLARITY UR: CLEAR
CO2 SERPL-SCNC: 15 MMOL/L (ref 22–29)
COLOR UR: YELLOW
CREAT BLD-MCNC: 1.04 MG/DL (ref 0.57–1)
D-LACTATE SERPL-SCNC: 2.3 MMOL/L (ref 0.5–2)
DEPRECATED RDW RBC AUTO: 78.2 FL (ref 37–54)
EOSINOPHIL # BLD AUTO: 0 10*3/MM3 (ref 0–0.4)
EOSINOPHIL NFR BLD AUTO: 0 % (ref 0.3–6.2)
ERYTHROCYTE [DISTWIDTH] IN BLOOD BY AUTOMATED COUNT: 20.1 % (ref 12.3–15.4)
GFR SERPL CREATININE-BSD FRML MDRD: 51 ML/MIN/1.73
GLOBULIN UR ELPH-MCNC: 1.8 GM/DL
GLUCOSE BLD-MCNC: 200 MG/DL (ref 65–99)
GLUCOSE UR STRIP-MCNC: NEGATIVE MG/DL
HCT VFR BLD AUTO: 20.8 % (ref 34–46.6)
HGB BLD-MCNC: 6.3 G/DL (ref 12–15.9)
HGB UR QL STRIP.AUTO: NEGATIVE
HYALINE CASTS UR QL AUTO: ABNORMAL /LPF
IMM GRANULOCYTES # BLD AUTO: 0.47 10*3/MM3 (ref 0–0.05)
IMM GRANULOCYTES NFR BLD AUTO: 2.6 % (ref 0–0.5)
KETONES UR QL STRIP: NEGATIVE
LEUKOCYTE ESTERASE UR QL STRIP.AUTO: ABNORMAL
LYMPHOCYTES # BLD AUTO: 0.74 10*3/MM3 (ref 0.7–3.1)
LYMPHOCYTES NFR BLD AUTO: 4.1 % (ref 19.6–45.3)
MACROCYTES BLD QL SMEAR: NORMAL
MCH RBC QN AUTO: 33.2 PG (ref 26.6–33)
MCHC RBC AUTO-ENTMCNC: 30.3 G/DL (ref 31.5–35.7)
MCV RBC AUTO: 109.5 FL (ref 79–97)
MONOCYTES # BLD AUTO: 0.81 10*3/MM3 (ref 0.1–0.9)
MONOCYTES NFR BLD AUTO: 4.5 % (ref 5–12)
NEUTROPHILS # BLD AUTO: 15.8 10*3/MM3 (ref 1.7–7)
NEUTROPHILS NFR BLD AUTO: 88.7 % (ref 42.7–76)
NITRITE UR QL STRIP: NEGATIVE
NRBC BLD AUTO-RTO: 0 /100 WBC (ref 0–0.2)
PH UR STRIP.AUTO: <=5 [PH] (ref 5–8)
PLAT MORPH BLD: NORMAL
PLATELET # BLD AUTO: 236 10*3/MM3 (ref 140–450)
PMV BLD AUTO: 10.3 FL (ref 6–12)
POTASSIUM BLD-SCNC: 4.2 MMOL/L (ref 3.5–5.2)
PROT SERPL-MCNC: 5.4 G/DL (ref 6–8.5)
PROT UR QL STRIP: NEGATIVE
RBC # BLD AUTO: 1.9 10*6/MM3 (ref 3.77–5.28)
RBC # UR: ABNORMAL /HPF
REF LAB TEST METHOD: ABNORMAL
RH BLD: POSITIVE
SODIUM BLD-SCNC: 140 MMOL/L (ref 136–145)
SP GR UR STRIP: 1.02 (ref 1–1.03)
SQUAMOUS #/AREA URNS HPF: ABNORMAL /HPF
T&S EXPIRATION DATE: NORMAL
UROBILINOGEN UR QL STRIP: ABNORMAL
WBC MORPH BLD: NORMAL
WBC NRBC COR # BLD: 17.84 10*3/MM3 (ref 3.4–10.8)
WBC UR QL AUTO: ABNORMAL /HPF

## 2020-02-19 PROCEDURE — 25010000002 ONDANSETRON PER 1 MG: Performed by: OBSTETRICS & GYNECOLOGY

## 2020-02-19 PROCEDURE — P9016 RBC LEUKOCYTES REDUCED: HCPCS

## 2020-02-19 PROCEDURE — 25010000002 VANCOMYCIN PER 500 MG: Performed by: OBSTETRICS & GYNECOLOGY

## 2020-02-19 PROCEDURE — 71045 X-RAY EXAM CHEST 1 VIEW: CPT

## 2020-02-19 PROCEDURE — 25010000002 ONDANSETRON PER 1 MG: Performed by: INTERNAL MEDICINE

## 2020-02-19 PROCEDURE — 25010000002 PROMETHAZINE PER 50 MG: Performed by: INTERNAL MEDICINE

## 2020-02-19 PROCEDURE — 25010000002 PROMETHAZINE PER 50 MG: Performed by: OBSTETRICS & GYNECOLOGY

## 2020-02-19 PROCEDURE — 86900 BLOOD TYPING SEROLOGIC ABO: CPT | Performed by: OBSTETRICS & GYNECOLOGY

## 2020-02-19 PROCEDURE — 86901 BLOOD TYPING SEROLOGIC RH(D): CPT | Performed by: OBSTETRICS & GYNECOLOGY

## 2020-02-19 PROCEDURE — 86923 COMPATIBILITY TEST ELECTRIC: CPT

## 2020-02-19 PROCEDURE — 99223 1ST HOSP IP/OBS HIGH 75: CPT | Performed by: OBSTETRICS & GYNECOLOGY

## 2020-02-19 PROCEDURE — 86900 BLOOD TYPING SEROLOGIC ABO: CPT

## 2020-02-19 PROCEDURE — 87040 BLOOD CULTURE FOR BACTERIA: CPT | Performed by: OBSTETRICS & GYNECOLOGY

## 2020-02-19 PROCEDURE — G0378 HOSPITAL OBSERVATION PER HR: HCPCS

## 2020-02-19 PROCEDURE — 85025 COMPLETE CBC W/AUTO DIFF WBC: CPT | Performed by: OBSTETRICS & GYNECOLOGY

## 2020-02-19 PROCEDURE — 36430 TRANSFUSION BLD/BLD COMPNT: CPT

## 2020-02-19 PROCEDURE — 86850 RBC ANTIBODY SCREEN: CPT | Performed by: OBSTETRICS & GYNECOLOGY

## 2020-02-19 PROCEDURE — 25010000002 PIPERACILLIN SOD-TAZOBACTAM PER 1 G: Performed by: OBSTETRICS & GYNECOLOGY

## 2020-02-19 PROCEDURE — 80053 COMPREHEN METABOLIC PANEL: CPT | Performed by: OBSTETRICS & GYNECOLOGY

## 2020-02-19 PROCEDURE — 30233N0 TRANSFUSION OF AUTOLOGOUS RED BLOOD CELLS INTO PERIPHERAL VEIN, PERCUTANEOUS APPROACH: ICD-10-PCS | Performed by: OBSTETRICS & GYNECOLOGY

## 2020-02-19 PROCEDURE — 81001 URINALYSIS AUTO W/SCOPE: CPT | Performed by: OBSTETRICS & GYNECOLOGY

## 2020-02-19 PROCEDURE — 87086 URINE CULTURE/COLONY COUNT: CPT | Performed by: OBSTETRICS & GYNECOLOGY

## 2020-02-19 PROCEDURE — 85007 BL SMEAR W/DIFF WBC COUNT: CPT | Performed by: OBSTETRICS & GYNECOLOGY

## 2020-02-19 PROCEDURE — 83605 ASSAY OF LACTIC ACID: CPT | Performed by: OBSTETRICS & GYNECOLOGY

## 2020-02-19 RX ORDER — VANCOMYCIN HYDROCHLORIDE 1 G/200ML
20 INJECTION, SOLUTION INTRAVENOUS ONCE
Status: COMPLETED | OUTPATIENT
Start: 2020-02-19 | End: 2020-02-19

## 2020-02-19 RX ORDER — PROMETHAZINE HYDROCHLORIDE 25 MG/ML
12.5 INJECTION, SOLUTION INTRAMUSCULAR; INTRAVENOUS EVERY 6 HOURS PRN
Status: DISCONTINUED | OUTPATIENT
Start: 2020-02-19 | End: 2020-02-23 | Stop reason: HOSPADM

## 2020-02-19 RX ORDER — FAMOTIDINE 10 MG/ML
20 INJECTION, SOLUTION INTRAVENOUS DAILY
Status: DISCONTINUED | OUTPATIENT
Start: 2020-02-19 | End: 2020-02-20

## 2020-02-19 RX ORDER — CHOLECALCIFEROL (VITAMIN D3) 125 MCG
5 CAPSULE ORAL NIGHTLY PRN
Status: DISCONTINUED | OUTPATIENT
Start: 2020-02-19 | End: 2020-02-23 | Stop reason: HOSPADM

## 2020-02-19 RX ORDER — ACETAMINOPHEN 325 MG/1
650 TABLET ORAL EVERY 6 HOURS PRN
Status: DISCONTINUED | OUTPATIENT
Start: 2020-02-19 | End: 2020-02-23 | Stop reason: HOSPADM

## 2020-02-19 RX ORDER — ONDANSETRON 4 MG/1
8 TABLET, ORALLY DISINTEGRATING ORAL EVERY 6 HOURS PRN
Status: DISCONTINUED | OUTPATIENT
Start: 2020-02-19 | End: 2020-02-23 | Stop reason: HOSPADM

## 2020-02-19 RX ORDER — HYDROXYZINE HYDROCHLORIDE 25 MG/1
25 TABLET, FILM COATED ORAL NIGHTLY PRN
Status: DISCONTINUED | OUTPATIENT
Start: 2020-02-19 | End: 2020-02-23 | Stop reason: HOSPADM

## 2020-02-19 RX ORDER — ATORVASTATIN CALCIUM 10 MG/1
10 TABLET, FILM COATED ORAL DAILY
Status: DISCONTINUED | OUTPATIENT
Start: 2020-02-19 | End: 2020-02-23 | Stop reason: HOSPADM

## 2020-02-19 RX ORDER — SODIUM CHLORIDE, SODIUM LACTATE, POTASSIUM CHLORIDE, CALCIUM CHLORIDE 600; 310; 30; 20 MG/100ML; MG/100ML; MG/100ML; MG/100ML
75 INJECTION, SOLUTION INTRAVENOUS CONTINUOUS
Status: DISCONTINUED | OUTPATIENT
Start: 2020-02-19 | End: 2020-02-22

## 2020-02-19 RX ORDER — PROMETHAZINE HYDROCHLORIDE 12.5 MG/1
12.5 TABLET ORAL EVERY 6 HOURS PRN
Status: DISCONTINUED | OUTPATIENT
Start: 2020-02-19 | End: 2020-02-23 | Stop reason: HOSPADM

## 2020-02-19 RX ORDER — DONEPEZIL HYDROCHLORIDE 10 MG/1
10 TABLET, FILM COATED ORAL NIGHTLY
Status: DISCONTINUED | OUTPATIENT
Start: 2020-02-19 | End: 2020-02-23 | Stop reason: HOSPADM

## 2020-02-19 RX ORDER — IBUPROFEN 600 MG/1
600 TABLET ORAL EVERY 4 HOURS PRN
Status: DISCONTINUED | OUTPATIENT
Start: 2020-02-19 | End: 2020-02-20

## 2020-02-19 RX ADMIN — PROMETHAZINE HYDROCHLORIDE 12.5 MG: 25 INJECTION INTRAMUSCULAR; INTRAVENOUS at 10:30

## 2020-02-19 RX ADMIN — FAMOTIDINE 20 MG: 10 INJECTION, SOLUTION INTRAVENOUS at 11:14

## 2020-02-19 RX ADMIN — SODIUM CHLORIDE, POTASSIUM CHLORIDE, SODIUM LACTATE AND CALCIUM CHLORIDE 125 ML/HR: 600; 310; 30; 20 INJECTION, SOLUTION INTRAVENOUS at 12:24

## 2020-02-19 RX ADMIN — ATORVASTATIN CALCIUM 10 MG: 10 TABLET, FILM COATED ORAL at 21:11

## 2020-02-19 RX ADMIN — DONEPEZIL HYDROCHLORIDE 10 MG: 10 TABLET, FILM COATED ORAL at 21:11

## 2020-02-19 RX ADMIN — VANCOMYCIN HYDROCHLORIDE 1000 MG: 1 INJECTION, SOLUTION INTRAVENOUS at 21:12

## 2020-02-19 RX ADMIN — SODIUM CHLORIDE, POTASSIUM CHLORIDE, SODIUM LACTATE AND CALCIUM CHLORIDE 1000 ML: 600; 310; 30; 20 INJECTION, SOLUTION INTRAVENOUS at 10:34

## 2020-02-19 RX ADMIN — TAZOBACTAM SODIUM AND PIPERACILLIN SODIUM 3.38 G: 375; 3 INJECTION, SOLUTION INTRAVENOUS at 21:12

## 2020-02-19 RX ADMIN — ONDANSETRON 8 MG: 2 INJECTION INTRAMUSCULAR; INTRAVENOUS at 12:47

## 2020-02-19 NOTE — TELEPHONE ENCOUNTER
Called and spoke with pt's .  Pt is weak, short of air, and has had emesis and diarrhea.  Dr. Claudio informed and pt advised to bring pt to our office now.  He verbalized understanding.

## 2020-02-19 NOTE — H&P
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 female who presents today for treatment and evaluation of the above issues.   Patient presents today complaining of fatigue, shakiness, weakness, and poor p.o. intake. She had a cup of soup last night and vomited. She is dry heaving in clinic this morning with ice chips and is pretty much been this way since the past 36 hours. She complains of chills and shortness of breath. She denies cough and dysuria. She notes frequent bowel movements and has had to sit so far today. She had bright red blood in the toilet after her bowel movements yesterday and today. She has abdominal pelvic pain with standing.   Oncologic History:           Endometrial cancer (CMS/HCC)    9/18/2019 Initial Diagnosis     Endometrial biopsy by Dr. Lam due to recurrent postmenopausal bleeding. Pathology showed cytologic atypia in background of extensive tumor necrosis.  =36.6. Referred to Gyn Oncology     9/18/2019 Imaging     CT abd/pelvis IMPRESSION:   1. Approximately 7 cm central uterine mass, strongly vascular along its   posterior lateral right margin at approximately 7:00 as noted above. No   evidence of invasion into the surrounding soft tissues.   2. No evidence of adenopathy or other metastatic disease.   3. 2 cm nonenhancing water density right ovarian cyst.   4. Incidentally noted small hepatic cyst and normal variant bilateral   renal parapelvic cysts.   Pre-op CXR ARIS     9/27/2019 Surgery     Exploratory laparotomy, Type 1 radical hysterectomy, BSO, pelvic lymph node dissection, omentectomy, cystoscopy with bilateral temporary ureteral stents.   Final pathology revealed carcinosarcoma, multiple foci measuring up to 7.8 cm, filling entire endometrial cavity, with  minimal myometrial invasion (up to 1/11 mm, 9% invasion). 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 Sarcomatous component is undifferentiated. Cytology negative, no LVSI, nodes negative. Stage IA      Chemotherapy     OP UTERINE PACLitaxel / CARBOplatin (Q21D)   Dose reduction related to neutropenia.     11/19/2019 - 12/5/2019 Radiation     Radiation OncologyTreatment Course: Yin Siddiqui received 3000 cGy in 5 fractions to vagina via High Dose Radiation - HDR.     12/11/2019 -  Chemotherapy     OP FILGRASTIM (NEUPOGEN)           Past Medical History:   Diagnosis Date   • Anemia    • Degenerative disc disease, lumbar     hands and knees    • Dyslipidemia    • Endometrial cancer (CMS/HCC) 9/27/2019   • GERD (gastroesophageal reflux disease)    • Hyperlipidemia    • Menopause    • OAB (overactive bladder)    • Osteopenia    • PMB (postmenopausal bleeding)    • Skin cancer    • Urge incontinence    • Vaginal atrophy    • Wears dentures     upper   • Wears reading eyeglasses            Past Surgical History:   Procedure Laterality Date   • CATARACT EXTRACTION W/ INTRAOCULAR LENS IMPLANT, BILATERAL     • COLONOSCOPY  2019   • CYST REMOVAL      left wrist   • CYSTOSCOPY W/ URETERAL STENT PLACEMENT Bilateral 9/27/2019    Procedure: CYSTOSCOPY WITH BILATERAL TEMPORARY URETERAL STENT INSERTION; Surgeon: Dasha Claudio MD; Location:  [facility]; Service: Gynecology   • D&C HYSTEROSCOPY     • EXPLORATORY LAPAROTOMY, TOTAL ABDOMINAL HYSTERECTOMY, SALPINGO OOPHORECTOMY WITH TUMOR DEBULKING Bilateral 9/27/2019    Procedure: EXPLORATORY LAPAROTOMY TYPE 1, RADICAL TOTAL ABDOMINAL HYSTERECTOMY, BILATERAL SALPINGO OOPHORECTOMY, PELVIC LYMPH NODE DISSECTION, OMENTUMECTOMY; Surgeon: Dasha Claudio MD; Location:  [facility]; Service: Gynecology   • EYE SURGERY Left 09/05/2019    macular hole    • EYE SURGERY      as a child for muscular problems x 3   • HAND SURGERY Right     bone removed    • TONSILLECTOMY        MEDICATIONS: The current medication list was reviewed with the patient and updated in the EMR this date per the Medical Assistant. Medication dosages and frequencies were confirmed to be accurate.   Allergies: has No Known Allergies.   Social History:   Social History           Socioeconomic History   • Marital status:      Spouse name: Not on file   • Number of children: Not on file   • Years of education: Not on file   • Highest education level: Not on file   Tobacco Use   • Smoking status: Never Smoker   • Smokeless tobacco: Never Used   Substance and Sexual Activity   • Alcohol use: No   • Drug use: No   • Sexual activity: Defer     Partners: Male     Birth control/protection: Post-menopausal     Family History:         Family History   Problem Relation Age of Onset   • Breast cancer Mother 78   • Ovarian cancer Neg Hx      Health Maintenance:        Health Maintenance   Topic Date Due   • TDAP/TD VACCINES (1 - Tdap) 10/03/1953   • Pneumococcal Vaccine Once at 65 Years Old  10/03/2007   • MEDICARE ANNUAL WELLNESS  04/10/2017   • DXA SCAN  05/23/2019   • ZOSTER VACCINE (2 of 2) 08/12/2019   • LIPID PANEL  09/26/2019   • MAMMOGRAM  05/15/2021   • COLONOSCOPY  04/16/2029   • INFLUENZA VACCINE  Completed     Review of Systems   Constitutional: Positive for chills and fatigue. Negative for fever and unexpected weight change.   HENT: Negative for ear pain and rhinorrhea.   Eyes: Positive for visual disturbance. Negative for pain and redness.   Respiratory: Positive for shortness of breath. Negative for cough.   Cardiovascular: Negative for chest pain and leg swelling.   Gastrointestinal: Positive for abdominal pain. Negative for constipation and diarrhea.   Endocrine: Negative for polydipsia and polyphagia.   Genitourinary: Positive for pelvic pain. Negative for flank pain and vaginal bleeding.   Musculoskeletal: Positive for arthralgias. Negative for myalgias.   Skin: Negative for pallor and rash.    Allergic/Immunologic: Negative for food allergies and immunocompromised state.   Neurological: Positive for dizziness and weakness. Negative for syncope and light-headedness.   Hematological: Negative for adenopathy. Does not bruise/bleed easily.   Psychiatric/Behavioral: Negative for dysphoric mood and suicidal ideas.     Physical Exam       Vitals:    02/19/20 0902   BP: (!) 87/40   Pulse: 107   Resp: 16   Temp: 96.9 °F (36.1 °C)   TempSrc: Temporal   SpO2: 98%   Weight: 52.6 kg (116 lb)     Body mass index is 20.55 kg/m².       Wt Readings from Last 3 Encounters:   02/19/20 52.6 kg (116 lb)   02/10/20 52.6 kg (116 lb)   01/29/20 52.8 kg (116 lb 8 oz)     GENERAL: Alert, thin appearing female appearing her stated age who is actively vomiting through part of today's evaluation.   HEENT: Sclera anicteric. Head normocephalic, atraumatic. Dry mucous membranes, alopecia.   NECK: Trachea midline, supple, without masses. No thyromegaly.   BREASTS: Deferred   CARDIOVASCULAR: Normal rate, regular rhythm, no murmurs, rubs, or gallops. No peripheral edema.   RESPIRATORY: Clear to auscultation bilaterally, normal respiratory effort   BACK: No CVA tenderness, no vertebral tenderness on palpation   GASTROINTESTINAL: Abdomen is soft,non-distended, no rebound or guarding, no masses, or hernias. No HSM. Mild tenderness at mid abdomen.   SKIN: Warm, dry, well-perfused. All visible areas intact. No rashes, lesions, ulcers.   PSYCHIATRIC: AO x3, with appropriate affect, normal thought processes.   NEUROLOGIC: No focal deficits. Moves extremities well.   MUSCULOSKELETAL: Normal gait and station.   EXTREMITIES: No cyanosis, clubbing, symmetric. PICC RUE.   LYMPHATICS: No inguinal or cervical adenopathy appreciated   PELVIC exam:   GYNECOLOGIC: Deferred   ECOG PS 0   PROCEDURES: none   Diagnostic Data:         Lab Results   Component Value Date    WBC 6.48 02/10/2020    HGB 10.5 (L) 02/10/2020    HCT 32.6 (L) 02/10/2020    .8  (H) 02/10/2020     (L) 02/10/2020    NEUTROABS 4.44 02/10/2020    GLUCOSE 97 01/29/2020    BUN 20 01/29/2020    CREATININE 0.80 01/29/2020    EGFRIFNONA 75 01/29/2020     01/29/2020    K 4.4 01/29/2020     01/29/2020    CO2 24.0 01/29/2020    MG 1.8 12/03/2019    CALCIUM 9.7 01/29/2020    ALBUMIN 4.40 01/29/2020    AST 23 01/29/2020    ALT 16 01/29/2020    BILITOT 0.4 01/29/2020            Lab Results    Component Value Date      8.1 01/29/2020      8.9 01/08/2020      9.6 12/18/2019      9.9 11/27/2019      36.6 09/15/2019        Assessment/Plan   This is a 79 y.o. woman who presents for toxicity evaluation while on chemotherapy for carcinosarcoma of the uterus.   Uterine carcinosarcoma   - Cycle #6 of 6 planned due today pending laboratory evaluation.   - PICC RUE   -She was scheduled for chemotherapy, but is quite ill today.   Chemotherapy induced neutropenia   - Neulasta OBI, EMLA cream to decrease discomfort with application   Chemotherapy-induced neuropathy   -Improved with IV fluids which is not typical. However, patient is active and symptoms are manageable. No neuropathy today.   Dehydration, hypotension, nausea, vomiting, diarrhea, chills, shortness of breath   -Hospital admission for IV fluids. We will further evaluate shortness of breath after she has received IV fluids. She may need CT scan as well to exclude progression on treatment. Will proceed with symptom management including antiemetics. Will evaluate stool for ova and parasite, C. difficile. I would anticipate that she would be in the hospital a minimum of 1 days.   Pain assessment was performed today as a part of patient’s care. For patients with pain related to surgery, gynecologic malignancy or cancer treatment, the plan is as noted in the assessment/plan. For patients with pain not related to these issues, they are to seek any further needed care from a more appropriate provider, such as PCP.   No  orders of the defined types were placed in this encounter.     I saw and evaluated the patient. I agree with the findings and the plan of care as documented in the note.    Dasha Claudio MD  02/19/20  9:57 AM    Due to decreased blood pressure (87/40) at the time of presentation, elevated heart rate (107), and elevated white cell count (17,000), patient meets the criteria for sepsis, unknown etiology.  In addition she is significantly anemic and this could have been contributing to her elevated heart rate.  There is concern for GI bleed as the degree of anemia is not consistent for what would be anticipated related to patient's chemotherapy treatment.  Further, she noted bright red blood per rectum and multiple episodes of diarrhea.  Gastrointestinal consult was ordered.  Sepsis bundle was completed.

## 2020-02-19 NOTE — PLAN OF CARE
"  Problem: Patient Care Overview  Goal: Plan of Care Review  Outcome: Ongoing (interventions implemented as appropriate)  Flowsheets (Taken 2/19/2020 7631)  Outcome Summary: Pt presented with weakness and frequent nausea and vomiting.  patient has had 1L fluid bolus and blood has been started.  pt now resting, getting up and down to the bathroom better and states she feels \"much better.\"  Pt has some anxiety and is concerned and worried about delaying her treatment.  told her we can talk with Dr. Claudio about that when she gets here.  Goal: Individualization and Mutuality  Outcome: Ongoing (interventions implemented as appropriate)  Goal: Discharge Needs Assessment  Outcome: Ongoing (interventions implemented as appropriate)  Goal: Interprofessional Rounds/Family Conf  Outcome: Ongoing (interventions implemented as appropriate)     Problem: Fluid Volume Deficit (Adult)  Goal: Identify Related Risk Factors and Signs and Symptoms  Outcome: Ongoing (interventions implemented as appropriate)  Goal: Optimal Fluid Balance  Outcome: Ongoing (interventions implemented as appropriate)     "

## 2020-02-20 ENCOUNTER — ANESTHESIA EVENT (OUTPATIENT)
Dept: GASTROENTEROLOGY | Facility: HOSPITAL | Age: 80
End: 2020-02-20

## 2020-02-20 ENCOUNTER — ANESTHESIA (OUTPATIENT)
Dept: GASTROENTEROLOGY | Facility: HOSPITAL | Age: 80
End: 2020-02-20

## 2020-02-20 PROBLEM — T45.1X5A CHEMOTHERAPY-INDUCED NEUROPATHY (HCC): Status: ACTIVE | Noted: 2020-02-20

## 2020-02-20 PROBLEM — D62 ACUTE BLOOD LOSS ANEMIA: Status: ACTIVE | Noted: 2020-02-20

## 2020-02-20 PROBLEM — G62.0 CHEMOTHERAPY-INDUCED NEUROPATHY (HCC): Status: ACTIVE | Noted: 2020-02-20

## 2020-02-20 PROBLEM — K92.2 GASTROINTESTINAL BLEEDING, LOWER: Status: ACTIVE | Noted: 2020-02-20

## 2020-02-20 LAB
ABO + RH BLD: NORMAL
ABO + RH BLD: NORMAL
ADV 40+41 DNA STL QL NAA+NON-PROBE: NOT DETECTED
ANION GAP SERPL CALCULATED.3IONS-SCNC: 14 MMOL/L (ref 5–15)
ASTRO TYP 1-8 RNA STL QL NAA+NON-PROBE: NOT DETECTED
BH BB BLOOD EXPIRATION DATE: NORMAL
BH BB BLOOD EXPIRATION DATE: NORMAL
BH BB BLOOD TYPE BARCODE: 9500
BH BB BLOOD TYPE BARCODE: 9500
BH BB DISPENSE STATUS: NORMAL
BH BB DISPENSE STATUS: NORMAL
BH BB PRODUCT CODE: NORMAL
BH BB PRODUCT CODE: NORMAL
BH BB UNIT NUMBER: NORMAL
BH BB UNIT NUMBER: NORMAL
BUN BLD-MCNC: 54 MG/DL (ref 8–23)
BUN/CREAT SERPL: 58.7 (ref 7–25)
C CAYETANENSIS DNA STL QL NAA+NON-PROBE: NOT DETECTED
C DIFF TOX GENS STL QL NAA+PROBE: NOT DETECTED
CA-I SERPL ISE-MCNC: 1.15 MMOL/L (ref 1.12–1.32)
CALCIUM SPEC-SCNC: 7.2 MG/DL (ref 8.6–10.5)
CAMPY SP DNA.DIARRHEA STL QL NAA+PROBE: NOT DETECTED
CHLORIDE SERPL-SCNC: 107 MMOL/L (ref 98–107)
CO2 SERPL-SCNC: 17 MMOL/L (ref 22–29)
CREAT BLD-MCNC: 0.92 MG/DL (ref 0.57–1)
CRYPTOSP STL CULT: NOT DETECTED
D-LACTATE SERPL-SCNC: 7.9 MMOL/L (ref 0.5–2)
DEPRECATED RDW RBC AUTO: 64.7 FL (ref 37–54)
DEPRECATED RDW RBC AUTO: 65 FL (ref 37–54)
E COLI DNA SPEC QL NAA+PROBE: NOT DETECTED
E HISTOLYT AG STL-ACNC: NOT DETECTED
EAEC PAA PLAS AGGR+AATA ST NAA+NON-PRB: NOT DETECTED
EC STX1 + STX2 GENES STL NAA+PROBE: NOT DETECTED
EPEC EAE GENE STL QL NAA+NON-PROBE: NOT DETECTED
ERYTHROCYTE [DISTWIDTH] IN BLOOD BY AUTOMATED COUNT: 20.2 % (ref 12.3–15.4)
ERYTHROCYTE [DISTWIDTH] IN BLOOD BY AUTOMATED COUNT: 20.3 % (ref 12.3–15.4)
ETEC LTA+ST1A+ST1B TOX ST NAA+NON-PROBE: NOT DETECTED
G LAMBLIA DNA SPEC QL NAA+PROBE: NOT DETECTED
GFR SERPL CREATININE-BSD FRML MDRD: 59 ML/MIN/1.73
GLUCOSE BLD-MCNC: 212 MG/DL (ref 65–99)
HCT VFR BLD AUTO: 14.3 % (ref 34–46.6)
HCT VFR BLD AUTO: 16.7 % (ref 34–46.6)
HCT VFR BLD AUTO: 26.5 % (ref 34–46.6)
HCT VFR BLD AUTO: 27 % (ref 34–46.6)
HGB BLD-MCNC: 4.7 G/DL (ref 12–15.9)
HGB BLD-MCNC: 5.4 G/DL (ref 12–15.9)
HGB BLD-MCNC: 9.1 G/DL (ref 12–15.9)
HGB BLD-MCNC: 9.2 G/DL (ref 12–15.9)
LACTATE HOLD SPECIMEN: NORMAL
MAGNESIUM SERPL-MCNC: 1.6 MG/DL (ref 1.6–2.4)
MCH RBC QN AUTO: 31.4 PG (ref 26.6–33)
MCH RBC QN AUTO: 32 PG (ref 26.6–33)
MCHC RBC AUTO-ENTMCNC: 32.3 G/DL (ref 31.5–35.7)
MCHC RBC AUTO-ENTMCNC: 32.9 G/DL (ref 31.5–35.7)
MCV RBC AUTO: 97.1 FL (ref 79–97)
MCV RBC AUTO: 97.3 FL (ref 79–97)
NOROVIRUS GI+II RNA STL QL NAA+NON-PROBE: NOT DETECTED
P SHIGELLOIDES DNA STL QL NAA+PROBE: NOT DETECTED
PLATELET # BLD AUTO: 122 10*3/MM3 (ref 140–450)
PLATELET # BLD AUTO: 140 10*3/MM3 (ref 140–450)
PMV BLD AUTO: 10.4 FL (ref 6–12)
PMV BLD AUTO: 10.5 FL (ref 6–12)
POTASSIUM BLD-SCNC: 4.7 MMOL/L (ref 3.5–5.2)
RBC # BLD AUTO: 1.47 10*6/MM3 (ref 3.77–5.28)
RBC # BLD AUTO: 1.72 10*6/MM3 (ref 3.77–5.28)
RV RNA STL NAA+PROBE: NOT DETECTED
SALMONELLA DNA SPEC QL NAA+PROBE: NOT DETECTED
SAPO I+II+IV+V RNA STL QL NAA+NON-PROBE: NOT DETECTED
SHIGELLA SP+EIEC IPAH STL QL NAA+PROBE: NOT DETECTED
SODIUM BLD-SCNC: 138 MMOL/L (ref 136–145)
UNIT  ABO: NORMAL
UNIT  ABO: NORMAL
UNIT  RH: NORMAL
UNIT  RH: NORMAL
V CHOLERAE DNA SPEC QL NAA+PROBE: NOT DETECTED
VIBRIO DNA SPEC NAA+PROBE: NOT DETECTED
WBC NRBC COR # BLD: 15.36 10*3/MM3 (ref 3.4–10.8)
WBC NRBC COR # BLD: 15.43 10*3/MM3 (ref 3.4–10.8)
YERSINIA STL CULT: NOT DETECTED

## 2020-02-20 PROCEDURE — 99233 SBSQ HOSP IP/OBS HIGH 50: CPT | Performed by: OBSTETRICS & GYNECOLOGY

## 2020-02-20 PROCEDURE — 86900 BLOOD TYPING SEROLOGIC ABO: CPT

## 2020-02-20 PROCEDURE — 85014 HEMATOCRIT: CPT | Performed by: INTERNAL MEDICINE

## 2020-02-20 PROCEDURE — 80048 BASIC METABOLIC PNL TOTAL CA: CPT | Performed by: OBSTETRICS & GYNECOLOGY

## 2020-02-20 PROCEDURE — 63710000001 PROMETHAZINE PER 12.5 MG: Performed by: OBSTETRICS & GYNECOLOGY

## 2020-02-20 PROCEDURE — 25010000002 PROPOFOL 10 MG/ML EMULSION: Performed by: NURSE ANESTHETIST, CERTIFIED REGISTERED

## 2020-02-20 PROCEDURE — 83605 ASSAY OF LACTIC ACID: CPT | Performed by: OBSTETRICS & GYNECOLOGY

## 2020-02-20 PROCEDURE — 85027 COMPLETE CBC AUTOMATED: CPT | Performed by: OBSTETRICS & GYNECOLOGY

## 2020-02-20 PROCEDURE — 25010000002 PIPERACILLIN SOD-TAZOBACTAM PER 1 G: Performed by: INTERNAL MEDICINE

## 2020-02-20 PROCEDURE — 87493 C DIFF AMPLIFIED PROBE: CPT | Performed by: OBSTETRICS & GYNECOLOGY

## 2020-02-20 PROCEDURE — 88305 TISSUE EXAM BY PATHOLOGIST: CPT | Performed by: INTERNAL MEDICINE

## 2020-02-20 PROCEDURE — 0DB78ZX EXCISION OF STOMACH, PYLORUS, VIA NATURAL OR ARTIFICIAL OPENING ENDOSCOPIC, DIAGNOSTIC: ICD-10-PCS | Performed by: OBSTETRICS & GYNECOLOGY

## 2020-02-20 PROCEDURE — 25010000002 PIPERACILLIN SOD-TAZOBACTAM PER 1 G: Performed by: OBSTETRICS & GYNECOLOGY

## 2020-02-20 PROCEDURE — 25010000002 ONDANSETRON PER 1 MG: Performed by: OBSTETRICS & GYNECOLOGY

## 2020-02-20 PROCEDURE — 63710000001 PROMETHAZINE PER 12.5 MG: Performed by: INTERNAL MEDICINE

## 2020-02-20 PROCEDURE — 82330 ASSAY OF CALCIUM: CPT | Performed by: OBSTETRICS & GYNECOLOGY

## 2020-02-20 PROCEDURE — 30233N1 TRANSFUSION OF NONAUTOLOGOUS RED BLOOD CELLS INTO PERIPHERAL VEIN, PERCUTANEOUS APPROACH: ICD-10-PCS | Performed by: OBSTETRICS & GYNECOLOGY

## 2020-02-20 PROCEDURE — 36430 TRANSFUSION BLD/BLD COMPNT: CPT

## 2020-02-20 PROCEDURE — 99232 SBSQ HOSP IP/OBS MODERATE 35: CPT | Performed by: INTERNAL MEDICINE

## 2020-02-20 PROCEDURE — 85018 HEMOGLOBIN: CPT | Performed by: INTERNAL MEDICINE

## 2020-02-20 PROCEDURE — 25010000002 ONDANSETRON PER 1 MG: Performed by: INTERNAL MEDICINE

## 2020-02-20 PROCEDURE — 99222 1ST HOSP IP/OBS MODERATE 55: CPT | Performed by: INTERNAL MEDICINE

## 2020-02-20 PROCEDURE — 83735 ASSAY OF MAGNESIUM: CPT | Performed by: OBSTETRICS & GYNECOLOGY

## 2020-02-20 PROCEDURE — 25010000003 LIDOCAINE 1 % SOLUTION: Performed by: NURSE ANESTHETIST, CERTIFIED REGISTERED

## 2020-02-20 PROCEDURE — 0097U HC BIOFIRE FILMARRAY GI PANEL: CPT | Performed by: OBSTETRICS & GYNECOLOGY

## 2020-02-20 PROCEDURE — P9016 RBC LEUKOCYTES REDUCED: HCPCS

## 2020-02-20 DEVICE — ST SYS OTSC CLIP 11/3T 165CM: Type: IMPLANTABLE DEVICE | Site: DUODENUM | Status: FUNCTIONAL

## 2020-02-20 RX ORDER — PROPOFOL 10 MG/ML
VIAL (ML) INTRAVENOUS AS NEEDED
Status: DISCONTINUED | OUTPATIENT
Start: 2020-02-20 | End: 2020-02-20 | Stop reason: SURG

## 2020-02-20 RX ORDER — PANTOPRAZOLE SODIUM 40 MG/10ML
20 INJECTION, POWDER, LYOPHILIZED, FOR SOLUTION INTRAVENOUS 2 TIMES DAILY
Status: DISCONTINUED | OUTPATIENT
Start: 2020-02-20 | End: 2020-02-20

## 2020-02-20 RX ORDER — PANTOPRAZOLE SODIUM 40 MG/1
40 TABLET, DELAYED RELEASE ORAL
Status: DISCONTINUED | OUTPATIENT
Start: 2020-02-20 | End: 2020-02-20

## 2020-02-20 RX ORDER — LIDOCAINE HYDROCHLORIDE 10 MG/ML
INJECTION, SOLUTION INFILTRATION; PERINEURAL AS NEEDED
Status: DISCONTINUED | OUTPATIENT
Start: 2020-02-20 | End: 2020-02-20 | Stop reason: SURG

## 2020-02-20 RX ORDER — PANTOPRAZOLE SODIUM 40 MG/10ML
40 INJECTION, POWDER, LYOPHILIZED, FOR SOLUTION INTRAVENOUS 2 TIMES DAILY
Status: DISCONTINUED | OUTPATIENT
Start: 2020-02-20 | End: 2020-02-20

## 2020-02-20 RX ORDER — PANTOPRAZOLE SODIUM 40 MG/10ML
40 INJECTION, POWDER, LYOPHILIZED, FOR SOLUTION INTRAVENOUS 2 TIMES DAILY
Status: DISCONTINUED | OUTPATIENT
Start: 2020-02-20 | End: 2020-02-23 | Stop reason: HOSPADM

## 2020-02-20 RX ORDER — PROPOFOL 10 MG/ML
VIAL (ML) INTRAVENOUS CONTINUOUS PRN
Status: DISCONTINUED | OUTPATIENT
Start: 2020-02-20 | End: 2020-02-20 | Stop reason: SURG

## 2020-02-20 RX ADMIN — ONDANSETRON 8 MG: 2 INJECTION INTRAMUSCULAR; INTRAVENOUS at 05:10

## 2020-02-20 RX ADMIN — TAZOBACTAM SODIUM AND PIPERACILLIN SODIUM 3.38 G: 375; 3 INJECTION, SOLUTION INTRAVENOUS at 12:32

## 2020-02-20 RX ADMIN — MELATONIN TAB 5 MG 5 MG: 5 TAB at 20:26

## 2020-02-20 RX ADMIN — TAZOBACTAM SODIUM AND PIPERACILLIN SODIUM 3.38 G: 375; 3 INJECTION, SOLUTION INTRAVENOUS at 20:16

## 2020-02-20 RX ADMIN — SODIUM CHLORIDE, POTASSIUM CHLORIDE, SODIUM LACTATE AND CALCIUM CHLORIDE 250 ML: 600; 310; 30; 20 INJECTION, SOLUTION INTRAVENOUS at 04:32

## 2020-02-20 RX ADMIN — TAZOBACTAM SODIUM AND PIPERACILLIN SODIUM 3.38 G: 375; 3 INJECTION, SOLUTION INTRAVENOUS at 03:51

## 2020-02-20 RX ADMIN — PROMETHAZINE HYDROCHLORIDE 12.5 MG: 12.5 TABLET ORAL at 07:42

## 2020-02-20 RX ADMIN — HYDROXYZINE HYDROCHLORIDE 25 MG: 25 TABLET, FILM COATED ORAL at 20:26

## 2020-02-20 RX ADMIN — DONEPEZIL HYDROCHLORIDE 10 MG: 10 TABLET, FILM COATED ORAL at 20:16

## 2020-02-20 RX ADMIN — PROPOFOL 100 MCG/KG/MIN: 10 INJECTION, EMULSION INTRAVENOUS at 13:55

## 2020-02-20 RX ADMIN — ACETAMINOPHEN 650 MG: 325 TABLET, FILM COATED ORAL at 15:42

## 2020-02-20 RX ADMIN — SODIUM CHLORIDE, POTASSIUM CHLORIDE, SODIUM LACTATE AND CALCIUM CHLORIDE: 600; 310; 30; 20 INJECTION, SOLUTION INTRAVENOUS at 13:55

## 2020-02-20 RX ADMIN — PANTOPRAZOLE SODIUM 20 MG: 40 INJECTION, POWDER, FOR SOLUTION INTRAVENOUS at 05:10

## 2020-02-20 RX ADMIN — PROPOFOL 100 MG: 10 INJECTION, EMULSION INTRAVENOUS at 13:55

## 2020-02-20 RX ADMIN — LIDOCAINE HYDROCHLORIDE 100 MG: 10 INJECTION, SOLUTION INFILTRATION; PERINEURAL at 13:55

## 2020-02-20 RX ADMIN — PANTOPRAZOLE SODIUM 40 MG: 40 INJECTION, POWDER, FOR SOLUTION INTRAVENOUS at 08:41

## 2020-02-20 RX ADMIN — PANTOPRAZOLE SODIUM 40 MG: 40 INJECTION, POWDER, FOR SOLUTION INTRAVENOUS at 20:16

## 2020-02-20 NOTE — PLAN OF CARE
Problem: Patient Care Overview  Goal: Plan of Care Review  Outcome: Ongoing (interventions implemented as appropriate)  Flowsheets (Taken 2/20/2020 1020)  Progress: declining  Plan of Care Reviewed With: patient;spouse  Outcome Summary: pt had large black bowel movement with large clots present. pt very faint and weak, back to bed cleaned up. new orders for pt , more blood to be given after critical labs called in. will monitor pt very closely for signs of distress

## 2020-02-20 NOTE — CONSULTS
[facility]    Referring Provider: Dasha Claudio MD    PCP: Manoj Brown MD    Reason for Consultation: GI bleed    Chief complaint: Weakness    History of present illness:    Yin Siddiqui is a 79 y.o. female who is admitted with 4-day history of weakness.  There is associated dark stools, dyspnea with exertion, and crampy abdominal discomfort..  There is associated nausea and hematemesis of coffee grounds.  Patient is found to have severe anemia with a hemoglobin of 5.4.  She was transfused 2 units packed red blood cells last night, however, hemoglobin this morning remains critically low at 4.7.  Additional blood has been ordered.  Recent hemoglobin 10 days ago was 10.5.  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  Patient takes naproxen as needed for arthritis.  She reports receiving corticosteroids with hemotherapy.  Has no prior history of peptic ulcer disease.    Allergies:  Patient has no known allergies.    Scheduled Meds:    atorvastatin 10 mg Oral Daily   donepezil 10 mg Oral Nightly   pantoprazole 40 mg Intravenous BID   piperacillin-tazobactam 3.375 g Intravenous Q8H   vancomycin 750 mg Intravenous Q24H        Infusions:    lactated ringers 125 mL/hr Last Rate: 125 mL/hr (02/19/20 1224)   Pharmacy to dose vancomycin         PRN Meds:  •  acetaminophen  •  hydrOXYzine  •  ibuprofen  •  melatonin  •  ondansetron  •  ondansetron ODT  •  [COMPLETED] vancomycin **AND** Pharmacy to dose vancomycin  •  promethazine **OR** promethazine    Home Meds:  Medications Prior to Admission   Medication Sig Dispense Refill Last Dose   • CALCIUM-VITAMIN D PO Take 1 tablet by mouth Daily.   Taking   • dexamethasone (DECADRON) 4 MG tablet Take 5 tabs the night before chemo then take 2 tabs in the morning on days 2, 3 & 4. Take with food. 11 tablet 5 Taking   • docusate sodium (COLACE) 100 MG capsule Take 2  capsules by mouth 2 (Two) Times a Day As Needed for Constipation. 60 capsule 2 Taking   • donepezil (ARICEPT) 10 MG tablet Take 10 mg by mouth Every Night.   Taking   • loratadine (CLARITIN) 10 MG tablet Take 1 tab the night before chemo then take 1 tab the morning of chemo. 12 tablet 1 Taking   • Multiple Vitamins-Minerals (MULTIVITAMIN ADULT PO) Take 1 tablet by mouth Daily.   Taking   • Omega-3 Fatty Acids (FISH OIL) 1000 MG capsule capsule Take 1,000 mg by mouth Daily With Breakfast.   Taking   • ondansetron (ZOFRAN) 4 MG tablet Take 1 tablet by mouth Every 6 (Six) Hours As Needed for Nausea or Vomiting. 10 tablet 0 Taking   • ondansetron (ZOFRAN) 8 MG tablet Take 1 tablet by mouth 3 (Three) Times a Day As Needed for Nausea or Vomiting. 30 tablet 5 Taking   • polyethylene glycol (MIRALAX) powder Take 17 g by mouth Daily.   Taking   • promethazine (PHENERGAN) 25 MG tablet Take 1 tablet by mouth Every 6 (Six) Hours As Needed for Nausea or Vomiting. 30 tablet 5 Taking   • simvastatin (ZOCOR) 10 MG tablet Take 10 mg by mouth every night.   Taking   • traMADol (ULTRAM) 50 MG tablet Take 1 tablet by mouth Every 6 (Six) Hours As Needed for Moderate Pain . 20 tablet 0 Taking   • Xylitol (XYLIMELTS) 550 MG disk Apply  to the mouth or throat Every Night.   Taking       ROS: Review of Systems   Constitutional: Positive for activity change. Negative for appetite change, chills, fever and unexpected weight change.   HENT: Negative.  Negative for mouth sores, nosebleeds and trouble swallowing.    Eyes: Negative.    Respiratory: Positive for shortness of breath. Negative for cough, choking, chest tightness, wheezing and stridor.    Cardiovascular: Negative.  Negative for chest pain, palpitations and leg swelling.   Gastrointestinal: Positive for abdominal pain, blood in stool, diarrhea, nausea and vomiting. Negative for abdominal distention.   Endocrine: Negative.    Genitourinary: Negative.  Negative for difficulty urinating  and dysuria.   Musculoskeletal: Positive for arthralgias. Negative for back pain.   Skin: Positive for pallor. Negative for color change and rash.   Allergic/Immunologic: Negative.    Neurological: Positive for weakness and light-headedness. Negative for tremors, seizures and syncope.   Hematological: Negative.  Negative for adenopathy. Does not bruise/bleed easily.   Psychiatric/Behavioral: Negative.  Negative for agitation and behavioral problems.       PAST MED HX:  Past Medical History:   Diagnosis Date   • Anemia    • Degenerative disc disease, lumbar     hands and knees    • Dyslipidemia    • Endometrial cancer (CMS/HCC) 9/27/2019   • GERD (gastroesophageal reflux disease)    • Hyperlipidemia    • Menopause    • OAB (overactive bladder)    • Osteopenia    • PMB (postmenopausal bleeding)    • Skin cancer    • Urge incontinence    • Vaginal atrophy    • Wears dentures     upper   • Wears reading eyeglasses        PAST SURG HX:  Past Surgical History:   Procedure Laterality Date   • CATARACT EXTRACTION W/ INTRAOCULAR LENS  IMPLANT, BILATERAL     • COLONOSCOPY  2019   • CYST REMOVAL      left wrist   • CYSTOSCOPY W/ URETERAL STENT PLACEMENT Bilateral 9/27/2019    Procedure: CYSTOSCOPY WITH BILATERAL TEMPORARY URETERAL STENT INSERTION;  Surgeon: Dasha Claudio MD;  Location:  [facility];  Service: Gynecology   • D&C HYSTEROSCOPY     • EXPLORATORY LAPAROTOMY, TOTAL ABDOMINAL HYSTERECTOMY, SALPINGO OOPHORECTOMY WITH TUMOR DEBULKING Bilateral 9/27/2019    Procedure: EXPLORATORY LAPAROTOMY TYPE 1, RADICAL TOTAL ABDOMINAL HYSTERECTOMY, BILATERAL SALPINGO OOPHORECTOMY, PELVIC LYMPH NODE DISSECTION, OMENTUMECTOMY;  Surgeon: Dasha Claudio MD;  Location:  [facility];  Service: Gynecology   • EYE SURGERY Left 09/05/2019    macular hole    • EYE SURGERY      as a child for muscular problems  x 3   • HAND SURGERY Right     bone removed    • TONSILLECTOMY         FAM HX:  Family History   Problem Relation Age of Onset   •  Breast cancer Mother 78   • Ovarian cancer Neg Hx        SOC HX:  Social History     Socioeconomic History   • Marital status:      Spouse name: Not on file   • Number of children: Not on file   • Years of education: Not on file   • Highest education level: Not on file   Tobacco Use   • Smoking status: Never Smoker   • Smokeless tobacco: Never Used   Substance and Sexual Activity   • Alcohol use: No   • Drug use: No   • Sexual activity: Defer     Partners: Male     Birth control/protection: Post-menopausal       PHYSICAL EXAM  /56 (BP Location: Left arm, Patient Position: Lying)   Pulse 95   Temp 98.4 °F (36.9 °C) (Oral)   Resp 16   SpO2 97%   Wt Readings from Last 3 Encounters:   02/19/20 52.6 kg (116 lb)   02/10/20 52.6 kg (116 lb)   01/29/20 52.8 kg (116 lb 8 oz)   ,body mass index is unknown because there is no height or weight on file.  Physical Exam   Constitutional: She is oriented to person, place, and time. She appears well-developed and well-nourished. No distress.   HENT:   Head: Normocephalic.   Mouth/Throat: No oropharyngeal exudate.   Eyes: Conjunctivae are normal. No scleral icterus.   Neck: Normal range of motion.   Cardiovascular: Normal rate and regular rhythm.   Pulmonary/Chest: Effort normal and breath sounds normal. No stridor. No respiratory distress. She has no wheezes. She has no rales.   Abdominal: Soft. She exhibits no distension and no mass. There is no tenderness. There is no guarding.   Bowel sounds are hyperactive.   Genitourinary:   Genitourinary Comments: Deferred   Musculoskeletal: Normal range of motion. She exhibits no edema.   Neurological: She is alert and oriented to person, place, and time.   Skin: Skin is warm and dry. Capillary refill takes less than 2 seconds. No rash noted. No erythema. There is pallor.   Psychiatric: She has a normal mood and affect. Her behavior is normal.   Nursing note and vitals reviewed.      Results Review:   I reviewed the patient's  new clinical results.    Lab Results   Component Value Date    WBC 15.36 (H) 02/20/2020    HGB 4.7 (C) 02/20/2020    HGB 5.4 (C) 02/20/2020    HGB 6.3 (C) 02/19/2020    HCT 14.3 (C) 02/20/2020    MCV 97.3 (H) 02/20/2020     (L) 02/20/2020       Lab Results   Component Value Date    INR 1.00 09/15/2019       Lab Results   Component Value Date    GLUCOSE 212 (H) 02/20/2020    BUN 54 (H) 02/20/2020    CREATININE 0.92 02/20/2020    EGFRIFNONA 59 (L) 02/20/2020    BCR 58.7 (H) 02/20/2020     02/20/2020    K 4.7 02/20/2020    CO2 17.0 (L) 02/20/2020    CALCIUM 7.2 (L) 02/20/2020    ALBUMIN 3.60 02/19/2020    ALKPHOS 68 02/19/2020    BILITOT 0.3 02/19/2020    ALT 15 02/19/2020    AST 23 02/19/2020       ASSESSMENTS/PLANS    Symptomatic acute blood loss anemia with weakness and dyspnea.  Suspect peptic ulcer.  Hemorrhagic shock, compensated.  Consumptive thrombocytopenia  Hematemesis of coffee grounds with nausea.  Melena.  Chronic intermittent NSAID use.     >> Increase Protonix to 40 mg IV twice daily.  >> Agree with transfer to ICU  >> Plan EGD today when adequately resuscitated.    I discussed the patients findings and my recommendations with patient, family and consulting provider    Mark I. Brunner, MD  02/20/20  8:22 AM

## 2020-02-20 NOTE — PROGRESS NOTES
Gynecologic Oncology   Daily Progress Note    Chief Complaint: anemia, diarrhea, weakness    Subjective   Patient initially did much better yesterday after fluid resuscitation and 2 units of blood.  She had a formed stool, was hungry, and denied nausea/vomiting when patient was evaluated at 5pm last night.  Then, overnight patient had large volume of dark brown-blackish stool with clots around 4am.  She became hypotensive at that time.  Her labs resulted with hct of 16 and she was given 250cc bolus and additional 2 units of pRBCs were ordered.  She then later had another large volume bloody stool. This morning patient reports feeling ill and overall unwell.  She reports nausea but denies vomiting.  She denies chest pain or shortness of breath.  We discussed transfer to ICU for closer monitoring.  answered patient's questions.    Objective   Temp:  [96.9 °F (36.1 °C)-99.9 °F (37.7 °C)] 98.4 °F (36.9 °C)  Heart Rate:  [] 95  Resp:  [16-18] 16  BP: ()/(40-59) 109/56  Vitals:    02/20/20 0700   BP: 109/56   Pulse: 95   Resp: 16   Temp: 98.4 °F (36.9 °C)   SpO2: 97%     I/O last 3 completed shifts:  In: 680 [Blood:680]  Out: 1275 [Urine:675; Stool:600]     GENERAL: Alert, ill-appearing female in no apparent distress.  Pale.  CARDIOVASCULAR: Normal rate, regular rhythm, no murmurs, rubs, or gallops.    RESPIRATORY: Clear to auscultation bilaterally, normal respiratory effort  GASTROINTESTINAL:  Soft, midly tender.  Midline incision scar.  GENITOURINARY: No giles.    SKIN:  Warm, dry, well-perfused.    PSYCHIATRIC: AO x3, with appropriate affect, normal thought processes  EXREMITIES: Symmetric. No peripheral edema.    Lab Results   Component Value Date    WBC 15.36 (H) 02/20/2020    HGB 4.7 (C) 02/20/2020    HCT 14.3 (C) 02/20/2020    MCV 97.3 (H) 02/20/2020     (L) 02/20/2020    NEUTROABS 15.80 (H) 02/19/2020    GLUCOSE 212 (H) 02/20/2020    BUN 54 (H) 02/20/2020    CREATININE 0.92 02/20/2020     EGFRIFNONA 59 (L) 02/20/2020     02/20/2020    K 4.7 02/20/2020     02/20/2020    CO2 17.0 (L) 02/20/2020    MG 1.6 02/20/2020    CALCIUM 7.2 (L) 02/20/2020     Assessment/Plan    This is a 79 y.o. admitted 2/19/2020 for dehydration, bloody stools, weakness.     Sepsis of unknown origin  -Hypotensive with leukocytosis of 18,000 on arrival  -Sepsis bundle ordered  -S/p 1 L bolus on arrival to hospital  -Empiric antibiotics started-Vanc and Zosyn  -Urine cx and blood cx pending  -CXR performed but no final read yet  -C. Diff and ova/parasite panel pending as pt had diarrhea  -Lactate 2.3-7.9  -Will likely need additional line on top of PICC -- will defer to ICU team.    Concern for GI bleed  -Hct 20 on arrival, S/p 2u pRBCs 2/19/2020.  This morning H/H 5.4/17.  Repeat H/H 4.7/14.3.  -Additional 2 units ordered 2/20/2020  -Patient with two large volume episodes of dark bloody stools overnight  -Continue protonix BID  -Gastroenterology consulted and will see first thing this morning    Uterine carcinosarcoma   -PICC RUE  -She was scheduled for cycle #6 chemotherapy 2/19/2020 but presented to the clinic and was ill with dehydration, hypotension, nausea, vomiting, diarrhea, chills, shortness of breath.  She was admitted directly from clinic.    Dispo: Patient being moved to ICU for higher level of care.           Sophie Pagan MD  02/20/20  7:42 AM    I saw and evaluated the patient. I agree with the findings and the plan of care as documented in the note.    I discussed this case with Dr. Oliva and Dr.Brunner.  Patient appropriate for ICU transfer.  I discussed this with the patient and her .  I discussed the possibility of central line placement.  Consistent with patient's baseline, she had questions about her care that were a little bit out of sync with her physicians concerns.  Advised patient and  that ICU care was necessary due to her ongoing GI bleed, need for higher level care, and  ongoing issues with hemodynamic instability.  Dr. Brunner was at the bedside when I saw the patient.    Dasha Claudio MD  02/20/20  8:41 AM    I discharged the patient is resting comfortably.  Her vital signs of normalized.  She is status post EGD with clip of bleeding duodenal ulcer.  We will continue ICU stay for tonight.  If she remains stable, she can be transferred to the floor tomorrow.  She has questions about chemotherapy and I told her we would wait to see how she is doing.  She and her family verbalized understanding.    Dasha Claudio MD  02/20/20  4:25 PM

## 2020-02-20 NOTE — PROGRESS NOTES
Pharmacy Consult-Vancomycin Dosing  Yin Siddiqui is a  79 y.o. female receiving vancomycin therapy.     Indication: Sepsis  Consulting Provider: Gyn Onc  ID Consult: No    Goal Trough: 15-20 mcg/mL    Current Antimicrobial Therapy  Vancomycin Day 1  Zosyn Day 1    Allergies  Allergies as of 02/19/2020    (No Known Allergies)       Labs    Results from last 7 days   Lab Units 02/19/20  1027   BUN mg/dL 49*   CREATININE mg/dL 1.04*       Results from last 7 days   Lab Units 02/19/20  1026   WBC 10*3/mm3 17.84*       Evaluation of Dosing     Last Dose Received in the ED/Outside Facility: No  Is Patient on Dialysis or Renal Replacement: No    Ht -    Wt -      Estimated Creatinine Clearance: 37.6 mL/min (A) (by C-G formula based on SCr of 1.04 mg/dL (H)).    Intake & Output (last 3 days)         02/17 0701 - 02/18 0700 02/18 0701 - 02/19 0700 02/19 0701 - 02/20 0700    Blood   360    Total Intake(mL/kg)   360 (6.8)    Urine (mL/kg/hr)   375    Emesis/NG output   0    Total Output   375    Net   -15           Emesis Unmeasured Occurrence   1 x            Microbiology and Radiology  Microbiology Results (last 10 days)       ** No results found for the last 240 hours. **            Evaluation of Level                           Assessment/Plan:  1. Pharmacy to dose vancomycin for sepsis.   2. Patient received a loading dose of vancomycin 1000mg IV x 1 today.  3. Will start on a maintenance dose of vancomycin 750mg (~15 mg/kg) IV Q24H.  4. Vancomycin ordered for 2 days. Will obtain a trough if vancomycin is to be continued or if there is a change in renal function.   5. Monitor renal function, cultures and sensitivities, and clinical status, and adjust regimen as necessary.  Pharmacy will continue to follow.    Katlin Capps, PharmD  2/19/2020  7:45 PM

## 2020-02-20 NOTE — PROGRESS NOTES
Multidisciplinary Rounds    Time: 20min  Patient Name: Yin Siddiqui  Date of Encounter: 02/20/20 1:39 PM  MRN: [id number]  Admission date: 2/19/2020      Reason for visit: MDR. RD to continue to follow per protocol.     Additional information obtained during MDR: Pt admitted yesterday (2/19) with hematemesis and melena. Planning for EGD today (2/20). Pt follows with outpatient oncology dietitians. Will complete nutrition assessment as appropriate.     Current diet: NPO Diet NPO Except: Ice Chips      Intervention:  Follow treatment plan  Care plan reviewed    Follow up:   Per protocol      Elza Bonilla MS RD/SELMA CNSC  1:39 PM

## 2020-02-20 NOTE — PROGRESS NOTES
Intensive Care Follow-up     Hospital:  LOS: 0 days   Ms. Yin Siddiqui, 79 y.o. female is followed for:   Gastrointestinal bleeding        Subjective   Interval History:    Yin Siddiqui is a 79 y.o. female with PMH Stage 1 Endometrial Ca and followed by Dr. Claudio.  She was diagnosed 9/2019 and underwent RAFA, BSO, pelvic LN dissection, omenectomy and bilateral ureteral stent placment 9/27/19.  She has had 5 out of 6 cycles of Carboplatin and Paclitaxel, last dose was 1/29/20.  She has also had cylinder brachytherapy 11/19/19 - 12/5/19.  She presented to clinic 2/19/20 with 3 day c/o chills, shortness of air, fatigue, weakness, poor po intake, dry heaving and diarrhea.  She developed BRBPR that morning and was admitted into the hospital.  Her initial H/H was 6.3/20.8, plts 236, BUN 49, Cr 1.04.  LA last night was 2.3, followed by 7.9.  H/H has continued to drop to 5.4/16.7 followed by 4.7/14.3. She has had 3 units PRBC so far:  2 units yesterday, 1 unit this morning.  She has one more ordered to give today.  She was moved to the ICU for closer monitoring due to her GI bleeding.  GI was consulted and the plan is EGD when adequately resuscitated.      She states that she is not currently having any pain.  She is a bit anxious about bleeding however otherwise she states she feels well.    The patient's past medical, surgical and social history were reviewed and updated in Epic as appropriate.        Objective     Infusions:    lactated ringers 125 mL/hr Last Rate: 125 mL/hr (02/19/20 1224)   Pharmacy to dose vancomycin       Medications:    atorvastatin 10 mg Oral Daily   donepezil 10 mg Oral Nightly   pantoprazole 40 mg Intravenous BID   piperacillin-tazobactam 3.375 g Intravenous Q8H   vancomycin 750 mg Intravenous Q24H       Vital Sign Min/Max for last 24 hours  Temp  Min: 96.9 °F (36.1 °C)  Max: 99.9 °F (37.7 °C)   BP  Min: 87/40  Max: 137/63   Pulse  Min: 80  Max: 107   Resp  Min: 16  Max: 18   SpO2   Min: 90 %  Max: 100 %   Flow (L/min)  Min: 1  Max: 2       Input/Output for last 24 hour shift  02/19 0701 - 02/20 0700  In: 680   Out: 1275 [Urine:675]      Objective:  General Appearance:  Comfortable, well-appearing and in no acute distress.    Vital signs: (most recent): Blood pressure 118/65, pulse 87, temperature 98.2 °F (36.8 °C), temperature source Oral, resp. rate 16, SpO2 97 %, not currently breastfeeding.    HEENT: Normal HEENT exam.    Lungs:  Normal effort and normal respiratory rate.  Breath sounds clear to auscultation.  She is not in respiratory distress.  No decreased breath sounds.    Heart: Normal rate.  Regular rhythm.  S1 normal and S2 normal.  No murmur.   Abdomen: Abdomen is soft and non-distended.  There is no abdominal tenderness.     Extremities: Normal range of motion.  There is no dependent edema.    Neurological: Patient is alert and oriented to person, place and time.    Pupils:  Pupils are equal, round, and reactive to light.  Pupils are equal.   Skin:  Pale.              Results from last 7 days   Lab Units 02/20/20  0443 02/20/20  0314 02/19/20  1026   WBC 10*3/mm3 15.36* 15.43* 17.84*   HEMOGLOBIN g/dL 4.7* 5.4* 6.3*   PLATELETS 10*3/mm3 122* 140 236     Results from last 7 days   Lab Units 02/20/20  0314 02/19/20  1027   SODIUM mmol/L 138 140   POTASSIUM mmol/L 4.7 4.2   CO2 mmol/L 17.0* 15.0*   BUN mg/dL 54* 49*   CREATININE mg/dL 0.92 1.04*   MAGNESIUM mg/dL 1.6  --    GLUCOSE mg/dL 212* 200*     Estimated Creatinine Clearance: 42.5 mL/min (by C-G formula based on SCr of 0.92 mg/dL).            I reviewed the patient's results and images.     Assessment/Plan   Impression        Gastrointestinal bleeding    Acute blood loss anemia    Endometrial cancer (CMS/HCC)    Chemotherapy-induced neutropenia (CMS/HCC)    Chemotherapy-induced neuropathy (CMS/HCC)    Anxiety disorder due to medical condition    GERD (gastroesophageal reflux disease)    Dyslipidemia       Plan        Monitor in  ICU  Transfuse for Hgb <7  Serial H/H  EGD when adequately resuscitated  IVF  NPO  Follow up cultures  De-escalate antibiotics as able  D/C NSAIDS  Continue Zofran and Phenergan    HERIBERTO Iverson, [facility]-BC, FNP-BC  Pulmonary and Critical Care Service    I have been able to review the above documentation and I discussed the case with Ms. Sullivan.  I have also completed my own physical examination as well as review of the data.  In brief, this is a very nice 79-year-old woman with endometrial cancer who has developed acute blood loss anemia secondary to GI bleed.  The plan will be for stabilization and then endoscopy.  She will remain in the intensive care unit until further information is obtained.    Amos Ram MD, [facility]  Pulmonology and Critical Care Medicine

## 2020-02-20 NOTE — BRIEF OP NOTE
ESOPHAGOGASTRODUODENOSCOPY  Progress Note    Yin Siddiqui  2/20/2020    EGD shows clotted blood in the stomach.  There is an oozing distal duodenal bulb ulcer with visible vessel.  Endo Clip was attempted, but failed due to positioning and fibrotic ulcer base.  An Ovesco 11/3t clip was placed with prompt blanching of the entire ulcer bed and prompt hemostasis.    >> Await H. pylori biopsy  >> Twice daily PPI for 1 month, then daily.  >> Avoid NSAIDs    Mark I. Brunner, MD     Date: 2/20/2020  Time: 3:01 PM

## 2020-02-20 NOTE — PAYOR COMM NOTE
"Imelda Rodriguez RN Utilization Review [phone]  Fax # [phone]  Ref # [id number]            Yin Siddiqui (79 y.o. Female)     Date of Birth Social Security Number Address Home Phone MRN    1940  [address] [place] [address] [phone] [id number]    Mormon Marital Status          Hindu        Admission Date Admission Type Admitting Provider Attending Provider Department, Room/Bed    2/19/20 Elective Dasha Claudio MD Cottrill, Hope M., MD [facility] 2A ICU, N209/1    Discharge Date Discharge Disposition Discharge Destination                       Attending Provider:  Dasha Claudio MD    Allergies:  No Known Allergies    Isolation:  Spore   Infection:  C.difficile (rule out) (02/19/20)   Code Status:  CPR    Ht:  160 cm (62.99\")   Wt:  52.6 kg (116 lb)    Admission Cmt:  None   Principal Problem:  Gastrointestinal bleeding [K92.2]                 Active Insurance as of 2/19/2020     Primary Coverage     Payor Plan Insurance Group Employer/Plan Group    ANTHEM MEDICARE REPLACEMENT ANTHEM MEDICARE ADVANTAGE [id number]     Payor Plan Address Payor Plan Phone Number Payor Plan Fax Number Effective Dates    [address] [phone]  1/1/2019 - None Entered    [facility] [address]       Subscriber Name Subscriber Birth Date Member ID       YIN SIDDIQUI 1940 [id number]                 Emergency Contacts      (Rel.) Home Phone Work Phone Mobile Phone    Jose Siddiqui (Spouse) -- -- [phone]               History & Physical      Dasha Claudio MD at 02/19/20 0956          Yin Siddiqui   [id number]   1940   Reason for visit: Carcinosarcoma of the uterus, 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 female who presents today for treatment and evaluation of the above issues.   "   Patient presents today complaining of fatigue, shakiness, weakness, and poor p.o. intake. She had a cup of soup last night and vomited. She is dry heaving in clinic this morning with ice chips and is pretty much been this way since the past 36 hours. She complains of chills and shortness of breath. She denies cough and dysuria. She notes frequent bowel movements and has had to sit so far today. She had bright red blood in the toilet after her bowel movements yesterday and today. She has abdominal pelvic pain with standing.   Oncologic History:           Endometrial cancer (CMS/HCC)    9/18/2019 Initial Diagnosis     Endometrial biopsy by Dr. Lam due to recurrent postmenopausal bleeding. Pathology showed cytologic atypia in background of extensive tumor necrosis.  =36.6. Referred to Gyn Oncology     9/18/2019 Imaging     CT abd/pelvis IMPRESSION:   1. Approximately 7 cm central uterine mass, strongly vascular along its   posterior lateral right margin at approximately 7:00 as noted above. No   evidence of invasion into the surrounding soft tissues.   2. No evidence of adenopathy or other metastatic disease.   3. 2 cm nonenhancing water density right ovarian cyst.   4. Incidentally noted small hepatic cyst and normal variant bilateral   renal parapelvic cysts.   Pre-op CXR ARIS     9/27/2019 Surgery     Exploratory laparotomy, Type 1 radical hysterectomy, BSO, pelvic lymph node dissection, omentectomy, cystoscopy with bilateral temporary ureteral stents.   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 Sarcomatous component is undifferentiated. Cytology negative, no LVSI, nodes negative. Stage IA      Chemotherapy     OP UTERINE PACLitaxel / CARBOplatin (Q21D)   Dose reduction related to neutropenia.     11/19/2019 - 12/5/2019  Radiation     Radiation OncologyTreatment Course: Yin Siddiqui received 3000 cGy in 5 fractions to vagina via High Dose Radiation - HDR.     12/11/2019 -  Chemotherapy     OP FILGRASTIM (NEUPOGEN)           Past Medical History:   Diagnosis Date   • Anemia    • Degenerative disc disease, lumbar     hands and knees    • Dyslipidemia    • Endometrial cancer (CMS/HCC) 9/27/2019   • GERD (gastroesophageal reflux disease)    • Hyperlipidemia    • Menopause    • OAB (overactive bladder)    • Osteopenia    • PMB (postmenopausal bleeding)    • Skin cancer    • Urge incontinence    • Vaginal atrophy    • Wears dentures     upper   • Wears reading eyeglasses            Past Surgical History:   Procedure Laterality Date   • CATARACT EXTRACTION W/ INTRAOCULAR LENS IMPLANT, BILATERAL     • COLONOSCOPY  2019   • CYST REMOVAL      left wrist   • CYSTOSCOPY W/ URETERAL STENT PLACEMENT Bilateral 9/27/2019    Procedure: CYSTOSCOPY WITH BILATERAL TEMPORARY URETERAL STENT INSERTION; Surgeon: Dasha Claudio MD; Location:  [facility]; Service: Gynecology   • D&C HYSTEROSCOPY     • EXPLORATORY LAPAROTOMY, TOTAL ABDOMINAL HYSTERECTOMY, SALPINGO OOPHORECTOMY WITH TUMOR DEBULKING Bilateral 9/27/2019    Procedure: EXPLORATORY LAPAROTOMY TYPE 1, RADICAL TOTAL ABDOMINAL HYSTERECTOMY, BILATERAL SALPINGO OOPHORECTOMY, PELVIC LYMPH NODE DISSECTION, OMENTUMECTOMY; Surgeon: Dasha Claudio MD; Location:  [facility]; Service: Gynecology   • EYE SURGERY Left 09/05/2019    macular hole    • EYE SURGERY      as a child for muscular problems x 3   • HAND SURGERY Right     bone removed    • TONSILLECTOMY       MEDICATIONS: The current medication list was reviewed with the patient and updated in the EMR this date per the Medical Assistant. Medication dosages and frequencies were confirmed to be accurate.   Allergies: has No Known Allergies.   Social History:   Social History           Socioeconomic History   • Marital status:      Spouse  name: Not on file   • Number of children: Not on file   • Years of education: Not on file   • Highest education level: Not on file   Tobacco Use   • Smoking status: Never Smoker   • Smokeless tobacco: Never Used   Substance and Sexual Activity   • Alcohol use: No   • Drug use: No   • Sexual activity: Defer     Partners: Male     Birth control/protection: Post-menopausal     Family History:         Family History   Problem Relation Age of Onset   • Breast cancer Mother 78   • Ovarian cancer Neg Hx      Health Maintenance:        Health Maintenance   Topic Date Due   • TDAP/TD VACCINES (1 - Tdap) 10/03/1953   • Pneumococcal Vaccine Once at 65 Years Old  10/03/2007   • MEDICARE ANNUAL WELLNESS  04/10/2017   • DXA SCAN  05/23/2019   • ZOSTER VACCINE (2 of 2) 08/12/2019   • LIPID PANEL  09/26/2019   • MAMMOGRAM  05/15/2021   • COLONOSCOPY  04/16/2029   • INFLUENZA VACCINE  Completed     Review of Systems   Constitutional: Positive for chills and fatigue. Negative for fever and unexpected weight change.   HENT: Negative for ear pain and rhinorrhea.   Eyes: Positive for visual disturbance. Negative for pain and redness.   Respiratory: Positive for shortness of breath. Negative for cough.   Cardiovascular: Negative for chest pain and leg swelling.   Gastrointestinal: Positive for abdominal pain. Negative for constipation and diarrhea.   Endocrine: Negative for polydipsia and polyphagia.   Genitourinary: Positive for pelvic pain. Negative for flank pain and vaginal bleeding.   Musculoskeletal: Positive for arthralgias. Negative for myalgias.   Skin: Negative for pallor and rash.   Allergic/Immunologic: Negative for food allergies and immunocompromised state.   Neurological: Positive for dizziness and weakness. Negative for syncope and light-headedness.   Hematological: Negative for adenopathy. Does not bruise/bleed easily.   Psychiatric/Behavioral: Negative for dysphoric mood and suicidal ideas.     Physical Exam          Vitals:    02/19/20 0902   BP: (!) 87/40   Pulse: 107   Resp: 16   Temp: 96.9 °F (36.1 °C)   TempSrc: Temporal   SpO2: 98%   Weight: 52.6 kg (116 lb)     Body mass index is 20.55 kg/m².       Wt Readings from Last 3 Encounters:   02/19/20 52.6 kg (116 lb)   02/10/20 52.6 kg (116 lb)   01/29/20 52.8 kg (116 lb 8 oz)     GENERAL: Alert, thin appearing female appearing her stated age who is actively vomiting through part of today's evaluation.   HEENT: Sclera anicteric. Head normocephalic, atraumatic. Dry mucous membranes, alopecia.   NECK: Trachea midline, supple, without masses. No thyromegaly.   BREASTS: Deferred   CARDIOVASCULAR: Normal rate, regular rhythm, no murmurs, rubs, or gallops. No peripheral edema.   RESPIRATORY: Clear to auscultation bilaterally, normal respiratory effort   BACK: No CVA tenderness, no vertebral tenderness on palpation   GASTROINTESTINAL: Abdomen is soft,non-distended, no rebound or guarding, no masses, or hernias. No HSM. Mild tenderness at mid abdomen.   SKIN: Warm, dry, well-perfused. All visible areas intact. No rashes, lesions, ulcers.   PSYCHIATRIC: AO x3, with appropriate affect, normal thought processes.   NEUROLOGIC: No focal deficits. Moves extremities well.   MUSCULOSKELETAL: Normal gait and station.   EXTREMITIES: No cyanosis, clubbing, symmetric. PICC RUE.   LYMPHATICS: No inguinal or cervical adenopathy appreciated   PELVIC exam:   GYNECOLOGIC: Deferred   ECOG PS 0   PROCEDURES: none   Diagnostic Data:         Lab Results   Component Value Date    WBC 6.48 02/10/2020    HGB 10.5 (L) 02/10/2020    HCT 32.6 (L) 02/10/2020    .8 (H) 02/10/2020     (L) 02/10/2020    NEUTROABS 4.44 02/10/2020    GLUCOSE 97 01/29/2020    BUN 20 01/29/2020    CREATININE 0.80 01/29/2020    EGFRIFNONA 75 01/29/2020     01/29/2020    K 4.4 01/29/2020     01/29/2020    CO2 24.0 01/29/2020    MG 1.8 12/03/2019    CALCIUM 9.7 01/29/2020    ALBUMIN 4.40 01/29/2020    AST 23  01/29/2020    ALT 16 01/29/2020    BILITOT 0.4 01/29/2020            Lab Results    Component Value Date      8.1 01/29/2020      8.9 01/08/2020      9.6 12/18/2019      9.9 11/27/2019      36.6 09/15/2019        Assessment/Plan   This is a 79 y.o. woman who presents for toxicity evaluation while on chemotherapy for carcinosarcoma of the uterus.   Uterine carcinosarcoma   - Cycle #6 of 6 planned due today pending laboratory evaluation.   - PICC RUE   -She was scheduled for chemotherapy, but is quite ill today.   Chemotherapy induced neutropenia   - Neulasta OBI, EMLA cream to decrease discomfort with application   Chemotherapy-induced neuropathy   -Improved with IV fluids which is not typical. However, patient is active and symptoms are manageable. No neuropathy today.   Dehydration, hypotension, nausea, vomiting, diarrhea, chills, shortness of breath   -Hospital admission for IV fluids. We will further evaluate shortness of breath after she has received IV fluids. She may need CT scan as well to exclude progression on treatment. Will proceed with symptom management including antiemetics. Will evaluate stool for ova and parasite, C. difficile. I would anticipate that she would be in the hospital a minimum of 1 days.   Pain assessment was performed today as a part of patient’s care. For patients with pain related to surgery, gynecologic malignancy or cancer treatment, the plan is as noted in the assessment/plan. For patients with pain not related to these issues, they are to seek any further needed care from a more appropriate provider, such as PCP.   No orders of the defined types were placed in this encounter.     I saw and evaluated the patient. I agree with the findings and the plan of care as documented in the note.    Dasha Claudio MD  02/19/20  9:57 AM    Due to decreased blood pressure (87/40) at the time of presentation, elevated heart rate (107), and elevated white cell count  (17,000), patient meets the criteria for sepsis, unknown etiology.  In addition she is significantly anemic and this could have been contributing to her elevated heart rate.  There is concern for GI bleed as the degree of anemia is not consistent for what would be anticipated related to patient's chemotherapy treatment.  Further, she noted bright red blood per rectum and multiple episodes of diarrhea.  Gastrointestinal consult was ordered.  Sepsis bundle was completed.      Electronically signed by Dasha Claudio MD at 02/19/20 1702       Vital Signs (last day)     Date/Time   Temp   Temp src   Pulse   Resp   BP   Patient Position   SpO2    02/20/20 0848   98.8 (37.1)   Oral   89   16   --   --   97    02/20/20 0845   --   --   90   --   120/59   --   97    02/20/20 0838   98.5 (36.9)   Oral   87   16   121/54   --   98    02/20/20 0830   --   --   84   --   121/54   --   98    02/20/20 0827   98.3 (36.8)   Oral   85   16   137/63   --   98    02/20/20 0700   98.4 (36.9)   Oral   95   16   109/56   Lying   97    02/20/20 0600   97.6 (36.4)   Oral   106   16   92/42   --   98    02/20/20 0543   98.1 (36.7)   Oral   92   16   103/55   --   97    02/20/20 0434   --   --   89   --   105/51   --   --    02/20/20 0334   97.4 (36.3)   Oral   99   18   114/49   --   100    02/19/20 2308   98.9 (37.2)   Oral   80   16   102/55   --   96    02/19/20 2046   98.6 (37)   Oral   94   --   107/51   Lying   --    02/19/20 1929   98.7 (37.1)   Oral   93   16   99/54   --   99    02/19/20 1730   99.9 (37.7)   Oral   92   --   117/59   Lying   --    02/19/20 1533   97.9 (36.6)   Oral   87   --   108/52   Lying   100    02/19/20 1432   98.9 (37.2)   Oral   80   16   109/56   Lying   100    02/19/20 1347   98 (36.7)   Oral   84   18   118/58   Lying   100    02/19/20 0950   97.5 (36.4)   Oral   106   16   114/55   --   99    02/19/20 0949   --   --   --   --   --   --   90                Current Facility-Administered Medications    Medication Dose Route Frequency Provider Last Rate Last Dose   • acetaminophen (TYLENOL) tablet 650 mg  650 mg Oral Q6H PRN Sophie Pagan MD       • atorvastatin (LIPITOR) tablet 10 mg  10 mg Oral Daily Sophie Pagan MD   10 mg at 02/19/20 2111   • donepezil (ARICEPT) tablet 10 mg  10 mg Oral Nightly Sophie Pagan MD   10 mg at 02/19/20 2111   • hydrOXYzine (ATARAX) tablet 25 mg  25 mg Oral Nightly PRN Sophie Pagan MD       • lactated ringers infusion  125 mL/hr Intravenous Continuous Sophie Pagan  mL/hr at 02/19/20 1224 125 mL/hr at 02/19/20 1224   • melatonin tablet 5 mg  5 mg Oral Nightly PRN Sophie Pagan MD       • ondansetron (ZOFRAN) 8 mg in sodium chloride 0.9 % 50 mL IVPB  8 mg Intravenous Q6H PRN Sophie Pagan MD   8 mg at 02/20/20 0510   • ondansetron ODT (ZOFRAN-ODT) disintegrating tablet 8 mg  8 mg Oral Q6H PRN Sophie Pagan MD       • pantoprazole (PROTONIX) injection 40 mg  40 mg Intravenous BID Brunner, Mark I, MD   40 mg at 02/20/20 0841   • Pharmacy to dose vancomycin   Does not apply Continuous PRN Sophie Pagan MD       • piperacillin-tazobactam (ZOSYN) 3.375 g in iso-osmotic dextrose 50 ml (premix)  3.375 g Intravenous Q8H Sophie Pagan MD   3.375 g at 02/20/20 0351   • promethazine (PHENERGAN) tablet 12.5 mg  12.5 mg Oral Q6H PRN Sophie Pagan MD   12.5 mg at 02/20/20 0742    Or   • promethazine (PHENERGAN) injection 12.5 mg  12.5 mg Intravenous Q6H PRN Sophie Pagan MD   12.5 mg at 02/19/20 1030   • vancomycin in dextrose 5% 150 mL (VANCOCIN) IVPB 750 mg  750 mg Intravenous Q24H Katlin Capps, [facility]         Blood Administration Record (From admission, onward)    Transfusions already released     Ordered     Start    02/20/20 0408  Transfuse RBC, 2 Units Infuse Each Unit Over: 2H  Transfusion     Released Time Blood Unit Number Status   02/20/20 0519   20  [id number]  [id number] Completed 02/20/20 0841   02/20/20 0829   20  [id number]  [id number] Transfusing       02/20/20  0407          Completed transfusions     Ordered     Start    02/19/20 1112  Transfuse RBC, 2 Units Infuse Each Unit Over: 3.5H  Transfusion     Released Time Blood Unit Number Status   02/19/20 1148   19  [id number]  [id number] Completed 02/19/20 1815 02/19/20 1730   20  [id number]  [id number] Completed 02/19/20 2111 02/19/20 1112                Orders (active)      Start     Ordered    02/21/20 2028  Auto Discontinue GI Panel in 48 Hours if not Collected  ONCE GI PANEL      02/19/20 2028 02/21/20 0956  Auto Discontinue in 48 Hours if not Collected  ONCE CDIFF      02/19/20 0959    02/21/20 0953  Auto Discontinue GI Panel in 48 Hours if not Collected  ONCE GI PANEL      02/19/20 0959    02/20/20 1800  vancomycin in dextrose 5% 150 mL (VANCOCIN) IVPB 750 mg  Every 24 Hours      02/1940    02/20/20 0850  NPO Diet NPO Except: Ice Chips  Diet Effective Now      02/20/20 0849    02/20/20 0815  pantoprazole (PROTONIX) injection 40 mg  2 Times Daily     Note to Pharmacy:  40mg NOW and then 40mg BID    02/20/20 0807    02/20/20 0408  Verify Informed Consent for Blood Product Administration  Once      02/20/20 0408    02/20/20 0407  Transfuse RBC, 2 Units Infuse Each Unit Over: 2H  Transfusion      02/20/20 0408    02/20/20 0407  Gastrointestinal Panel, PCR - Stool, Per Rectum  Once     Comments:  Add on to stool specimen sent/      02/20/20 0408    02/20/20 0406  Patient Isolation Contact Spore  Continuous     Comments:  Isolation Precautions Per Clostridium Difficile (CDI) Infection Control Policy / Process    02/20/20 0408    02/20/20 0400  piperacillin-tazobactam (ZOSYN) 3.375 g in iso-osmotic dextrose 50 ml (premix)  Every 8 Hours      02/19/20 1726    02/19/20 2200  Urine Culture - Urine,  Once      02/19/20 2159    02/19/20 2100  donepezil (ARICEPT) tablet 10 mg  Nightly      02/19/20 1721    02/19/20 1815  atorvastatin (LIPITOR) tablet 10 mg  Daily      02/19/20 1721    02/19/20 1729  Blood  Culture - Blood, Blood, PICC Line  Once      02/19/20 1728    02/19/20 1729  Blood Culture - Blood,  Once     Comments:  Peripheral site      02/19/20 1729    02/19/20 1723  Pharmacy to dose vancomycin  Continuous PRN      02/19/20 1726    02/19/20 1719  hydrOXYzine (ATARAX) tablet 25 mg  Nightly PRN      02/19/20 1719    02/19/20 1719  melatonin tablet 5 mg  Nightly PRN      02/19/20 1719    02/19/20 1702  XR Chest 1 View  1 Time Imaging      02/19/20 1702    02/19/20 1658  acetaminophen (TYLENOL) tablet 650 mg  Every 6 Hours PRN      02/19/20 1658    02/19/20 1113  Verify Informed Consent for Blood Product Administration  Once      02/19/20 1112    02/19/20 1112  OK to use PICC line.  Nursing Communication  Continuous     Comments:  OK to use PICC line.    02/19/20 1111    02/19/20 1045  lactated ringers infusion  Continuous      02/19/20 0959    02/19/20 1000  Place Sequential Compression Device  Once      02/19/20 0959    02/19/20 1000  Maintain Sequential Compression Device  Continuous      02/19/20 0959    02/19/20 1000  Code Status and Medical Interventions:  Continuous      02/19/20 0959    02/19/20 0957  promethazine (PHENERGAN) tablet 12.5 mg  Every 6 Hours PRN      02/19/20 0959    02/19/20 0957  promethazine (PHENERGAN) injection 12.5 mg  Every 6 Hours PRN      02/19/20 0959    02/19/20 0955  ondansetron ODT (ZOFRAN-ODT) disintegrating tablet 8 mg  Every 6 Hours PRN      02/19/20 0959    02/19/20 0955  ondansetron (ZOFRAN) 8 mg in sodium chloride 0.9 % 50 mL IVPB  Every 6 Hours PRN      02/19/20 0959                Operative/Procedure Notes (all)    No notes of this type exist for this encounter.            Physician Progress Notes (all)      Dasha Claudio MD at 02/20/20 0742          Gynecologic Oncology   Daily Progress Note    Chief Complaint: anemia, diarrhea, weakness    Subjective   Patient initially did much better yesterday after fluid resuscitation and 2 units of blood.  She had a formed  stool, was hungry, and denied nausea/vomiting when patient was evaluated at 5pm last night.  Then, overnight patient had large volume of dark brown-blackish stool with clots around 4am.  She became hypotensive at that time.  Her labs resulted with hct of 16 and she was given 250cc bolus and additional 2 units of pRBCs were ordered.  She then later had another large volume bloody stool. This morning patient reports feeling ill and overall unwell.  She reports nausea but denies vomiting.  She denies chest pain or shortness of breath.  We discussed transfer to ICU for closer monitoring.  answered patient's questions.    Objective   Temp:  [96.9 °F (36.1 °C)-99.9 °F (37.7 °C)] 98.4 °F (36.9 °C)  Heart Rate:  [] 95  Resp:  [16-18] 16  BP: ()/(40-59) 109/56  Vitals:    02/20/20 0700   BP: 109/56   Pulse: 95   Resp: 16   Temp: 98.4 °F (36.9 °C)   SpO2: 97%     I/O last 3 completed shifts:  In: 680 [Blood:680]  Out: 1275 [Urine:675; Stool:600]     GENERAL: Alert, ill-appearing female in no apparent distress.  Pale.  CARDIOVASCULAR: Normal rate, regular rhythm, no murmurs, rubs, or gallops.    RESPIRATORY: Clear to auscultation bilaterally, normal respiratory effort  GASTROINTESTINAL:  Soft, midly tender.  Midline incision scar.  GENITOURINARY: No giles.    SKIN:  Warm, dry, well-perfused.    PSYCHIATRIC: AO x3, with appropriate affect, normal thought processes  EXREMITIES: Symmetric. No peripheral edema.    Lab Results   Component Value Date    WBC 15.36 (H) 02/20/2020    HGB 4.7 (C) 02/20/2020    HCT 14.3 (C) 02/20/2020    MCV 97.3 (H) 02/20/2020     (L) 02/20/2020    NEUTROABS 15.80 (H) 02/19/2020    GLUCOSE 212 (H) 02/20/2020    BUN 54 (H) 02/20/2020    CREATININE 0.92 02/20/2020    EGFRIFNONA 59 (L) 02/20/2020     02/20/2020    K 4.7 02/20/2020     02/20/2020    CO2 17.0 (L) 02/20/2020    MG 1.6 02/20/2020    CALCIUM 7.2 (L) 02/20/2020     Assessment/Plan    This is a 79 y.o.  admitted  2/19/2020 for dehydration, bloody stools, weakness.     Sepsis of unknown origin  -Hypotensive with leukocytosis of 18,000 on arrival  -Sepsis bundle ordered  -S/p 1 L bolus on arrival to hospital  -Empiric antibiotics started-Vanc and Zosyn  -Urine cx and blood cx pending  -CXR performed but no final read yet  -C. Diff and ova/parasite panel pending as pt had diarrhea  -Lactate 2.3-7.9  -Will likely need additional line on top of PICC -- will defer to ICU team.    Concern for GI bleed  -Hct 20 on arrival, S/p 2u pRBCs 2/19/2020.  This morning H/H 5.4/17.  Repeat H/H 4.7/14.3.  -Additional 2 units ordered 2/20/2020  -Patient with two large volume episodes of dark bloody stools overnight  -Continue protonix BID  -Gastroenterology consulted and will see first thing this morning    Uterine carcinosarcoma   -PICC RUE  -She was scheduled for cycle #6 chemotherapy 2/19/2020 but presented to the clinic and was ill with dehydration, hypotension, nausea, vomiting, diarrhea, chills, shortness of breath.  She was admitted directly from clinic.    Dispo: Patient being moved to ICU for higher level of care.          Sophie Pagan MD  02/20/20  7:42 AM    I saw and evaluated the patient. I agree with the findings and the plan of care as documented in the note.    I discussed this case with Dr. Oliva and Dr.Brunner.  Patient appropriate for ICU transfer.  I discussed this with the patient and her .  I discussed the possibility of central line placement.  Consistent with patient's baseline, she had questions about her care that were a little bit out of sync with her physicians concerns.  Advised patient and  that ICU care was necessary due to her ongoing GI bleed, need for higher level care, and ongoing issues with hemodynamic instability.  Dr. Brunner was at the bedside when I saw the patient.    Dasha Claudio MD  02/20/20  8:41 AM              Electronically signed by Dasha Caludio MD at 02/20/20 2761           Consult Notes (all)      Brunner, Mark I, MD at 02/20/20 0821      Consult Orders    1. Inpatient Gastroenterology Consult [[id number]] ordered by Sophie Pagan MD at 02/19/20 1620                [facility]    Referring Provider: Dasha Claudio MD    PCP: Manoj Brown MD    Reason for Consultation: GI bleed    Chief complaint: Weakness    History of present illness:    Yin Siddiqui is a 79 y.o. female who is admitted with 4-day history of weakness.  There is associated dark stools, dyspnea with exertion, and crampy abdominal discomfort..  There is associated nausea and hematemesis of coffee grounds.  Patient is found to have severe anemia with a hemoglobin of 5.4.  She was transfused 2 units packed red blood cells last night, however, hemoglobin this morning remains critically low at 4.7.  Additional blood has been ordered.  Recent hemoglobin 10 days ago was 10.5.  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  Patient takes naproxen as needed for arthritis.  She reports receiving corticosteroids with hemotherapy.  Has no prior history of peptic ulcer disease.    Allergies:  Patient has no known allergies.    Scheduled Meds:    atorvastatin 10 mg Oral Daily   donepezil 10 mg Oral Nightly   pantoprazole 40 mg Intravenous BID   piperacillin-tazobactam 3.375 g Intravenous Q8H   vancomycin 750 mg Intravenous Q24H        Infusions:    lactated ringers 125 mL/hr Last Rate: 125 mL/hr (02/19/20 1224)   Pharmacy to dose vancomycin         PRN Meds:  •  acetaminophen  •  hydrOXYzine  •  ibuprofen  •  melatonin  •  ondansetron  •  ondansetron ODT  •  [COMPLETED] vancomycin **AND** Pharmacy to dose vancomycin  •  promethazine **OR** promethazine    Home Meds:  Medications Prior to Admission   Medication Sig Dispense Refill Last Dose   • CALCIUM-VITAMIN D PO Take 1 tablet by mouth Daily.   Taking   •  dexamethasone (DECADRON) 4 MG tablet Take 5 tabs the night before chemo then take 2 tabs in the morning on days 2, 3 & 4. Take with food. 11 tablet 5 Taking   • docusate sodium (COLACE) 100 MG capsule Take 2 capsules by mouth 2 (Two) Times a Day As Needed for Constipation. 60 capsule 2 Taking   • donepezil (ARICEPT) 10 MG tablet Take 10 mg by mouth Every Night.   Taking   • loratadine (CLARITIN) 10 MG tablet Take 1 tab the night before chemo then take 1 tab the morning of chemo. 12 tablet 1 Taking   • Multiple Vitamins-Minerals (MULTIVITAMIN ADULT PO) Take 1 tablet by mouth Daily.   Taking   • Omega-3 Fatty Acids (FISH OIL) 1000 MG capsule capsule Take 1,000 mg by mouth Daily With Breakfast.   Taking   • ondansetron (ZOFRAN) 4 MG tablet Take 1 tablet by mouth Every 6 (Six) Hours As Needed for Nausea or Vomiting. 10 tablet 0 Taking   • ondansetron (ZOFRAN) 8 MG tablet Take 1 tablet by mouth 3 (Three) Times a Day As Needed for Nausea or Vomiting. 30 tablet 5 Taking   • polyethylene glycol (MIRALAX) powder Take 17 g by mouth Daily.   Taking   • promethazine (PHENERGAN) 25 MG tablet Take 1 tablet by mouth Every 6 (Six) Hours As Needed for Nausea or Vomiting. 30 tablet 5 Taking   • simvastatin (ZOCOR) 10 MG tablet Take 10 mg by mouth every night.   Taking   • traMADol (ULTRAM) 50 MG tablet Take 1 tablet by mouth Every 6 (Six) Hours As Needed for Moderate Pain . 20 tablet 0 Taking   • Xylitol (XYLIMELTS) 550 MG disk Apply  to the mouth or throat Every Night.   Taking       ROS: Review of Systems   Constitutional: Positive for activity change. Negative for appetite change, chills, fever and unexpected weight change.   HENT: Negative.  Negative for mouth sores, nosebleeds and trouble swallowing.    Eyes: Negative.    Respiratory: Positive for shortness of breath. Negative for cough, choking, chest tightness, wheezing and stridor.    Cardiovascular: Negative.  Negative for chest pain, palpitations and leg swelling.    Gastrointestinal: Positive for abdominal pain, blood in stool, diarrhea, nausea and vomiting. Negative for abdominal distention.   Endocrine: Negative.    Genitourinary: Negative.  Negative for difficulty urinating and dysuria.   Musculoskeletal: Positive for arthralgias. Negative for back pain.   Skin: Positive for pallor. Negative for color change and rash.   Allergic/Immunologic: Negative.    Neurological: Positive for weakness and light-headedness. Negative for tremors, seizures and syncope.   Hematological: Negative.  Negative for adenopathy. Does not bruise/bleed easily.   Psychiatric/Behavioral: Negative.  Negative for agitation and behavioral problems.       PAST MED HX:  Past Medical History:   Diagnosis Date   • Anemia    • Degenerative disc disease, lumbar     hands and knees    • Dyslipidemia    • Endometrial cancer (CMS/HCC) 9/27/2019   • GERD (gastroesophageal reflux disease)    • Hyperlipidemia    • Menopause    • OAB (overactive bladder)    • Osteopenia    • PMB (postmenopausal bleeding)    • Skin cancer    • Urge incontinence    • Vaginal atrophy    • Wears dentures     upper   • Wears reading eyeglasses        PAST SURG HX:  Past Surgical History:   Procedure Laterality Date   • CATARACT EXTRACTION W/ INTRAOCULAR LENS  IMPLANT, BILATERAL     • COLONOSCOPY  2019   • CYST REMOVAL      left wrist   • CYSTOSCOPY W/ URETERAL STENT PLACEMENT Bilateral 9/27/2019    Procedure: CYSTOSCOPY WITH BILATERAL TEMPORARY URETERAL STENT INSERTION;  Surgeon: Dasha Claudio MD;  Location:  [facility];  Service: Gynecology   • D&C HYSTEROSCOPY     • EXPLORATORY LAPAROTOMY, TOTAL ABDOMINAL HYSTERECTOMY, SALPINGO OOPHORECTOMY WITH TUMOR DEBULKING Bilateral 9/27/2019    Procedure: EXPLORATORY LAPAROTOMY TYPE 1, RADICAL TOTAL ABDOMINAL HYSTERECTOMY, BILATERAL SALPINGO OOPHORECTOMY, PELVIC LYMPH NODE DISSECTION, OMENTUMECTOMY;  Surgeon: Dasha Claudio MD;  Location:  [facility];  Service: Gynecology   • EYE SURGERY  Left 09/05/2019    macular hole    • EYE SURGERY      as a child for muscular problems  x 3   • HAND SURGERY Right     bone removed    • TONSILLECTOMY         FAM HX:  Family History   Problem Relation Age of Onset   • Breast cancer Mother 78   • Ovarian cancer Neg Hx        SOC HX:  Social History     Socioeconomic History   • Marital status:      Spouse name: Not on file   • Number of children: Not on file   • Years of education: Not on file   • Highest education level: Not on file   Tobacco Use   • Smoking status: Never Smoker   • Smokeless tobacco: Never Used   Substance and Sexual Activity   • Alcohol use: No   • Drug use: No   • Sexual activity: Defer     Partners: Male     Birth control/protection: Post-menopausal       PHYSICAL EXAM  /56 (BP Location: Left arm, Patient Position: Lying)   Pulse 95   Temp 98.4 °F (36.9 °C) (Oral)   Resp 16   SpO2 97%   Wt Readings from Last 3 Encounters:   02/19/20 52.6 kg (116 lb)   02/10/20 52.6 kg (116 lb)   01/29/20 52.8 kg (116 lb 8 oz)   ,body mass index is unknown because there is no height or weight on file.  Physical Exam   Constitutional: She is oriented to person, place, and time. She appears well-developed and well-nourished. No distress.   HENT:   Head: Normocephalic.   Mouth/Throat: No oropharyngeal exudate.   Eyes: Conjunctivae are normal. No scleral icterus.   Neck: Normal range of motion.   Cardiovascular: Normal rate and regular rhythm.   Pulmonary/Chest: Effort normal and breath sounds normal. No stridor. No respiratory distress. She has no wheezes. She has no rales.   Abdominal: Soft. She exhibits no distension and no mass. There is no tenderness. There is no guarding.   Bowel sounds are hyperactive.   Genitourinary:   Genitourinary Comments: Deferred   Musculoskeletal: Normal range of motion. She exhibits no edema.   Neurological: She is alert and oriented to person, place, and time.   Skin: Skin is warm and dry. Capillary refill takes  less than 2 seconds. No rash noted. No erythema. There is pallor.   Psychiatric: She has a normal mood and affect. Her behavior is normal.   Nursing note and vitals reviewed.      Results Review:   I reviewed the patient's new clinical results.    Lab Results   Component Value Date    WBC 15.36 (H) 02/20/2020    HGB 4.7 (C) 02/20/2020    HGB 5.4 (C) 02/20/2020    HGB 6.3 (C) 02/19/2020    HCT 14.3 (C) 02/20/2020    MCV 97.3 (H) 02/20/2020     (L) 02/20/2020       Lab Results   Component Value Date    INR 1.00 09/15/2019       Lab Results   Component Value Date    GLUCOSE 212 (H) 02/20/2020    BUN 54 (H) 02/20/2020    CREATININE 0.92 02/20/2020    EGFRIFNONA 59 (L) 02/20/2020    BCR 58.7 (H) 02/20/2020     02/20/2020    K 4.7 02/20/2020    CO2 17.0 (L) 02/20/2020    CALCIUM 7.2 (L) 02/20/2020    ALBUMIN 3.60 02/19/2020    ALKPHOS 68 02/19/2020    BILITOT 0.3 02/19/2020    ALT 15 02/19/2020    AST 23 02/19/2020       ASSESSMENTS/PLANS    Symptomatic acute blood loss anemia with weakness and dyspnea.  Suspect peptic ulcer.  Hemorrhagic shock, compensated.  Consumptive thrombocytopenia  Hematemesis of coffee grounds with nausea.  Melena.  Chronic intermittent NSAID use.     >> Increase Protonix to 40 mg IV twice daily.  >> Agree with transfer to ICU  >> Plan EGD today when adequately resuscitated.    I discussed the patients findings and my recommendations with patient, family and consulting provider    Mark I. Brunner, MD  02/20/20  8:22 AM      Electronically signed by Brunner, Mark I, MD at 02/20/20 7022

## 2020-02-20 NOTE — ANESTHESIA POSTPROCEDURE EVALUATION
Patient: Yin Siddiqui    Procedure Summary     Date:  02/20/20 Room / Location:   [facility]    Anesthesia Start:  1349 Anesthesia Stop:  1425    Procedure:  ESOPHAGOGASTRODUODENOSCOPY (N/A ) Diagnosis:      Surgeon:  Brunner, Mark I, MD Provider:  Kevin Lozano MD    Anesthesia Type:  general ASA Status:  3          Anesthesia Type: general    Vitals  No vitals data found for the desired time range.          Post Anesthesia Care and Evaluation    Patient location during evaluation: PACU  Patient participation: complete - patient participated  Level of consciousness: awake and alert  Pain score: 0  Pain management: adequate  Airway patency: patent  Anesthetic complications: No anesthetic complications  PONV Status: none  Cardiovascular status: hemodynamically stable and acceptable  Respiratory status: nonlabored ventilation, acceptable and nasal cannula  Hydration status: acceptable

## 2020-02-20 NOTE — ANESTHESIA PREPROCEDURE EVALUATION
Anesthesia Evaluation                  Airway   Mallampati: I  TM distance: >3 FB  Neck ROM: full  No difficulty expected  Dental      Pulmonary    Cardiovascular         Neuro/Psych  (+) psychiatric history Anxiety and Depression,     GI/Hepatic/Renal/Endo    (+)  GERD, GI bleeding ,     Musculoskeletal     Abdominal    Substance History      OB/GYN          Other                        Anesthesia Plan    ASA 3     general     intravenous induction     Anesthetic plan, all risks, benefits, and alternatives have been provided, discussed and informed consent has been obtained with: patient.    Plan discussed with CRNA.

## 2020-02-21 LAB
ABO + RH BLD: NORMAL
ABO + RH BLD: NORMAL
ANION GAP SERPL CALCULATED.3IONS-SCNC: 6 MMOL/L (ref 5–15)
BACTERIA SPEC AEROBE CULT: NORMAL
BASOPHILS # BLD AUTO: 0.02 10*3/MM3 (ref 0–0.2)
BASOPHILS NFR BLD AUTO: 0.2 % (ref 0–1.5)
BH BB BLOOD EXPIRATION DATE: NORMAL
BH BB BLOOD EXPIRATION DATE: NORMAL
BH BB BLOOD TYPE BARCODE: 6200
BH BB BLOOD TYPE BARCODE: 6200
BH BB DISPENSE STATUS: NORMAL
BH BB DISPENSE STATUS: NORMAL
BH BB PRODUCT CODE: NORMAL
BH BB PRODUCT CODE: NORMAL
BH BB UNIT NUMBER: NORMAL
BH BB UNIT NUMBER: NORMAL
BUN BLD-MCNC: 24 MG/DL (ref 8–23)
BUN/CREAT SERPL: 32.4 (ref 7–25)
CALCIUM SPEC-SCNC: 7.2 MG/DL (ref 8.6–10.5)
CHLORIDE SERPL-SCNC: 109 MMOL/L (ref 98–107)
CO2 SERPL-SCNC: 26 MMOL/L (ref 22–29)
CREAT BLD-MCNC: 0.74 MG/DL (ref 0.57–1)
CYTO UR: NORMAL
DEPRECATED RDW RBC AUTO: 48.7 FL (ref 37–54)
EOSINOPHIL # BLD AUTO: 0.19 10*3/MM3 (ref 0–0.4)
EOSINOPHIL NFR BLD AUTO: 1.7 % (ref 0.3–6.2)
ERYTHROCYTE [DISTWIDTH] IN BLOOD BY AUTOMATED COUNT: 16.8 % (ref 12.3–15.4)
GFR SERPL CREATININE-BSD FRML MDRD: 76 ML/MIN/1.73
GLUCOSE BLD-MCNC: 100 MG/DL (ref 65–99)
HCT VFR BLD AUTO: 23.8 % (ref 34–46.6)
HCT VFR BLD AUTO: 24.1 % (ref 34–46.6)
HCT VFR BLD AUTO: 24.6 % (ref 34–46.6)
HGB BLD-MCNC: 8.1 G/DL (ref 12–15.9)
IMM GRANULOCYTES # BLD AUTO: 0.26 10*3/MM3 (ref 0–0.05)
IMM GRANULOCYTES NFR BLD AUTO: 2.3 % (ref 0–0.5)
LAB AP CASE REPORT: NORMAL
LAB AP CLINICAL INFORMATION: NORMAL
LYMPHOCYTES # BLD AUTO: 1.85 10*3/MM3 (ref 0.7–3.1)
LYMPHOCYTES NFR BLD AUTO: 16.5 % (ref 19.6–45.3)
MAGNESIUM SERPL-MCNC: 1.7 MG/DL (ref 1.6–2.4)
MCH RBC QN AUTO: 30.7 PG (ref 26.6–33)
MCHC RBC AUTO-ENTMCNC: 33.6 G/DL (ref 31.5–35.7)
MCV RBC AUTO: 91.3 FL (ref 79–97)
MONOCYTES # BLD AUTO: 1.08 10*3/MM3 (ref 0.1–0.9)
MONOCYTES NFR BLD AUTO: 9.6 % (ref 5–12)
NEUTROPHILS # BLD AUTO: 7.82 10*3/MM3 (ref 1.7–7)
NEUTROPHILS NFR BLD AUTO: 69.7 % (ref 42.7–76)
NRBC BLD AUTO-RTO: 0.8 /100 WBC (ref 0–0.2)
PATH REPORT.FINAL DX SPEC: NORMAL
PATH REPORT.GROSS SPEC: NORMAL
PLATELET # BLD AUTO: 109 10*3/MM3 (ref 140–450)
PMV BLD AUTO: 9.9 FL (ref 6–12)
POTASSIUM BLD-SCNC: 3.3 MMOL/L (ref 3.5–5.2)
POTASSIUM BLD-SCNC: 3.8 MMOL/L (ref 3.5–5.2)
RBC # BLD AUTO: 2.64 10*6/MM3 (ref 3.77–5.28)
SODIUM BLD-SCNC: 141 MMOL/L (ref 136–145)
UNIT  ABO: NORMAL
UNIT  ABO: NORMAL
UNIT  RH: NORMAL
UNIT  RH: NORMAL
WBC NRBC COR # BLD: 11.22 10*3/MM3 (ref 3.4–10.8)

## 2020-02-21 PROCEDURE — 25010000002 PIPERACILLIN SOD-TAZOBACTAM PER 1 G: Performed by: INTERNAL MEDICINE

## 2020-02-21 PROCEDURE — 85014 HEMATOCRIT: CPT | Performed by: INTERNAL MEDICINE

## 2020-02-21 PROCEDURE — 99232 SBSQ HOSP IP/OBS MODERATE 35: CPT | Performed by: INTERNAL MEDICINE

## 2020-02-21 PROCEDURE — 85025 COMPLETE CBC W/AUTO DIFF WBC: CPT | Performed by: INTERNAL MEDICINE

## 2020-02-21 PROCEDURE — 85018 HEMOGLOBIN: CPT | Performed by: INTERNAL MEDICINE

## 2020-02-21 PROCEDURE — 84132 ASSAY OF SERUM POTASSIUM: CPT | Performed by: NURSE PRACTITIONER

## 2020-02-21 PROCEDURE — 80048 BASIC METABOLIC PNL TOTAL CA: CPT | Performed by: INTERNAL MEDICINE

## 2020-02-21 PROCEDURE — 83735 ASSAY OF MAGNESIUM: CPT | Performed by: INTERNAL MEDICINE

## 2020-02-21 PROCEDURE — 99232 SBSQ HOSP IP/OBS MODERATE 35: CPT | Performed by: OBSTETRICS & GYNECOLOGY

## 2020-02-21 RX ORDER — POTASSIUM CHLORIDE 750 MG/1
40 CAPSULE, EXTENDED RELEASE ORAL AS NEEDED
Status: DISCONTINUED | OUTPATIENT
Start: 2020-02-21 | End: 2020-02-23 | Stop reason: HOSPADM

## 2020-02-21 RX ORDER — POTASSIUM CHLORIDE 1.5 G/1.77G
40 POWDER, FOR SOLUTION ORAL AS NEEDED
Status: DISCONTINUED | OUTPATIENT
Start: 2020-02-21 | End: 2020-02-23 | Stop reason: HOSPADM

## 2020-02-21 RX ORDER — POTASSIUM CHLORIDE 7.45 MG/ML
10 INJECTION INTRAVENOUS
Status: DISCONTINUED | OUTPATIENT
Start: 2020-02-21 | End: 2020-02-23 | Stop reason: HOSPADM

## 2020-02-21 RX ADMIN — PANTOPRAZOLE SODIUM 40 MG: 40 INJECTION, POWDER, FOR SOLUTION INTRAVENOUS at 20:21

## 2020-02-21 RX ADMIN — PANTOPRAZOLE SODIUM 40 MG: 40 INJECTION, POWDER, FOR SOLUTION INTRAVENOUS at 08:33

## 2020-02-21 RX ADMIN — TAZOBACTAM SODIUM AND PIPERACILLIN SODIUM 3.38 G: 375; 3 INJECTION, SOLUTION INTRAVENOUS at 23:00

## 2020-02-21 RX ADMIN — TAZOBACTAM SODIUM AND PIPERACILLIN SODIUM 3.38 G: 375; 3 INJECTION, SOLUTION INTRAVENOUS at 11:00

## 2020-02-21 RX ADMIN — DONEPEZIL HYDROCHLORIDE 10 MG: 10 TABLET, FILM COATED ORAL at 20:21

## 2020-02-21 RX ADMIN — SODIUM CHLORIDE, POTASSIUM CHLORIDE, SODIUM LACTATE AND CALCIUM CHLORIDE 125 ML/HR: 600; 310; 30; 20 INJECTION, SOLUTION INTRAVENOUS at 04:02

## 2020-02-21 RX ADMIN — POTASSIUM CHLORIDE 40 MEQ: 1.5 POWDER, FOR SOLUTION ORAL at 14:21

## 2020-02-21 RX ADMIN — POTASSIUM CHLORIDE 40 MEQ: 1.5 POWDER, FOR SOLUTION ORAL at 10:46

## 2020-02-21 RX ADMIN — TAZOBACTAM SODIUM AND PIPERACILLIN SODIUM 3.38 G: 375; 3 INJECTION, SOLUTION INTRAVENOUS at 04:01

## 2020-02-21 RX ADMIN — ATORVASTATIN CALCIUM 10 MG: 10 TABLET, FILM COATED ORAL at 08:33

## 2020-02-21 NOTE — PROGRESS NOTES
Clinical Nutrition   Reason For Visit: MDR, Admission assessment    Patient Name: Yin Siddiqui  YOB: 1940  MRN: [id number]  Date of Encounter: 02/21/20 1:19 PM  Admission date: 2/19/2020      Nutrition Assessment     Admission Problem List:  Carcinosarcoma of the uterus  Recent chemo  Nausea  Melena  Hematemesis  Hemorrhagic shock  Severe anemia  S/p EGD (2/20)- clotted blood in stomach. Oozing istal duodenal bulb ulcer with visible vessel. Clip was placed with prompt blanching of the entire ulcer bed and prompt hemostasis.      PMH: She  has a past medical history of Anemia, Degenerative disc disease, lumbar, Dyslipidemia, Endometrial cancer (CMS/HCC) (9/27/2019), GERD (gastroesophageal reflux disease), Hyperlipidemia, Menopause, OAB (overactive bladder), Osteopenia, PMB (postmenopausal bleeding), Skin cancer, Urge incontinence, Vaginal atrophy, Wears dentures, and Wears reading eyeglasses.   PSxH: She  has a past surgical history that includes Tonsillectomy; Hand surgery (Right); Cyst Removal; Cataract extraction w/ intraocular lens  implant, bilateral; d&c hysteroscopy; Colonoscopy (2019); Eye surgery (Left, 09/05/2019); Eye surgery; exploratory laparotomy, total abdominal hysterectomy, salpingo oophorectomy with tumor debulking (Bilateral, 9/27/2019); Cystoscopy w/ ureteral stent placement (Bilateral, 9/27/2019); and Esophagogastroduodenoscopy (N/A, 2/20/2020).        Reported/Observed/Food/Nutrition Related History     RD spoke with Dr. Claudio outside of pt's room, states ok for pt's diet to e advanced as pt tolerates.    Pt states that she is tolerating clear liquids. Reports that she does not like the Boost Breeze supplements. States that at home she drinks either chocolate Minter Instant Breakfast High Protein or chocolate Boost mixed with milk. Reports that she drinks think q 3 weeks around the time of her chemotherapy treatment. States that she does not have any food allergies.  Requesting soft foods- soft texture and easy to digest. Reports that she had a good appetite PTA.       Anthropometrics   Height: 63 in  Weight: 131 lb per bed scale (2/21)  BMI: 23.2  BMI classification: Normal: 18.5-24.9kg/m2       UBW: ~132 lb per RD note (11/6/19)  Weight change: Pt reports that she lost weight after surgery in September 2019, but that she has been gaining this weigh back.     Date Weight (kg) Weight (lbs) Weight Method   2/21/2020 59.5 kg 131 lb 2.8 oz Bed scale   2/19/2020 52.617 kg 116 lb -   2/10/2020 52.617 kg 116 lb -   1/29/2020 52.844 kg 116 lb 8 oz -   1/8/2020 54.386 kg 119 lb 14.4 oz -   1/8/2020 54.386 kg 119 lb 14.4 oz -   1/7/2020 53.978 kg 119 lb -   12/18/2019 52.617 kg 116 lb -   12/3/2019 53.524 kg 118 lb -   11/27/2019 54.069 kg 119 lb 3.2 oz -   11/6/2019 53.978 kg 119 lb -   10/22/2019 55.021 kg 121 lb 4.8 oz -   10/22/2019 53.978 kg 119 lb -   10/10/2019 56.246 kg 124 lb -   9/28/2019 56.79 kg 125 lb 3.2 oz Standing scale       Labs reviewed   Labs reviewed: Yes  Replacing K+    Results from last 7 days   Lab Units 02/21/20  0630 02/20/20  0314 02/19/20  1027   SODIUM mmol/L 141 138 140   POTASSIUM mmol/L 3.3* 4.7 4.2   CHLORIDE mmol/L 109* 107 103   CO2 mmol/L 26.0 17.0* 15.0*   BUN mg/dL 24* 54* 49*   CREATININE mg/dL 0.74 0.92 1.04*   GLUCOSE mg/dL 100* 212* 200*   CALCIUM mg/dL 7.2* 7.2* 9.1   MAGNESIUM mg/dL  --  1.6  --      Results from last 7 days   Lab Units 02/21/20  0630 02/20/20  0443 02/20/20  0314 02/19/20  1027   WBC 10*3/mm3 11.22* 15.36* 15.43*  --    ALBUMIN g/dL  --   --   --  3.60         Lab Results   Lab Value Date/Time    HGBA1C 5.40 09/26/2019 1714     Medications reviewed   Medications reviewed: Yes  Pertinent: aricept, protonix, antibiotics  GTT: LR at 125 ml/hr  PRN: tylenol, atarax, melatonin, zofran, phenergan      Current Nutrition Prescription   PO: Diet Clear Liquid  Diet Soft Texture; Whole Foods; GI Soft  Oral Nutrition Supplement: Boost  Breeze 3x/day      Evaluation of Received Nutrient/Fluid Intake-PO:  50%/2 meals -- of clear liquids      Nutrition Diagnosis   2/21  Problem Inadequate oral intake   Etiology Diet order   Signs/Symptoms Clear liquids       Intervention   Intervention: Follow treatment progress, Care plan reviewed, Interview for preferences, Menu adjusted, Encourage intake, Supplement provided   -Will d/c Boost Breeze  -Will advance diet to GI soft/soft textures/whole foods  -Will send chocolate Boost Plus with 1 milk carton 3x/day      Goal:   General: Nutrition support treatment  PO: Advance diet       Monitoring/Evaluation:       Monitoring/Evaluation: Per protocol, PO intake, Supplement intake, Pertinent labs, Weight, GI status, Symptoms     Will Continue to follow per protocol  Elza Bonilla, MS RD/LD CNSC  Time Spent: 30 minutes

## 2020-02-21 NOTE — PLAN OF CARE
Problem: Patient Care Overview  Goal: Plan of Care Review  Outcome: Ongoing (interventions implemented as appropriate)  Flowsheets  Taken 2/21/2020 0600  Plan of Care Reviewed With: patient  Taken 2/21/2020 0648  Outcome Summary: pt rested on and off through the night. occasional complaints of pain, refused meds, positioning helped. VSS on 1 L NC while asleep, pt would desat to the 70s briefly while asleep. 3 dark BMs. adequate UOP.

## 2020-02-21 NOTE — PLAN OF CARE
Pt transfered from floor 0827 r/t critical Hgb 4.7 and dark tarry stools. 2 UPRBCs (4 units total for this admission). Recheck Hgb 9.2 @1100; 9.1 @1600. EGD @ 1400; clip placed on duodenal ulcer- see Dr. Brunner note. Will continue to closely monitor vitals and lab values. Q8 H/H planned. Spoke to Dr. Claudio and she is ok with using the PICC for lab draws r/t poor vascular access. VSS- HR 80s; SpO2 >98% on RA; SBP >110; afebrile.

## 2020-02-21 NOTE — PROGRESS NOTES
Intensive Care Follow-up     Hospital:  LOS: 1 day   Ms. Yni Siddiqui, 79 y.o. female is followed for:   Gastrointestinal bleeding        Subjective   Interval History:  The chart is been reviewed.  The patient continues to have some melena.  She has been afebrile overnight and her blood pressure has been stable.  Her pain is decently controlled.    The patient's past medical, surgical and social history were reviewed and updated in Epic as appropriate.        Objective     Infusions:    lactated ringers 75 mL/hr Last Rate: 75 mL/hr (02/21/20 1100)     Medications:    atorvastatin 10 mg Oral Daily   donepezil 10 mg Oral Nightly   pantoprazole 40 mg Intravenous BID   piperacillin-tazobactam 3.375 g Intravenous Q8H       Vital Sign Min/Max for last 24 hours  Temp  Min: 97 °F (36.1 °C)  Max: 98.7 °F (37.1 °C)   BP  Min: 97/51  Max: 139/65   Pulse  Min: 63  Max: 81   Resp  Min: 16  Max: 20   SpO2  Min: 92 %  Max: 100 %   Flow (L/min)  Min: 1  Max: 1       Input/Output for last 24 hour shift  02/20 0701 - 02/21 0700  In: 1982.2 [P.O.:1100; I.V.:53.6]  Out: 375 [Urine:375]      Objective:  General Appearance:  Comfortable, well-appearing and in no acute distress.    Vital signs: (most recent): Blood pressure 111/57, pulse 74, temperature 97 °F (36.1 °C), temperature source Axillary, resp. rate 20, weight 59.5 kg (131 lb 2.8 oz), SpO2 98 %, not currently breastfeeding.    HEENT: Normal HEENT exam.    Lungs:  Normal effort and normal respiratory rate.  Breath sounds clear to auscultation.  She is not in respiratory distress.  No decreased breath sounds.    Heart: Normal rate.  Regular rhythm.  S1 normal and S2 normal.  No murmur.   Abdomen: Abdomen is soft and non-distended.  There is no abdominal tenderness.     Extremities: Normal range of motion.  There is no dependent edema.    Neurological: Patient is alert and oriented to person, place and time.    Pupils:  Pupils are equal, round, and reactive to light.  Pupils  are equal.   Skin:  Pale.              Results from last 7 days   Lab Units 02/21/20  0630 02/21/20  0006 02/20/20  1639  02/20/20  0443 02/20/20  0314   WBC 10*3/mm3 11.22*  --   --   --  15.36* 15.43*   HEMOGLOBIN g/dL 8.1* 8.1* 9.1*   < > 4.7* 5.4*   PLATELETS 10*3/mm3 109*  --   --   --  122* 140    < > = values in this interval not displayed.     Results from last 7 days   Lab Units 02/21/20  0630 02/20/20  0314 02/19/20  1027   SODIUM mmol/L 141 138 140   POTASSIUM mmol/L 3.3* 4.7 4.2   CO2 mmol/L 26.0 17.0* 15.0*   BUN mg/dL 24* 54* 49*   CREATININE mg/dL 0.74 0.92 1.04*   MAGNESIUM mg/dL  --  1.6  --    GLUCOSE mg/dL 100* 212* 200*     Estimated Creatinine Clearance: 55.3 mL/min (by C-G formula based on SCr of 0.74 mg/dL).            I reviewed the patient's results and images.     Assessment/Plan   Impression        Gastrointestinal bleeding    GERD (gastroesophageal reflux disease)    Dyslipidemia    Endometrial cancer (CMS/HCC)    Anxiety disorder due to medical condition    Chemotherapy-induced neutropenia (CMS/HCC)    Acute blood loss anemia    Chemotherapy-induced neuropathy (CMS/HCC)       Plan        Decrease lactated Ringer's to 75 mL/h.  Replace potassium.  We will watch for any sign of hemodynamic instability or bleeding.  Plan to move to the floor when okay with Dr. Claudio.  Follow-up labs and orders have been placed.    Plan of care and goals reviewed with mulitdisciplinary/antibiotic stewardship team during rounds.   I discussed the patient's findings and my recommendations with patient and nursing staff       Amos Ram MD, [facility]  Pulmonology and Critical Care Medicine

## 2020-02-21 NOTE — PROGRESS NOTES
Discharge Planning Assessment  [facility]     Patient Name: Yin Siddiqui  MRN: [id number]  Today's Date: 2/21/2020    Admit Date: 2/19/2020    Discharge Needs Assessment     Row Name 02/21/20 1140       Living Environment    Lives With  spouse    Current Living Arrangements  home/apartment/condo    Primary Care Provided by  self    Provides Primary Care For  no one    Family Caregiver if Needed  spouse    Quality of Family Relationships  supportive    Able to Return to Prior Arrangements  yes       Resource/Environmental Concerns    Resource/Environmental Concerns  none    Transportation Concerns  car, none       Transition Planning    Patient/Family Anticipates Transition to  home    Patient/Family Anticipated Services at Transition  none    Transportation Anticipated  family or friend will provide       Discharge Needs Assessment    Readmission Within the Last 30 Days  no previous admission in last 30 days    Concerns to be Addressed  denies needs/concerns at this time    Equipment Currently Used at Home  walker, standard    Anticipated Changes Related to Illness  none    Equipment Needed After Discharge  none        Discharge Plan     Row Name 02/21/20 1141       Plan    Plan  Home    Patient/Family in Agreement with Plan  yes    Plan Comments  Spoke with patient and  at bedside.  Patient resides in [facility] and lives with her  in a patio home in [facility].  Prior to admission patient states she was independent with ADL's and mobility.  Patient has a walker at home that she does not use.  Patient has never used home health.  Patient plans to return home upon discharge and denies any needs.  CM will continue to follow.        Destination      Coordination has not been started for this encounter.      Durable Medical Equipment      Coordination has not been started for this encounter.      Dialysis/Infusion      Coordination has not been started for this  encounter.      Home Medical Care      Coordination has not been started for this encounter.      Therapy      Coordination has not been started for this encounter.      Community Resources      Coordination has not been started for this encounter.        Expected Discharge Date and Time     Expected Discharge Date Expected Discharge Time    Feb 26, 2020         Demographic Summary     Row Name 02/21/20 1140       General Information    Admission Type  inpatient    Arrived From  home    Referral Source  admission list    Reason for Consult  discharge planning    Preferred Language  English       Contact Information    Permission Granted to Share Info With          Functional Status    No documentation.       Psychosocial    No documentation.       Abuse/Neglect    No documentation.       Legal    No documentation.       Substance Abuse    No documentation.       Patient Forms    No documentation.           Macie Hernandez RN

## 2020-02-21 NOTE — PROGRESS NOTES
Gynecologic Oncology   Daily Progress Note    Chief Complaint: weakness, fatigue    Subjective   Patient overall doing better.  She still feels weak and fatigued.  She denies nausea.  She has had three dark stools overnight. She has not been out of bed.    Objective   Temp:  [98 °F (36.7 °C)-98.8 °F (37.1 °C)] 98 °F (36.7 °C)  Heart Rate:  [63-95] 65  Resp:  [16-20] 20  BP: ()/(45-83) 111/50  Vitals:    02/21/20 0530   BP: 111/50   Pulse: 65   Resp:    Temp:    SpO2: 99%     I/O last 3 completed shifts:  In: 2530 [P.O.:1100; Blood:1380; IV Piggyback:50]  Out: 1275 [Urine:675; Stool:600]     GENERAL: Alert, well-appearing female in no apparent distress.    CARDIOVASCULAR: Normal rate, regular rhythm, no murmurs, rubs, or gallops.    RESPIRATORY: Clear to auscultation bilaterally, normal respiratory effort  GASTROINTESTINAL:  Soft, appropriately tender, non-distended, no rebound or guarding.    GENITOURINARY: No giles.  SKIN:  Warm, dry, well-perfused.    PSYCHIATRIC: AO x3, with appropriate affect, normal thought processes  EXREMITIES: Symmetric. No peripheral edema.    Lab Results   Component Value Date    WBC 15.36 (H) 02/20/2020    HGB 8.1 (L) 02/21/2020    HCT 23.8 (L) 02/21/2020    MCV 97.3 (H) 02/20/2020     (L) 02/20/2020    NEUTROABS 15.80 (H) 02/19/2020    GLUCOSE 212 (H) 02/20/2020    BUN 54 (H) 02/20/2020    CREATININE 0.92 02/20/2020    EGFRIFNONA 59 (L) 02/20/2020     02/20/2020    K 4.7 02/20/2020     02/20/2020    CO2 17.0 (L) 02/20/2020    MG 1.6 02/20/2020    CALCIUM 7.2 (L) 02/20/2020     Assessment/Plan   This is a 79 y.o. admitted 2/19/2020 for dehydration, bloody stools, weakness.     Sepsis of unknown origin  -Hypotensive with leukocytosis of 18,000 on arrival  -Sepsis bundle ordered  -S/p 1 L bolus on arrival to hospital  -Empiric antibiotics started-Vanc and Zosyn  -Urine cx and blood NDTD  -CXR performed  -C. Diff and ova/parasite all negative  -Management per ICU  team     Upper GI bleed  -Hct 20 on arrival, S/p 2u pRBCs 2/19/2020.  S/p 2u pRBC 2/21  -Continue protonix BID  -S/p EGD with clip of bleeding duodenal ulcer 2/20/2020  -Hct 57-78-47-27-27-24  -GI following      Uterine carcinosarcoma   -PICC RUE  -She was scheduled for cycle #6 chemotherapy 2/19/2020 but presented to the clinic and was ill with dehydration, hypotension, nausea, vomiting, diarrhea, chills, shortness of breath.  She was admitted directly from clinic.     Dispo: continue inpatient admission.  If stable today, will move back to the floor.    Sophie Pagan MD  02/21/20  6:06 AM    I saw and evaluated the patient. I agree with the findings and the plan of care as documented in the note.  Transfer to Floor.  Will ask hospitalist to take over as this is a medicine issue.  D/W Dr. Aviles.  If patient is meeting goals for discharge over the weekend, she can be discharged.  Otherwise, I would see her 2/24/2020.  She Duane has follow-up scheduled as detailed below.  Unclear if pt candidate for cycle 6/6 of chemo (due 2/19, held due to this admission/illness).  Contributing factors to ulcer in this pt included NSAIDs, ASA, and steroids with chemotherapy.  Patient was educated re: avoidance of these medications.  I further discussed this with Dr. Santos.  He thinks the patient could tolerate oral steroids and the typical steroids associated with chemotherapy without further GI bleed.  I am off this weekend, please call with questions or concerns.  Patient is to see me 2/26/2020 and appointment is already made.  If she is doing well, will consider final chemotherapy cycle 2/27/2020.  Otherwise, we would discontinue her chemotherapy as this is the last cycle and there may be decreased utility with delay greater than 2 weeks.    Dasha Claudio MD  02/21/20  12:35 PM

## 2020-02-22 LAB
ANION GAP SERPL CALCULATED.3IONS-SCNC: 8 MMOL/L (ref 5–15)
BASOPHILS # BLD AUTO: 0.01 10*3/MM3 (ref 0–0.2)
BASOPHILS NFR BLD AUTO: 0.1 % (ref 0–1.5)
BUN BLD-MCNC: 11 MG/DL (ref 8–23)
BUN/CREAT SERPL: 15.7 (ref 7–25)
CALCIUM SPEC-SCNC: 7.6 MG/DL (ref 8.6–10.5)
CHLORIDE SERPL-SCNC: 110 MMOL/L (ref 98–107)
CO2 SERPL-SCNC: 25 MMOL/L (ref 22–29)
CREAT BLD-MCNC: 0.7 MG/DL (ref 0.57–1)
DEPRECATED RDW RBC AUTO: 52.9 FL (ref 37–54)
EOSINOPHIL # BLD AUTO: 0.23 10*3/MM3 (ref 0–0.4)
EOSINOPHIL NFR BLD AUTO: 2.4 % (ref 0.3–6.2)
ERYTHROCYTE [DISTWIDTH] IN BLOOD BY AUTOMATED COUNT: 18.6 % (ref 12.3–15.4)
GFR SERPL CREATININE-BSD FRML MDRD: 81 ML/MIN/1.73
GLUCOSE BLD-MCNC: 109 MG/DL (ref 65–99)
HCT VFR BLD AUTO: 22.9 % (ref 34–46.6)
HCT VFR BLD AUTO: 23.8 % (ref 34–46.6)
HCT VFR BLD AUTO: 24.8 % (ref 34–46.6)
HCT VFR BLD AUTO: 24.8 % (ref 34–46.6)
HGB BLD-MCNC: 7.6 G/DL (ref 12–15.9)
HGB BLD-MCNC: 7.7 G/DL (ref 12–15.9)
HGB BLD-MCNC: 8.1 G/DL (ref 12–15.9)
HGB BLD-MCNC: 8.1 G/DL (ref 12–15.9)
IMM GRANULOCYTES # BLD AUTO: 0.13 10*3/MM3 (ref 0–0.05)
IMM GRANULOCYTES NFR BLD AUTO: 1.3 % (ref 0–0.5)
LYMPHOCYTES # BLD AUTO: 1.63 10*3/MM3 (ref 0.7–3.1)
LYMPHOCYTES NFR BLD AUTO: 16.8 % (ref 19.6–45.3)
MCH RBC QN AUTO: 31 PG (ref 26.6–33)
MCHC RBC AUTO-ENTMCNC: 32.7 G/DL (ref 31.5–35.7)
MCV RBC AUTO: 95 FL (ref 79–97)
MONOCYTES # BLD AUTO: 0.92 10*3/MM3 (ref 0.1–0.9)
MONOCYTES NFR BLD AUTO: 9.5 % (ref 5–12)
NEUTROPHILS # BLD AUTO: 6.79 10*3/MM3 (ref 1.7–7)
NEUTROPHILS NFR BLD AUTO: 69.9 % (ref 42.7–76)
NRBC BLD AUTO-RTO: 0.3 /100 WBC (ref 0–0.2)
PLATELET # BLD AUTO: 126 10*3/MM3 (ref 140–450)
PMV BLD AUTO: 9.9 FL (ref 6–12)
POTASSIUM BLD-SCNC: 4.2 MMOL/L (ref 3.5–5.2)
PROCALCITONIN SERPL-MCNC: 0.06 NG/ML (ref 0.1–0.25)
RBC # BLD AUTO: 2.61 10*6/MM3 (ref 3.77–5.28)
SODIUM BLD-SCNC: 143 MMOL/L (ref 136–145)
WBC NRBC COR # BLD: 9.71 10*3/MM3 (ref 3.4–10.8)

## 2020-02-22 PROCEDURE — 85018 HEMOGLOBIN: CPT | Performed by: INTERNAL MEDICINE

## 2020-02-22 PROCEDURE — 85014 HEMATOCRIT: CPT | Performed by: INTERNAL MEDICINE

## 2020-02-22 PROCEDURE — 84145 PROCALCITONIN (PCT): CPT | Performed by: INTERNAL MEDICINE

## 2020-02-22 PROCEDURE — 80048 BASIC METABOLIC PNL TOTAL CA: CPT | Performed by: INTERNAL MEDICINE

## 2020-02-22 PROCEDURE — 85025 COMPLETE CBC W/AUTO DIFF WBC: CPT | Performed by: INTERNAL MEDICINE

## 2020-02-22 PROCEDURE — 99233 SBSQ HOSP IP/OBS HIGH 50: CPT | Performed by: INTERNAL MEDICINE

## 2020-02-22 RX ADMIN — HYDROXYZINE HYDROCHLORIDE 25 MG: 25 TABLET, FILM COATED ORAL at 21:56

## 2020-02-22 RX ADMIN — DONEPEZIL HYDROCHLORIDE 10 MG: 10 TABLET, FILM COATED ORAL at 20:34

## 2020-02-22 RX ADMIN — PANTOPRAZOLE SODIUM 40 MG: 40 INJECTION, POWDER, FOR SOLUTION INTRAVENOUS at 08:52

## 2020-02-22 RX ADMIN — PANTOPRAZOLE SODIUM 40 MG: 40 INJECTION, POWDER, FOR SOLUTION INTRAVENOUS at 20:34

## 2020-02-22 RX ADMIN — ATORVASTATIN CALCIUM 10 MG: 10 TABLET, FILM COATED ORAL at 08:52

## 2020-02-22 RX ADMIN — MELATONIN TAB 5 MG 5 MG: 5 TAB at 21:56

## 2020-02-22 NOTE — PROGRESS NOTES
[facility] Medicine Services  PROGRESS NOTE    Patient Name: Yin Siddiqui  : 1940  MRN: [id number]    Date of Admission: 2020  Primary Care Physician: Manoj Brown MD    Subjective   Subjective     CC:ICU f/u for bleeding duodenal ulcer with shock    HPI: No acute events overnight, patient that she slept well, denies any abdominal pain, said that she is regaining of strength.  She did not have any more bloody bowel movements    Review of Systems  Gen- No fevers, chills  CV- No chest pain, palpitations  Resp- No cough, dyspnea  GI- No N/V/D, abd pain     All systems reviewed and are negative except as mentioned in HPI above  Objective   Objective     Vital Signs:   Temp:  [97 °F (36.1 °C)-98 °F (36.7 °C)] 98 °F (36.7 °C)  Heart Rate:  [67-89] 69  Resp:  [16-20] 16  BP: (111-139)/(53-70) 117/55        Physical Exam:  Constitutional: No acute distress, awake, alert  HENT: NCAT, mucous membranes moist  Respiratory: Clear to auscultation bilaterally, respiratory effort normal   Cardiovascular: RRR, no murmurs, rubs, or gallops, palpable pedal pulses bilaterally  Gastrointestinal: Positive bowel sounds, soft, nontender, nondistended  Musculoskeletal: No bilateral ankle edema  Psychiatric: Appropriate affect, cooperative  Neurologic: Oriented x 3, no focal deficits  Skin: No rashes    Results Reviewed:  Results from last 7 days   Lab Units 20  0134 2030  20  0443   WBC 10*3/mm3 9.71  --   --  11.22*  --  15.36*   HEMOGLOBIN g/dL 8.1*  8.1* 7.6* 8.1* 8.1*   < > 4.7*   HEMATOCRIT % 24.8*  24.8* 22.9* 24.6* 24.1*   < > 14.3*   PLATELETS 10*3/mm3 126*  --   --  109*  --  122*    < > = values in this interval not displayed.     Results from last 7 days   Lab Units 20  0630 20  0314 20  1027   SODIUM mmol/L 143  --  141 138 140   POTASSIUM mmol/L 4.2 3.8 3.3* 4.7 4.2    CHLORIDE mmol/L 110*  --  109* 107 103   CO2 mmol/L 25.0  --  26.0 17.0* 15.0*   BUN mg/dL 11  --  24* 54* 49*   CREATININE mg/dL 0.70  --  0.74 0.92 1.04*   GLUCOSE mg/dL 109*  --  100* 212* 200*   CALCIUM mg/dL 7.6*  --  7.2* 7.2* 9.1   ALT (SGPT) U/L  --   --   --   --  15   AST (SGOT) U/L  --   --   --   --  23     Estimated Creatinine Clearance: 55.3 mL/min (by C-G formula based on SCr of 0.7 mg/dL).    Microbiology Results Abnormal     Procedure Component Value - Date/Time    Blood Culture - Blood, Blood, PICC Line [[id number]] Collected:  02/19/20 2112    Lab Status:  Preliminary result Specimen:  Blood, PICC Line Updated:  02/21/20 2215     Blood Culture No growth at 2 days    Blood Culture - Blood, Arm, Left [[id number]] Collected:  02/19/20 2112    Lab Status:  Preliminary result Specimen:  Blood from Arm, Left Updated:  02/21/20 2215     Blood Culture No growth at 2 days    Narrative:       Aerobic bottle only      Urine Culture - Urine, Urine, Clean Catch [[id number]] Collected:  02/19/20 2149    Lab Status:  Final result Specimen:  Urine, Clean Catch Updated:  02/21/20 0925     Urine Culture <25,000 CFU/mL Mixed Jorge Isolated    Narrative:       Specimen contains mixed organisms of questionable pathogenicity which indicates contamination with commensal jorge.  Further identification is unlikely to provide clinically useful information.  Suggest recollection.    Gastrointestinal Panel, PCR - Stool, Per Rectum [[id number]]  (Normal) Collected:  02/20/20 0400    Lab Status:  Final result Specimen:  Stool from Per Rectum Updated:  02/20/20 1027     Campylobacter Not Detected     Plesiomonas shigelloides Not Detected     Salmonella Not Detected     Vibrio Not Detected     Vibrio cholerae Not Detected     Yersinia enterocolitica Not Detected     Enteroaggregative E. coli (EAEC) Not Detected     Enteropathogenic E. coli (EPEC) Not Detected     Enterotoxigenic E. coli (ETEC) lt/st Not Detected     Shiga-like  toxin-producing E. coli (STEC) stx1/stx2 Not Detected     E. coli O157 Not Detected     Shigella/Enteroinvasive E. coli (EIEC) Not Detected     Cryptosporidium Not Detected     Cyclospora cayetanensis Not Detected     Entamoeba histolytica Not Detected     Giardia lamblia Not Detected     Adenovirus F40/41 Not Detected     Astrovirus Not Detected     Norovirus GI/GII Not Detected     Rotavirus A Not Detected     Sapovirus (I, II, IV or V) Not Detected    Narrative:       If Aeromonas, Staphylococcus aureus or Bacillus cereus are suspected, please order [id number]: Stool Culture, Aeromonas, S aureus, B Cereus.    Clostridium Difficile Toxin - Stool, Per Rectum [[id number]] Collected:  02/20/20 0400    Lab Status:  Final result Specimen:  Stool from Per Rectum Updated:  02/20/20 0858    Narrative:       The following orders were created for panel order Clostridium Difficile Toxin - Stool, Per Rectum.  Procedure                               Abnormality         Status                     ---------                               -----------         ------                     Clostridium Difficile To...[[id number]]  Normal              Final result                 Please view results for these tests on the individual orders.    Clostridium Difficile Toxin, PCR - Stool, Per Rectum [[id number]]  (Normal) Collected:  02/20/20 0400    Lab Status:  Final result Specimen:  Stool from Per Rectum Updated:  02/20/20 0858     C. Difficile Toxins by PCR Not Detected    Narrative:       Performance characteristics of test not established for patients <2 years of age.  Negative for Toxigenic C. Difficile          Imaging Results (Last 24 Hours)     Procedure Component Value Units Date/Time    XR Chest 1 View [[id number]] Collected:  02/21/20 0946     Updated:  02/21/20 0948    Narrative:       CR Chest 1 Vw     INDICATION:    Sepsis    TECHNIQUE:   Single AP view the chest was obtained    COMPARISON:    9/26/2019    FINDINGS:  Since the  previous examination the heart, lungs and mediastinum show no changes. The aorta is tortuous. Heart size is normal. Both lungs are fully expanded and clear. Vascular markings are normal. No effusions are seen.      Impression:       No acute findings    Signer Name: Damian Dow MD   Signed: 2/21/2020 9:46 AM   Workstation Name: [id number]    [facility]               I have reviewed the medications:  Scheduled Meds:    atorvastatin 10 mg Oral Daily   donepezil 10 mg Oral Nightly   pantoprazole 40 mg Intravenous BID     Continuous Infusions:     PRN Meds:.•  acetaminophen  •  hydrOXYzine  •  melatonin  •  ondansetron  •  ondansetron ODT  •  potassium chloride **OR** potassium chloride **OR** potassium chloride  •  promethazine **OR** promethazine    Assessment/Plan   Assessment & Plan     Active Hospital Problems    Diagnosis  POA   • **Gastrointestinal bleeding [K92.2]  Yes   • Acute blood loss anemia [D62]  Yes   • 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        Brief Hospital Course to date:  Yin Siddiqui is a 79 y.o. female with a past medical history of endometrial cancer currently on chemotherapy, GERD, hyperlipidemia, anxiety who presented with nausea, vomiting, dehydration generalized weakness fatigue and abdominal pain.  Patient was admitted with sepsis, GI bleed with shock requiring ICU care.  Antibiotics were initiated,    Plan  SIRS vsSepsis  -On presentation patient had leukocytosis and was was hypotensive,   -Blood and urine cultures sent NGTD, C. difficile is negative  -Suspect hypotension was sec to GI bleed and leukocytosis was  likely reactive, in any case, she is immunocompromised and initiation of antibiotics is reasonable.   -She is currently on Zosyn, will get a procalcitonin and likely discontinue Zosyn and watch  -Discontinue IV fluids    Upper GI bleed secondary to duodenal ulcer  -Likely secondary to daily aspirin use and ongoing steroid therapy for chemo  -GI following, she is s/p EGD with clipping  -H. pylori is negative    Acute blood loss anemia  -Secondary to above, s/p 4 units PRBC  -H&H currently stable, continue to trend    Uterine carcinosarcoma  -Ongoing chemotherapy, will schedule for cycle #6 however this is currently on hold to above.  She is followed by Dr. Claudio  -Continue symptomatic management    Hyperlipidemia-continue statin    DVT Prophylaxis: Mechanical    Disposition: If H&H remains the same, she does well off antibiotics, will consider discharge in the a.m.    CODE STATUS:   Code Status and Medical Interventions:   Ordered at: 02/19/20 0959     Level Of Support Discussed With:    Patient     Code Status:    CPR     Medical Interventions (Level of Support Prior to Arrest):    Full       Electronically signed by Duc Means MD, 02/22/20, 8:33 AM.

## 2020-02-22 NOTE — PROGRESS NOTES
Adult Nutrition  Assessment/PES    Patient Name:  Yin Siddiqui  YOB: 1940  MRN: [id number]  Admit Date:  2/19/2020    Assessment Date:  2/22/2020    Reason for Assessment     Row Name 02/22/20 1556          Reason for Assessment    Reason For Assessment  -- education. 40 mins     Diagnosis  -- per notes this adm. Noted Gastroenterologist recs on 2/21 for pt to be on low residue/GI soft diet for 1 week.                   Nutrition Prescription Ordered     Row Name 02/22/20 1557          Nutrition Prescription PO    Current PO Diet  Soft Texture     Texture  Whole foods     Common Modifiers  GI Soft/Mobile with FL supplement 3x/d, which pt states she has been drinking.          Evaluation of Received Nutrient/Fluid Intake     Row Name 02/22/20 1558          PO Evaluation    Number of Meals  3     % PO Intake  50               Problem/Interventions:  Problem 1     Row Name 02/22/20 1558          Nutrition Diagnoses Problem 1    Problem 1  Inadequate Nutrient Intake     Etiology (related to)  MNT for Treatment/Condition     Signs/Symptoms (evidenced by)  PO Intake     Percent (%) intake recorded  50 %     Over number of meals  3               Intervention Goal     Row Name 02/22/20 1559          Intervention Goal    PO  Increase intake         Nutrition Intervention     Row Name 02/22/20 1559          Nutrition Intervention    RD/Tech Action  Follow Tx progress;Supplement provided;Encourage intake;Interview for preference         Nutrition Prescription     Row Name 02/22/20 1555          Nutrition Prescription PO    PO Prescription  Begin/change diet;Begin/change supplement     Supplement  -- will change FL supplement to prostat 3x/d while pt on low residue diet.     Common Modifiers  GI Soft/Mobile     Other Modifiers  Low residue as per GI notes     New PO Prescription Ordered?  Yes         Education/Evaluation     Row Name 02/22/20 1556          Education    Education  Provided education regarding  provided diet education to pt on low residue/GI soft diet and gave written materials. Advised pt to discuss with MD how long after d/c hospital she has need to follow this diet order. Pt receptive and with approp questions.        Monitor/Evaluation    Monitor  Per protocol           Electronically signed by:  Mirta March MS,RD,LD  02/22/20 4:01 PM

## 2020-02-22 NOTE — PLAN OF CARE
Problem: Patient Care Overview  Goal: Plan of Care Review  Flowsheets  Taken 2/22/2020 0652  Progress: improving  Outcome Summary: VSS. Pt ambulating to bathroom tonight. Stool still dark. Pt had no complaints of pain tonight. Pt on 2L NC tonight. Will continue to monitor.  Taken 2/21/2020 2021  Plan of Care Reviewed With: patient;spouse

## 2020-02-22 NOTE — PROGRESS NOTES
GI Daily Progress Note  Subjective:    Chief Complaint:   Follow-up bleeding duodenal ulcer with hemorrhagic shock    The patient's general wellbeing and strength are improved today.  She is tolerating liquids well, and tolerated soft diet tonight.  Evacuated a small amount of old dark blood today.    Objective:    /66 (BP Location: Left arm, Patient Position: Lying)   Pulse 70   Temp 98 °F (36.7 °C) (Oral)   Resp 18   Wt 59.5 kg (131 lb 2.8 oz)   SpO2 100%   BMI 23.24 kg/m²     Physical Exam   Constitutional: She is oriented to person, place, and time. She appears well-developed and well-nourished. No distress.   The patient appears chronically ill.   HENT:   Head: Normocephalic.   Mouth/Throat: No oropharyngeal exudate.   Eyes: Conjunctivae are normal. No scleral icterus.   Neck: Normal range of motion.   Cardiovascular: Normal rate and regular rhythm.   Pulmonary/Chest: Breath sounds normal. No stridor. No respiratory distress. She has no wheezes. She has no rales.   Abdominal: Soft. Bowel sounds are normal. She exhibits no distension and no mass. There is no tenderness. There is no guarding.   Genitourinary:   Genitourinary Comments: Deferred   Musculoskeletal: Normal range of motion. She exhibits no edema.   Neurological: She is alert and oriented to person, place, and time.   Skin: Skin is warm and dry. Capillary refill takes less than 2 seconds. No rash noted.   Psychiatric: She has a normal mood and affect. Her behavior is normal.   Nursing note and vitals reviewed.      Lab  Lab Results   Component Value Date    WBC 11.22 (H) 02/21/2020    HGB 8.1 (L) 02/21/2020    HGB 8.1 (L) 02/21/2020    HGB 9.1 (L) 02/20/2020    MCV 91.3 02/21/2020     (L) 02/21/2020    INR 1.00 09/15/2019       Lab Results   Component Value Date    GLUCOSE 100 (H) 02/21/2020    BUN 24 (H) 02/21/2020    CREATININE 0.74 02/21/2020    EGFRIFNONA 76 02/21/2020    BCR 32.4 (H) 02/21/2020     02/21/2020    K 3.3 (L)  02/21/2020    CO2 26.0 02/21/2020    CALCIUM 7.2 (L) 02/21/2020    ALBUMIN 3.60 02/19/2020    ALKPHOS 68 02/19/2020    BILITOT 0.3 02/19/2020    ALT 15 02/19/2020    AST 23 02/19/2020       Assessment:    1.  Acute duodenal ulcer with hemorrhage, attributed to daily full dose aspirin, with some contribution of corticosteroids she received with chemotherapy.  Postop day #1 Ovesco clip with hemostasis.  H. pylori biopsy is negative.  2.  Acute blood loss anemia, stable after transfusion.  Estimated at least 7 unit bleed.  BUN is much lower than yesterday, further evidence of cessation of bleeding.    Plan:    >> Needs twice daily PPI for 1 month, then daily for 3 months.  >> Stop NSAIDs (full dose aspirin).  >> Discussed with patient the small chance of obstruction from ulcer and Ovesco clip.  She will contact us or Dr. Mike with persistent nausea and vomiting.  Recommend low residue/soft diet for 1 week.  >> There is no contraindication to completing her last round of chemotherapy in the near future.    Anticipate home tomorrow.    Mark I. Brunner, MD  02/21/20  7:42 PM

## 2020-02-23 VITALS
DIASTOLIC BLOOD PRESSURE: 56 MMHG | OXYGEN SATURATION: 100 % | SYSTOLIC BLOOD PRESSURE: 117 MMHG | TEMPERATURE: 97.7 F | BODY MASS INDEX: 23.24 KG/M2 | RESPIRATION RATE: 18 BRPM | HEART RATE: 62 BPM | WEIGHT: 131.17 LBS

## 2020-02-23 LAB
ABO + RH BLD: NORMAL
ABO + RH BLD: NORMAL
BH BB BLOOD EXPIRATION DATE: NORMAL
BH BB BLOOD EXPIRATION DATE: NORMAL
BH BB BLOOD TYPE BARCODE: 6200
BH BB BLOOD TYPE BARCODE: 6200
BH BB DISPENSE STATUS: NORMAL
BH BB DISPENSE STATUS: NORMAL
BH BB PRODUCT CODE: NORMAL
BH BB PRODUCT CODE: NORMAL
BH BB UNIT NUMBER: NORMAL
BH BB UNIT NUMBER: NORMAL
HCT VFR BLD AUTO: 24.6 % (ref 34–46.6)
HGB BLD-MCNC: 7.8 G/DL (ref 12–15.9)
UNIT  ABO: NORMAL
UNIT  ABO: NORMAL
UNIT  RH: NORMAL
UNIT  RH: NORMAL

## 2020-02-23 PROCEDURE — 85018 HEMOGLOBIN: CPT | Performed by: INTERNAL MEDICINE

## 2020-02-23 PROCEDURE — 99239 HOSP IP/OBS DSCHRG MGMT >30: CPT | Performed by: INTERNAL MEDICINE

## 2020-02-23 PROCEDURE — 85014 HEMATOCRIT: CPT | Performed by: INTERNAL MEDICINE

## 2020-02-23 RX ORDER — PANTOPRAZOLE SODIUM 40 MG/1
TABLET, DELAYED RELEASE ORAL
Qty: 150 TABLET | Refills: 0 | Status: SHIPPED | OUTPATIENT
Start: 2020-02-23 | End: 2020-06-22

## 2020-02-23 RX ADMIN — PANTOPRAZOLE SODIUM 40 MG: 40 INJECTION, POWDER, FOR SOLUTION INTRAVENOUS at 08:32

## 2020-02-23 RX ADMIN — ATORVASTATIN CALCIUM 10 MG: 10 TABLET, FILM COATED ORAL at 08:32

## 2020-02-23 NOTE — PROGRESS NOTES
Case Management Discharge Note      Final Note: Plan is Home with family. Family will transport. Denies any discharge needs         Destination      No service has been selected for the patient.      Durable Medical Equipment      No service has been selected for the patient.      Dialysis/Infusion      No service has been selected for the patient.      Home Medical Care      No service has been selected for the patient.      Therapy      No service has been selected for the patient.      Community Resources      No service has been selected for the patient.             Final Discharge Disposition Code: 01 - home or self-care

## 2020-02-23 NOTE — PLAN OF CARE
Pt. Ambulated in krishna multiple times throughout the day. No c/o pain. Labs are stable. Plan is to go home tomorrow.

## 2020-02-23 NOTE — DISCHARGE SUMMARY
[facility] Medicine Services  DISCHARGE SUMMARY    Patient Name: Yin Siddiqui  : 1940  MRN: [id number]    Date of Admission: 2020  9:43 AM  Date of Discharge: 2020  Primary Care Physician: Manoj Brown MD    Consults     Date and Time Order Name Status Description    2020 0030 Inpatient Gastroenterology Consult Completed           Hospital Course     Presenting Problem:   Dehydration [E86.0]  GI bleed [K92.2]  Gastrointestinal bleeding, lower [K92.2]  H/O: upper GI bleed [Z87.19]    Active Hospital Problems    Diagnosis  POA   • **Gastrointestinal bleeding [K92.2]  Yes   • Acute blood loss anemia [D62]  Yes   • 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      Hospital Course:  Yin Siddiqui is a 79 y.o. female with a past medical history of endometrial cancer currently on chemotherapy, GERD, hyperlipidemia, anxiety who presented with nausea, vomiting, dehydration generalized weakness fatigue and abdominal pain.  Patient was admitted with sepsis, GI bleed with shock requiring ICU care.  Antibiotics were initiated,     Plan  SIRS vsSepsis  -On presentation patient had leukocytosis and was was hypotensive,   -Blood and urine cultures were sent NGTD, C. difficile is negative  -Suspect hypotension was sec to GI bleed and leukocytosis was likely reactive, in any case, she is immunocompromised and initiation of antibiotics was reasonable.   -Repeat procalcitonin was low, we d/c Zosyn and she did well off abx, no indication to continue this at discharge     Upper GI bleed secondary to duodenal ulcer  -Likely  secondary to daily aspirin use and ongoing steroid therapy for chemo  -GI was consulted, she underwent EGD with clipping, H. pylori is negative  -Recommend BID PPI for 1 month then daily for 3 months, she should be on low residue/soft diet for 1 week (dietitian has provided resources)  -She was also informed that she has a small chance of obstruction from ulcer and Ovesco clip and showed contact Dr. Claudio or GI if she develops persistent nausea and vomiting     Acute blood loss anemia  -Secondary to above, s/p 4 units PRBC  -H&H has remained low but stable     Uterine carcinosarcoma  -Ongoing chemotherapy, will schedule for cycle #6 however this is currently on hold to above.  She will follow-up with Dr. Claudio as scheduled     Hyperlipidemia-continue statin     Discharge Follow Up Recommendations for outpatient labs/diagnostics:  Follow-up with Dr. Claudio as scheduled for 2/26/2020    Day of Discharge     HPI: No acute events overnight, patient says she slept well and feels much better and is ready to go home.    Review of Systems  Gen- No fevers, chills  CV- No chest pain, palpitations  Resp- No cough, dyspnea  GI- No N/V/D, abd pain    All systems reviewed and negative except mentioned in HPI above    Vital Signs:   Temp:  [97.7 °F (36.5 °C)-98 °F (36.7 °C)] 97.7 °F (36.5 °C)  Heart Rate:  [62-68] 62  Resp:  [16-18] 18  BP: (117-121)/(55-56) 117/56     Physical Exam:  Constitutional: Chronically ill-appearing lady, no acute distress, awake, alert  HENT: NCAT, mucous membranes moist  Respiratory: Clear to auscultation bilaterally, respiratory effort normal   Cardiovascular: RRR, 3/6 systolic  murmurs, rubs, or gallops, palpable pedal pulses bilaterally  Gastrointestinal: Positive bowel sounds, soft, nontender, nondistended  Musculoskeletal: No bilateral ankle edema  Psychiatric: Appropriate affect, cooperative  Neurologic: Oriented x 3, no focal deficits  Skin: No rashes    Pertinent  and/or Most Recent  Results     Results from last 7 days   Lab Units 02/23/20  0005 02/22/20  1559 02/22/20  0435 02/22/20  0134 02/21/20  1830 02/21/20  0630 02/21/20  0006  02/20/20  0443 02/20/20  0314 02/19/20  1027 02/19/20  1026   WBC 10*3/mm3  --   --  9.71  --   --  11.22*  --   --  15.36* 15.43*  --  17.84*   HEMOGLOBIN g/dL 7.8* 7.7* 8.1*  8.1* 7.6* 8.1* 8.1* 8.1*   < > 4.7* 5.4*  --  6.3*   HEMATOCRIT % 24.6* 23.8* 24.8*  24.8* 22.9* 24.6* 24.1* 23.8*   < > 14.3* 16.7*  --  20.8*   PLATELETS 10*3/mm3  --   --  126*  --   --  109*  --   --  122* 140  --  236   SODIUM mmol/L  --   --  143  --   --  141  --   --   --  138 140  --    POTASSIUM mmol/L  --   --  4.2  --  3.8 3.3*  --   --   --  4.7 4.2  --    CHLORIDE mmol/L  --   --  110*  --   --  109*  --   --   --  107 103  --    CO2 mmol/L  --   --  25.0  --   --  26.0  --   --   --  17.0* 15.0*  --    BUN mg/dL  --   --  11  --   --  24*  --   --   --  54* 49*  --    CREATININE mg/dL  --   --  0.70  --   --  0.74  --   --   --  0.92 1.04*  --    GLUCOSE mg/dL  --   --  109*  --   --  100*  --   --   --  212* 200*  --    CALCIUM mg/dL  --   --  7.6*  --   --  7.2*  --   --   --  7.2* 9.1  --     < > = values in this interval not displayed.     Results from last 7 days   Lab Units 02/19/20  1027   BILIRUBIN mg/dL 0.3   ALK PHOS U/L 68   ALT (SGPT) U/L 15   AST (SGOT) U/L 23           Invalid input(s): TG, LDLCALC, LDLREALC  Results from last 7 days   Lab Units 02/22/20  0435 02/20/20  0314 02/19/20 2112   PROCALCITONIN ng/mL 0.06*  --   --    LACTATE mmol/L  --  7.9* 2.3*       Brief Urine Lab Results  (Last result in the past 365 days)      Color   Clarity   Blood   Leuk Est   Nitrite   Protein   CREAT   Urine HCG        02/19/20 2149 Yellow Clear Negative Small (1+) Negative Negative               Microbiology Results Abnormal     Procedure Component Value - Date/Time    Blood Culture - Blood, Blood, PICC Line [[id number]] Collected:  02/19/20 2112    Lab Status:   Preliminary result Specimen:  Blood, PICC Line Updated:  02/22/20 2215     Blood Culture No growth at 3 days    Blood Culture - Blood, Arm, Left [[id number]] Collected:  02/19/20 2112    Lab Status:  Preliminary result Specimen:  Blood from Arm, Left Updated:  02/22/20 2215     Blood Culture No growth at 3 days    Narrative:       Aerobic bottle only      Urine Culture - Urine, Urine, Clean Catch [[id number]] Collected:  02/19/20 2149    Lab Status:  Final result Specimen:  Urine, Clean Catch Updated:  02/21/20 0925     Urine Culture <25,000 CFU/mL Mixed Jorge Isolated    Narrative:       Specimen contains mixed organisms of questionable pathogenicity which indicates contamination with commensal jorge.  Further identification is unlikely to provide clinically useful information.  Suggest recollection.    Gastrointestinal Panel, PCR - Stool, Per Rectum [[id number]]  (Normal) Collected:  02/20/20 0400    Lab Status:  Final result Specimen:  Stool from Per Rectum Updated:  02/20/20 1027     Campylobacter Not Detected     Plesiomonas shigelloides Not Detected     Salmonella Not Detected     Vibrio Not Detected     Vibrio cholerae Not Detected     Yersinia enterocolitica Not Detected     Enteroaggregative E. coli (EAEC) Not Detected     Enteropathogenic E. coli (EPEC) Not Detected     Enterotoxigenic E. coli (ETEC) lt/st Not Detected     Shiga-like toxin-producing E. coli (STEC) stx1/stx2 Not Detected     E. coli O157 Not Detected     Shigella/Enteroinvasive E. coli (EIEC) Not Detected     Cryptosporidium Not Detected     Cyclospora cayetanensis Not Detected     Entamoeba histolytica Not Detected     Giardia lamblia Not Detected     Adenovirus F40/41 Not Detected     Astrovirus Not Detected     Norovirus GI/GII Not Detected     Rotavirus A Not Detected     Sapovirus (I, II, IV or V) Not Detected    Narrative:       If Aeromonas, Staphylococcus aureus or Bacillus cereus are suspected, please order [id number]: Stool Culture,  Aeromonas, S aureus, B Cereus.    Clostridium Difficile Toxin - Stool, Per Rectum [[id number]] Collected:  02/20/20 0400    Lab Status:  Final result Specimen:  Stool from Per Rectum Updated:  02/20/20 0858    Narrative:       The following orders were created for panel order Clostridium Difficile Toxin - Stool, Per Rectum.  Procedure                               Abnormality         Status                     ---------                               -----------         ------                     Clostridium Difficile To...[[id number]]  Normal              Final result                 Please view results for these tests on the individual orders.    Clostridium Difficile Toxin, PCR - Stool, Per Rectum [[id number]]  (Normal) Collected:  02/20/20 0400    Lab Status:  Final result Specimen:  Stool from Per Rectum Updated:  02/20/20 0858     C. Difficile Toxins by PCR Not Detected    Narrative:       Performance characteristics of test not established for patients <2 years of age.  Negative for Toxigenic C. Difficile          Imaging Results (All)     Procedure Component Value Units Date/Time    XR Chest 1 View [[id number]] Collected:  02/21/20 0946     Updated:  02/21/20 0948    Narrative:       CR Chest 1 Vw     INDICATION:    Sepsis    TECHNIQUE:   Single AP view the chest was obtained    COMPARISON:    9/26/2019    FINDINGS:  Since the previous examination the heart, lungs and mediastinum show no changes. The aorta is tortuous. Heart size is normal. Both lungs are fully expanded and clear. Vascular markings are normal. No effusions are seen.      Impression:       No acute findings    Signer Name: Damian Dow MD   Signed: 2/21/2020 9:46 AM   Workstation Name: [id number]    [facility]          Plan for Follow-up of Pending Labs/Results: Following up with Dr. Claudio on 2/26   Order Current Status    Blood Culture - Blood, Arm, Left Preliminary result    Blood Culture - Blood, Blood, PICC Line  Preliminary result        Discharge Details        Discharge Medications      New Medications      Instructions Start Date   pantoprazole 40 MG EC tablet  Commonly known as:  PROTONIX   Take 1 tablet by mouth 2 (Two) Times a Day for 30 days, THEN 1 tablet Daily for 90 days.   Start Date:  February 23, 2020        Continue These Medications      Instructions Start Date   CALCIUM-VITAMIN D PO   1 tablet, Oral, Daily      dexamethasone 4 MG tablet  Commonly known as:  DECADRON   Take 5 tabs the night before chemo then take 2 tabs in the morning on days 2, 3 & 4. Take with food.      donepezil 10 MG tablet  Commonly known as:  ARICEPT   10 mg, Oral, Nightly      fish oil 1000 MG capsule capsule   1,000 mg, Oral, Daily With Breakfast      loratadine 10 MG tablet  Commonly known as:  CLARITIN   Take 1 tab the night before chemo then take 1 tab the morning of chemo.      MIRALAX powder  Generic drug:  polyethylene glycol   17 g, Oral, Daily      MULTIVITAMIN ADULT PO   1 tablet, Oral, Daily      ondansetron 4 MG tablet  Commonly known as:  ZOFRAN   4 mg, Oral, Every 6 Hours PRN      ondansetron 8 MG tablet  Commonly known as:  ZOFRAN   8 mg, Oral, 3 Times Daily PRN      promethazine 25 MG tablet  Commonly known as:  PHENERGAN   25 mg, Oral, Every 6 Hours PRN      simvastatin 10 MG tablet  Commonly known as:  ZOCOR   10 mg, Oral, Nightly      STOOL SOFTENER 100 MG capsule  Generic drug:  docusate sodium   200 mg, Oral, 2 Times Daily PRN      traMADol 50 MG tablet  Commonly known as:  ULTRAM   50 mg, Oral, Every 6 Hours PRN      XYLIMELTS 550 MG disk  Generic drug:  Xylitol   Mouth/Throat, Nightly             No Known Allergies      Discharge Disposition: Home  Home or Self Care    Diet:  Hospital:  Diet Order   Procedures   • Diet Soft Texture; Whole Foods; GI Soft, Low Fiber / Low Residue       Activity: As tolerated    Restrictions or Other Recommendations:  Continue low residual diet       CODE STATUS:    Code Status and  Medical Interventions:   Ordered at: 02/19/20 0959     Level Of Support Discussed With:    Patient     Code Status:    CPR     Medical Interventions (Level of Support Prior to Arrest):    Full       Future Appointments   Date Time Provider Department Center   2/24/2020  2:00 PM  [facility] LITZY CHAIR 2  [facility]   2/26/2020  9:15 AM Dasha Claudio MD [facility]   2/27/2020  9:30 AM CHAIR 6  [facility]   4/6/2020 10:30 AM Geovanny Mart APRN [facility] None   7/1/2020 10:00 AM Salas Lam MD [facility] None       Time Spent on Discharge:  40 minutes    Electronically signed by Duc Means MD, 02/23/20, 8:37 AM.

## 2020-02-24 ENCOUNTER — READMISSION MANAGEMENT (OUTPATIENT)
Dept: CALL CENTER | Facility: HOSPITAL | Age: 80
End: 2020-02-24

## 2020-02-24 LAB
BACTERIA SPEC AEROBE CULT: NORMAL
BACTERIA SPEC AEROBE CULT: NORMAL

## 2020-02-24 NOTE — OUTREACH NOTE
Prep Survey      Responses   Facility patient discharged from?  [facility]   Is patient eligible?  Yes   Discharge diagnosis  GI bleed   Does the patient have one of the following disease processes/diagnoses(primary or secondary)?  Other   Does the patient have Home health ordered?  No   Is there a DME ordered?  No   Comments regarding appointments  multiple apmts   Prep survey completed?  Yes          Shirlene Sanders RN

## 2020-02-24 NOTE — PROGRESS NOTES
Yin Siddiqui  [id number]  1940    Reason for visit: Carcinosarcoma of the uterus, toxicity evaluation, evaluation for possible chemotherapy, hospital follow up    History of present illness:  The patient is a 79 y.o. female who presents today for treatment and evaluation of the above issues.  She is accompanied by her .    Patient was admitted to the hospital 2/19-23 after she was seen in the clinic and had weakness, nausea, vomiting, dehydration, diarrhea with bright red bleeding.  During her admission her hemoglobin dropped precipitously and she received a total of 4 units of packed red blood cells.  Gastroenterology was consulted and a bleeding ulcer was found on EGD which was clipped and treated on 2/20/2020.  She was discharged home in stable condition on 2/23/2020 and returns today for evaluation for chemotherapy.  Today she is feeling much better.  She is on a soft diet recommended by GI and is doing well with it.  She denies nausea/vomiting.  She has small bowel movements after she eats anything.  Denies melenic stools or bright red bleeding.  She reports fatigue and weakness and naps in the afternoon. She does not do any cleaning or cooking.  Patient is continued on Protonix twice daily and reports that she needs refill on her pretreatment steroids.    Oncologic History:     Endometrial cancer (CMS/HCC)    9/18/2019 Initial Diagnosis     Endometrial biopsy by Dr. Lam due to recurrent postmenopausal bleeding. Pathology showed cytologic atypia in background of extensive tumor necrosis.  =36.6. Referred to Gyn Oncology      9/18/2019 Imaging     CT abd/pelvis IMPRESSION:  1. Approximately 7 cm central uterine mass, strongly vascular along its  posterior lateral right margin at approximately 7:00 as noted above. No  evidence of invasion into the surrounding soft tissues.  2. No evidence of adenopathy or other metastatic disease.  3. 2 cm nonenhancing water density right ovarian  cyst.  4. Incidentally noted small hepatic cyst and normal variant bilateral  renal parapelvic cysts.  Pre-op CXR ARIS      9/27/2019 Surgery     Exploratory laparotomy, Type 1 radical hysterectomy, BSO, pelvic lymph node dissection, omentectomy, cystoscopy with bilateral temporary ureteral stents.     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 Sarcomatous component is undifferentiated. Cytology negative, no LVSI, nodes negative. Stage IA         Chemotherapy     OP UTERINE PACLitaxel / CARBOplatin (Q21D)  Dose reduction related to neutropenia.      11/19/2019 - 12/5/2019 Radiation     Radiation OncologyTreatment Course:  Yin Siddiqui received 3000 cGy in 5 fractions to vagina via High Dose Radiation - HDR.      12/11/2019 -  Chemotherapy     OP FILGRASTIM (NEUPOGEN)          Past Medical History:   Diagnosis Date   • Anemia    • Degenerative disc disease, lumbar     hands and knees    • Dyslipidemia    • Endometrial cancer (CMS/HCC) 9/27/2019   • GERD (gastroesophageal reflux disease)    • Hyperlipidemia    • Menopause    • OAB (overactive bladder)    • Osteopenia    • PMB (postmenopausal bleeding)    • Skin cancer    • Urge incontinence    • Vaginal atrophy    • Wears dentures     upper   • Wears reading eyeglasses      Past Surgical History:   Procedure Laterality Date   • CATARACT EXTRACTION W/ INTRAOCULAR LENS  IMPLANT, BILATERAL     • COLONOSCOPY  2019   • CYST REMOVAL      left wrist   • CYSTOSCOPY W/ URETERAL STENT PLACEMENT Bilateral 9/27/2019    Procedure: CYSTOSCOPY WITH BILATERAL TEMPORARY URETERAL STENT INSERTION;  Surgeon: Dasha Claudio MD;  Location: [facility];  Service: Gynecology   • D&C HYSTEROSCOPY     • ENDOSCOPY N/A 2/20/2020    Procedure: ESOPHAGOGASTRODUODENOSCOPY;  Surgeon: Brunner, Mark I, MD;   Location: [facility] ENDOSCOPY;  Service: Gastroenterology;  Laterality: N/A;   • EXPLORATORY LAPAROTOMY, TOTAL ABDOMINAL HYSTERECTOMY, SALPINGO OOPHORECTOMY WITH TUMOR DEBULKING Bilateral 9/27/2019    Procedure: EXPLORATORY LAPAROTOMY TYPE 1, RADICAL TOTAL ABDOMINAL HYSTERECTOMY, BILATERAL SALPINGO OOPHORECTOMY, PELVIC LYMPH NODE DISSECTION, OMENTUMECTOMY;  Surgeon: Dasha Claudio MD;  Location: [facility] OR;  Service: Gynecology   • EYE SURGERY Left 09/05/2019    macular hole    • EYE SURGERY      as a child for muscular problems  x 3   • HAND SURGERY Right     bone removed    • TONSILLECTOMY       MEDICATIONS: The current medication list was reviewed with the patient and updated in the EMR this date per the Medical Assistant. Medication dosages and frequencies were confirmed to be accurate.      Allergies:  has No Known Allergies.    Social History:   Social History     Socioeconomic History   • Marital status:      Spouse name: Not on file   • Number of children: Not on file   • Years of education: Not on file   • Highest education level: Not on file   Tobacco Use   • Smoking status: Never Smoker   • Smokeless tobacco: Never Used   Substance and Sexual Activity   • Alcohol use: No   • Drug use: No   • Sexual activity: Defer     Partners: Male     Birth control/protection: Post-menopausal     Family History:    Family History   Problem Relation Age of Onset   • Breast cancer Mother 78   • Ovarian cancer Neg Hx        Health Maintenance:    Health Maintenance   Topic Date Due   • TDAP/TD VACCINES (1 - Tdap) 10/03/1951   • Pneumococcal Vaccine Once at 65 Years Old  10/03/2007   • MEDICARE ANNUAL WELLNESS  04/10/2017   • DXA SCAN  05/23/2019   • ZOSTER VACCINE (2 of 2) 08/12/2019   • LIPID PANEL  09/26/2019   • MAMMOGRAM  05/15/2021   • COLONOSCOPY  04/16/2029   • INFLUENZA VACCINE  Completed     Review of Systems   Constitutional: Negative for chills, fatigue, fever and unexpected weight change.   HENT:  "Negative for ear pain and rhinorrhea.    Eyes: Negative for pain, redness and visual disturbance.   Respiratory: Negative for cough and shortness of breath.    Cardiovascular: Negative for chest pain and leg swelling.   Gastrointestinal: Negative for abdominal pain, constipation and diarrhea.   Endocrine: Negative for polydipsia and polyphagia.   Genitourinary: Negative for flank pain, pelvic pain and vaginal bleeding.   Musculoskeletal: Positive for arthralgias. Negative for myalgias.   Skin: Negative for pallor and rash.   Allergic/Immunologic: Negative for food allergies and immunocompromised state.   Neurological: Positive for weakness. Negative for dizziness, syncope and light-headedness.   Hematological: Negative for adenopathy. Does not bruise/bleed easily.   Psychiatric/Behavioral: Negative for dysphoric mood and suicidal ideas.     Physical Exam    Vitals:    02/26/20 0929   BP: 134/63   Pulse: 76   Resp: 16   Temp: 97.6 °F (36.4 °C)   TempSrc: Temporal   SpO2: 99%   Weight: 51.6 kg (113 lb 11.2 oz)   Height: 160 cm (62.99\")     Body mass index is 20.15 kg/m².    Wt Readings from Last 3 Encounters:   02/26/20 51.6 kg (113 lb 11.2 oz)   02/21/20 59.5 kg (131 lb 2.8 oz)   02/19/20 52.6 kg (116 lb)     GENERAL: Alert, thin appearing female appearing her stated age.  HEENT: Sclera anicteric. Head normocephalic, atraumatic. Alopecia.   NECK: Trachea midline, supple, without masses.  No thyromegaly.   BREASTS: Deferred  CARDIOVASCULAR: Normal rate, regular rhythm, no murmurs, rubs, or gallops.  No peripheral edema.  RESPIRATORY: Clear to auscultation bilaterally, normal respiratory effort  BACK:  No CVA tenderness, no vertebral tenderness on palpation  GASTROINTESTINAL:  Abdomen is soft,non-distended, no rebound or guarding, no masses, or hernias. No HSM.   SKIN:  Warm, dry, well-perfused.  All visible areas intact.  No rashes, lesions, ulcers.  PSYCHIATRIC: AO x3, with appropriate affect, normal thought " processes.  NEUROLOGIC: No focal deficits. Moves extremities well.  MUSCULOSKELETAL: Normal gait and station.   EXTREMITIES:   No cyanosis, clubbing, symmetric.  PICC RUE.  LYMPHATICS:  No inguinal or cervical adenopathy appreciated     PELVIC exam:    GYNECOLOGIC:  Deferred     ECOG PS 0    PROCEDURES:  none    Diagnostic Data:      Lab Results   Component Value Date    WBC 5.30 02/26/2020    HGB 9.4 (L) 02/26/2020    HCT 28.7 (L) 02/26/2020    MCV 95.0 02/26/2020     02/26/2020    NEUTROABS 4.10 02/26/2020    GLUCOSE 109 (H) 02/22/2020    BUN 11 02/22/2020    CREATININE 0.70 02/22/2020    EGFRIFNONA 81 02/22/2020     02/22/2020    K 4.2 02/22/2020     (H) 02/22/2020    CO2 25.0 02/22/2020    MG 1.7 02/21/2020    CALCIUM 7.6 (L) 02/22/2020    ALBUMIN 3.60 02/19/2020    AST 23 02/19/2020    ALT 15 02/19/2020    BILITOT 0.3 02/19/2020     Lab Results   Component Value Date     8.1 01/29/2020     8.9 01/08/2020     9.6 12/18/2019     9.9 11/27/2019     36.6 09/15/2019         Assessment/Plan   This is a 79 y.o. woman who presents for toxicity evaluation while on chemotherapy for carcinosarcoma of the uterus.  Encounter Diagnoses   Name Primary?   • Endometrial cancer (CMS/HCC) Yes   • Chemotherapy-induced neutropenia (CMS/HCC)    • Chemotherapy-induced neuropathy (CMS/HCC)    • Gastrointestinal hemorrhage associated with duodenal ulcer    • Hospital discharge follow-up      Uterine carcinosarcoma  - Cycle #6 of 6 Carboplatin/Paclitaxel planned for tomorrow  - PICC RUE; d/c after IVF administration post chemo  - We will plan for CT scan 1 month after treatment.  Will call with results.  - Follow-up for survivorship in 3 months time     Chemotherapy induced neutropenia   - Neulasta OBI, EMLA cream to decrease discomfort with application    Chemotherapy-induced neuropathy  -Improved with IV fluids which is not typical. No neuropathy complaints today.    Chemotherapy-induced  pain  -Discussed that she can no longer take ibuprofen/naporoxen/aspoirin  -Patient reports tylenol is not always adequate. Will send Rx for tylenol 3    Acute blood loss  Duodenal ulcer  -Status post EGD with clip on 2/20/2020  -Status post 4 units of blood during admission 2/19-2/23  -H/H 2/26: 9.4/28.7  -Continue soft diet and Protonix BID; recommended primary care provider address insurance coverage with Protonix as this is not a cancer related issue.  -Recommended follow up with PCP as scheduled.    Pain assessment was performed today as a part of patient’s care.  For patients with pain related to surgery, gynecologic malignancy or cancer treatment, the plan is as noted in the assessment/plan.  For patients with pain not related to these issues, they are to seek any further needed care from a more appropriate provider, such as PCP.    No orders of the defined types were placed in this encounter.    Follow up: 3 months for survivorship.    Patient was seen and examined with Dr. Pagan,  resident, who performed portions of the examination and documentation for this patient's care under my direct supervision.  I agree with the above documentation and plan.    Dasha Claudio MD  02/26/20  1:04 PM

## 2020-02-25 ENCOUNTER — READMISSION MANAGEMENT (OUTPATIENT)
Dept: CALL CENTER | Facility: HOSPITAL | Age: 80
End: 2020-02-25

## 2020-02-26 ENCOUNTER — LAB (OUTPATIENT)
Dept: LAB | Facility: HOSPITAL | Age: 80
End: 2020-02-26

## 2020-02-26 ENCOUNTER — OFFICE VISIT (OUTPATIENT)
Dept: GYNECOLOGIC ONCOLOGY | Facility: CLINIC | Age: 80
End: 2020-02-26

## 2020-02-26 VITALS
BODY MASS INDEX: 20.14 KG/M2 | TEMPERATURE: 97.6 F | DIASTOLIC BLOOD PRESSURE: 63 MMHG | SYSTOLIC BLOOD PRESSURE: 134 MMHG | WEIGHT: 113.7 LBS | HEIGHT: 63 IN | OXYGEN SATURATION: 99 % | HEART RATE: 76 BPM | RESPIRATION RATE: 16 BRPM

## 2020-02-26 DIAGNOSIS — C54.1 ENDOMETRIAL CANCER (HCC): ICD-10-CM

## 2020-02-26 DIAGNOSIS — Z09 HOSPITAL DISCHARGE FOLLOW-UP: ICD-10-CM

## 2020-02-26 DIAGNOSIS — T45.1X5A CHEMOTHERAPY-INDUCED NEUROPATHY (HCC): ICD-10-CM

## 2020-02-26 DIAGNOSIS — T45.1X5A CHEMOTHERAPY-INDUCED NEUTROPENIA (HCC): ICD-10-CM

## 2020-02-26 DIAGNOSIS — G62.0 CHEMOTHERAPY-INDUCED NEUROPATHY (HCC): ICD-10-CM

## 2020-02-26 DIAGNOSIS — D70.1 CHEMOTHERAPY-INDUCED NEUTROPENIA (HCC): ICD-10-CM

## 2020-02-26 DIAGNOSIS — K26.4 GASTROINTESTINAL HEMORRHAGE ASSOCIATED WITH DUODENAL ULCER: ICD-10-CM

## 2020-02-26 DIAGNOSIS — C54.1 ENDOMETRIAL CANCER (HCC): Primary | ICD-10-CM

## 2020-02-26 LAB
ALBUMIN SERPL-MCNC: 3.5 G/DL (ref 3.5–5.2)
ALBUMIN/GLOB SERPL: 1.5 G/DL
ALP SERPL-CCNC: 75 U/L (ref 39–117)
ALT SERPL W P-5'-P-CCNC: 12 U/L (ref 1–33)
ANION GAP SERPL CALCULATED.3IONS-SCNC: 15 MMOL/L (ref 5–15)
AST SERPL-CCNC: 20 U/L (ref 1–32)
BILIRUB SERPL-MCNC: 0.3 MG/DL (ref 0.2–1.2)
BUN BLD-MCNC: 10 MG/DL (ref 8–23)
BUN/CREAT SERPL: 11.1 (ref 7–25)
CALCIUM SPEC-SCNC: 8.7 MG/DL (ref 8.6–10.5)
CANCER AG125 SERPL QL: 7 U/ML (ref 0–38.1)
CHLORIDE SERPL-SCNC: 107 MMOL/L (ref 98–107)
CO2 SERPL-SCNC: 20 MMOL/L (ref 22–29)
CREAT BLD-MCNC: 0.9 MG/DL (ref 0.57–1)
ERYTHROCYTE [DISTWIDTH] IN BLOOD BY AUTOMATED COUNT: 20.7 % (ref 12.3–15.4)
GFR SERPL CREATININE-BSD FRML MDRD: 61 ML/MIN/1.73
GLOBULIN UR ELPH-MCNC: 2.3 GM/DL
GLUCOSE BLD-MCNC: 145 MG/DL (ref 65–99)
HCT VFR BLD AUTO: 28.7 % (ref 34–46.6)
HGB BLD-MCNC: 9.4 G/DL (ref 12–15.9)
LYMPHOCYTES # BLD AUTO: 1.1 10*3/MM3 (ref 0.7–3.1)
LYMPHOCYTES NFR BLD AUTO: 20.8 % (ref 19.6–45.3)
MCH RBC QN AUTO: 31.1 PG (ref 26.6–33)
MCHC RBC AUTO-ENTMCNC: 32.8 G/DL (ref 31.5–35.7)
MCV RBC AUTO: 95 FL (ref 79–97)
MONOCYTES # BLD AUTO: 0.1 10*3/MM3 (ref 0.1–0.9)
MONOCYTES NFR BLD AUTO: 2.5 % (ref 5–12)
NEUTROPHILS # BLD AUTO: 4.1 10*3/MM3 (ref 1.7–7)
NEUTROPHILS NFR BLD AUTO: 76.7 % (ref 42.7–76)
PLATELET # BLD AUTO: 247 10*3/MM3 (ref 140–450)
PMV BLD AUTO: 7.2 FL (ref 6–12)
POTASSIUM BLD-SCNC: 3.5 MMOL/L (ref 3.5–5.2)
PROT SERPL-MCNC: 5.8 G/DL (ref 6–8.5)
RBC # BLD AUTO: 3.02 10*6/MM3 (ref 3.77–5.28)
SODIUM BLD-SCNC: 142 MMOL/L (ref 136–145)
WBC NRBC COR # BLD: 5.3 10*3/MM3 (ref 3.4–10.8)

## 2020-02-26 PROCEDURE — 36415 COLL VENOUS BLD VENIPUNCTURE: CPT

## 2020-02-26 PROCEDURE — 86304 IMMUNOASSAY TUMOR CA 125: CPT

## 2020-02-26 PROCEDURE — 85025 COMPLETE CBC W/AUTO DIFF WBC: CPT

## 2020-02-26 PROCEDURE — 99215 OFFICE O/P EST HI 40 MIN: CPT | Performed by: OBSTETRICS & GYNECOLOGY

## 2020-02-26 PROCEDURE — 80053 COMPREHEN METABOLIC PANEL: CPT

## 2020-02-26 RX ORDER — ACETAMINOPHEN AND CODEINE PHOSPHATE 300; 30 MG/1; MG/1
1 TABLET ORAL EVERY 4 HOURS PRN
Qty: 20 TABLET | Refills: 0 | Status: SHIPPED | OUTPATIENT
Start: 2020-02-26 | End: 2020-06-03 | Stop reason: ALTCHOICE

## 2020-02-26 RX ORDER — DEXAMETHASONE 4 MG/1
TABLET ORAL
Qty: 11 TABLET | Refills: 0 | Status: SHIPPED | OUTPATIENT
Start: 2020-02-26 | End: 2020-04-01

## 2020-02-27 ENCOUNTER — HOSPITAL ENCOUNTER (OUTPATIENT)
Dept: ONCOLOGY | Facility: HOSPITAL | Age: 80
Setting detail: INFUSION SERIES
Discharge: HOME | End: 2020-02-27

## 2020-02-27 ENCOUNTER — TELEPHONE (OUTPATIENT)
Dept: ONCOLOGY | Facility: CLINIC | Age: 80
End: 2020-02-27

## 2020-02-27 VITALS
WEIGHT: 115 LBS | SYSTOLIC BLOOD PRESSURE: 107 MMHG | HEIGHT: 63 IN | HEART RATE: 69 BPM | DIASTOLIC BLOOD PRESSURE: 52 MMHG | RESPIRATION RATE: 22 BRPM | TEMPERATURE: 97.8 F | BODY MASS INDEX: 20.38 KG/M2

## 2020-02-27 DIAGNOSIS — C54.1 ENDOMETRIAL CANCER (HCC): Primary | ICD-10-CM

## 2020-02-27 PROCEDURE — 25010000002 DEXAMETHASONE PER 1 MG: Performed by: OBSTETRICS & GYNECOLOGY

## 2020-02-27 PROCEDURE — 25010000002 PACLITAXEL PER 30 MG: Performed by: OBSTETRICS & GYNECOLOGY

## 2020-02-27 PROCEDURE — 25010000002 PEGFILGRASTIM 6 MG/0.6ML PREFILLED SYRINGE KIT: Performed by: OBSTETRICS & GYNECOLOGY

## 2020-02-27 PROCEDURE — 96377 APPLICATON ON-BODY INJECTOR: CPT

## 2020-02-27 PROCEDURE — 25010000002 DIPHENHYDRAMINE PER 50 MG: Performed by: OBSTETRICS & GYNECOLOGY

## 2020-02-27 PROCEDURE — 25010000002 PALONOSETRON PER 25 MCG: Performed by: OBSTETRICS & GYNECOLOGY

## 2020-02-27 PROCEDURE — 96375 TX/PRO/DX INJ NEW DRUG ADDON: CPT

## 2020-02-27 PROCEDURE — 96415 CHEMO IV INFUSION ADDL HR: CPT

## 2020-02-27 PROCEDURE — 96413 CHEMO IV INFUSION 1 HR: CPT

## 2020-02-27 PROCEDURE — 25010000002 FOSAPREPITANT PER 1 MG: Performed by: OBSTETRICS & GYNECOLOGY

## 2020-02-27 PROCEDURE — 96367 TX/PROPH/DG ADDL SEQ IV INF: CPT

## 2020-02-27 PROCEDURE — 25010000002 CARBOPLATIN PER 50 MG: Performed by: OBSTETRICS & GYNECOLOGY

## 2020-02-27 PROCEDURE — 96417 CHEMO IV INFUS EACH ADDL SEQ: CPT

## 2020-02-27 PROCEDURE — 96366 THER/PROPH/DIAG IV INF ADDON: CPT

## 2020-02-27 RX ORDER — PALONOSETRON 0.05 MG/ML
0.25 INJECTION, SOLUTION INTRAVENOUS ONCE
Status: COMPLETED | OUTPATIENT
Start: 2020-02-27 | End: 2020-02-27

## 2020-02-27 RX ORDER — PALONOSETRON 0.05 MG/ML
0.25 INJECTION, SOLUTION INTRAVENOUS ONCE
Status: CANCELLED | OUTPATIENT
Start: 2020-02-27

## 2020-02-27 RX ORDER — FAMOTIDINE 10 MG/ML
20 INJECTION, SOLUTION INTRAVENOUS AS NEEDED
Status: CANCELLED | OUTPATIENT
Start: 2020-02-27

## 2020-02-27 RX ORDER — DIPHENHYDRAMINE HYDROCHLORIDE 50 MG/ML
50 INJECTION INTRAMUSCULAR; INTRAVENOUS AS NEEDED
Status: CANCELLED | OUTPATIENT
Start: 2020-02-27

## 2020-02-27 RX ORDER — FAMOTIDINE 10 MG/ML
20 INJECTION, SOLUTION INTRAVENOUS ONCE
Status: COMPLETED | OUTPATIENT
Start: 2020-02-27 | End: 2020-02-27

## 2020-02-27 RX ORDER — SODIUM CHLORIDE 9 MG/ML
250 INJECTION, SOLUTION INTRAVENOUS ONCE
Status: COMPLETED | OUTPATIENT
Start: 2020-02-27 | End: 2020-02-27

## 2020-02-27 RX ORDER — SODIUM CHLORIDE 9 MG/ML
250 INJECTION, SOLUTION INTRAVENOUS ONCE
Status: CANCELLED | OUTPATIENT
Start: 2020-02-27

## 2020-02-27 RX ORDER — FAMOTIDINE 10 MG/ML
20 INJECTION, SOLUTION INTRAVENOUS ONCE
Status: CANCELLED | OUTPATIENT
Start: 2020-02-27

## 2020-02-27 RX ADMIN — PALONOSETRON HYDROCHLORIDE 0.25 MG: 0.25 INJECTION INTRAVENOUS at 10:00

## 2020-02-27 RX ADMIN — DEXAMETHASONE SODIUM PHOSPHATE 20 MG: 4 INJECTION, SOLUTION INTRAMUSCULAR; INTRAVENOUS at 10:02

## 2020-02-27 RX ADMIN — PEGFILGRASTIM 6 MG: KIT SUBCUTANEOUS at 14:43

## 2020-02-27 RX ADMIN — SODIUM CHLORIDE 250 ML: 9 INJECTION, SOLUTION INTRAVENOUS at 09:57

## 2020-02-27 RX ADMIN — FAMOTIDINE 20 MG: 10 INJECTION, SOLUTION INTRAVENOUS at 10:02

## 2020-02-27 RX ADMIN — PACLITAXEL 275 MG: 6 INJECTION, SOLUTION INTRAVENOUS at 10:50

## 2020-02-27 RX ADMIN — DIPHENHYDRAMINE HYDROCHLORIDE 50 MG: 50 INJECTION INTRAMUSCULAR; INTRAVENOUS at 10:02

## 2020-02-27 RX ADMIN — CARBOPLATIN 440 MG: 10 INJECTION, SOLUTION INTRAVENOUS at 14:03

## 2020-02-27 RX ADMIN — SODIUM CHLORIDE 150 MG: 9 INJECTION, SOLUTION INTRAVENOUS at 10:20

## 2020-02-27 NOTE — TELEPHONE ENCOUNTER
Mr. Siddiqui called they are next door for treatment, he was calling to ask if Nazanin could get the numbing cream for his wife before injection.     Please call [phone]

## 2020-02-28 ENCOUNTER — INFUSION (OUTPATIENT)
Dept: ONCOLOGY | Facility: HOSPITAL | Age: 80
End: 2020-02-28

## 2020-02-28 VITALS
SYSTOLIC BLOOD PRESSURE: 121 MMHG | RESPIRATION RATE: 16 BRPM | HEART RATE: 75 BPM | TEMPERATURE: 97.9 F | DIASTOLIC BLOOD PRESSURE: 52 MMHG

## 2020-02-28 DIAGNOSIS — C54.1 ENDOMETRIAL CANCER (HCC): ICD-10-CM

## 2020-02-28 DIAGNOSIS — E86.0 DEHYDRATION: Primary | ICD-10-CM

## 2020-02-28 PROCEDURE — 96360 HYDRATION IV INFUSION INIT: CPT

## 2020-02-28 RX ADMIN — SODIUM CHLORIDE 1000 ML: 9 INJECTION, SOLUTION INTRAVENOUS at 14:38

## 2020-03-02 ENCOUNTER — INFUSION (OUTPATIENT)
Dept: ONCOLOGY | Facility: HOSPITAL | Age: 80
End: 2020-03-02

## 2020-03-02 VITALS
HEART RATE: 71 BPM | SYSTOLIC BLOOD PRESSURE: 110 MMHG | RESPIRATION RATE: 16 BRPM | DIASTOLIC BLOOD PRESSURE: 71 MMHG | TEMPERATURE: 99.5 F

## 2020-03-02 DIAGNOSIS — C54.1 ENDOMETRIAL CANCER (HCC): Primary | ICD-10-CM

## 2020-03-02 PROCEDURE — 96360 HYDRATION IV INFUSION INIT: CPT

## 2020-03-02 RX ORDER — SODIUM CHLORIDE 9 MG/ML
999 INJECTION, SOLUTION INTRAVENOUS ONCE
Status: CANCELLED | OUTPATIENT
Start: 2020-03-02

## 2020-03-02 RX ORDER — SODIUM CHLORIDE 9 MG/ML
999 INJECTION, SOLUTION INTRAVENOUS ONCE
Status: COMPLETED | OUTPATIENT
Start: 2020-03-02 | End: 2020-03-02

## 2020-03-02 RX ADMIN — SODIUM CHLORIDE 999 ML: 9 INJECTION, SOLUTION INTRAVENOUS at 14:34

## 2020-03-03 ENCOUNTER — READMISSION MANAGEMENT (OUTPATIENT)
Dept: CALL CENTER | Facility: HOSPITAL | Age: 80
End: 2020-03-03

## 2020-03-03 NOTE — OUTREACH NOTE
Medical Week 2 Survey      Responses   [facility] patient discharged from?  [facility]   Does the patient have one of the following disease processes/diagnoses(primary or secondary)?  Other   Week 2 attempt successful?  Yes   Call start time  1401   Revoke  Decline to participate [they have an onolgist]   Call end time  1402          Anum Aranda RN

## 2020-03-04 ENCOUNTER — INFUSION (OUTPATIENT)
Dept: ONCOLOGY | Facility: HOSPITAL | Age: 80
End: 2020-03-04

## 2020-03-04 VITALS
TEMPERATURE: 97.6 F | RESPIRATION RATE: 16 BRPM | SYSTOLIC BLOOD PRESSURE: 119 MMHG | HEART RATE: 75 BPM | DIASTOLIC BLOOD PRESSURE: 77 MMHG

## 2020-03-04 DIAGNOSIS — C54.1 ENDOMETRIAL CANCER (HCC): Primary | ICD-10-CM

## 2020-03-04 PROCEDURE — 96360 HYDRATION IV INFUSION INIT: CPT

## 2020-03-04 RX ORDER — SODIUM CHLORIDE 9 MG/ML
999 INJECTION, SOLUTION INTRAVENOUS ONCE
Status: CANCELLED | OUTPATIENT
Start: 2020-03-04

## 2020-03-04 RX ORDER — SODIUM CHLORIDE 9 MG/ML
999 INJECTION, SOLUTION INTRAVENOUS ONCE
Status: COMPLETED | OUTPATIENT
Start: 2020-03-04 | End: 2020-03-04

## 2020-03-04 RX ADMIN — SODIUM CHLORIDE 999 ML: 9 INJECTION, SOLUTION INTRAVENOUS at 14:30

## 2020-03-09 ENCOUNTER — INFUSION (OUTPATIENT)
Dept: ONCOLOGY | Facility: HOSPITAL | Age: 80
End: 2020-03-09

## 2020-03-09 DIAGNOSIS — C54.1 ENDOMETRIAL CANCER (HCC): ICD-10-CM

## 2020-03-09 DIAGNOSIS — C54.1 ENDOMETRIAL CANCER (HCC): Primary | ICD-10-CM

## 2020-03-09 DIAGNOSIS — Z45.2 PICC (PERIPHERALLY INSERTED CENTRAL CATHETER) REMOVAL: Primary | ICD-10-CM

## 2020-03-09 LAB
BASOPHILS # BLD AUTO: 0.07 10*3/MM3 (ref 0–0.2)
BASOPHILS NFR BLD AUTO: 0.6 % (ref 0–1.5)
DEPRECATED RDW RBC AUTO: 61.2 FL (ref 37–54)
EOSINOPHIL # BLD AUTO: 0.19 10*3/MM3 (ref 0–0.4)
EOSINOPHIL NFR BLD AUTO: 1.6 % (ref 0.3–6.2)
ERYTHROCYTE [DISTWIDTH] IN BLOOD BY AUTOMATED COUNT: 17.2 % (ref 12.3–15.4)
HCT VFR BLD AUTO: 31.9 % (ref 34–46.6)
HGB BLD-MCNC: 9.5 G/DL (ref 12–15.9)
IMM GRANULOCYTES # BLD AUTO: 0.07 10*3/MM3 (ref 0–0.05)
IMM GRANULOCYTES NFR BLD AUTO: 0.6 % (ref 0–0.5)
LYMPHOCYTES # BLD AUTO: 1.17 10*3/MM3 (ref 0.7–3.1)
LYMPHOCYTES NFR BLD AUTO: 9.7 % (ref 19.6–45.3)
MCH RBC QN AUTO: 30.1 PG (ref 26.6–33)
MCHC RBC AUTO-ENTMCNC: 29.8 G/DL (ref 31.5–35.7)
MCV RBC AUTO: 100.9 FL (ref 79–97)
MONOCYTES # BLD AUTO: 0.87 10*3/MM3 (ref 0.1–0.9)
MONOCYTES NFR BLD AUTO: 7.2 % (ref 5–12)
NEUTROPHILS # BLD AUTO: 9.71 10*3/MM3 (ref 1.7–7)
NEUTROPHILS NFR BLD AUTO: 80.3 % (ref 42.7–76)
NRBC BLD AUTO-RTO: 0 /100 WBC (ref 0–0.2)
PLATELET # BLD AUTO: 197 10*3/MM3 (ref 140–450)
PMV BLD AUTO: 10.8 FL (ref 6–12)
RBC # BLD AUTO: 3.16 10*6/MM3 (ref 3.77–5.28)
WBC NRBC COR # BLD: 12.08 10*3/MM3 (ref 3.4–10.8)

## 2020-03-09 PROCEDURE — 36592 COLLECT BLOOD FROM PICC: CPT

## 2020-03-09 PROCEDURE — 85025 COMPLETE CBC W/AUTO DIFF WBC: CPT

## 2020-03-11 ENCOUNTER — TELEPHONE (OUTPATIENT)
Dept: GYNECOLOGIC ONCOLOGY | Facility: CLINIC | Age: 80
End: 2020-03-11

## 2020-03-16 ENCOUNTER — TELEPHONE (OUTPATIENT)
Dept: GYNECOLOGIC ONCOLOGY | Facility: CLINIC | Age: 80
End: 2020-03-16

## 2020-03-16 NOTE — TELEPHONE ENCOUNTER
Pt's  called for [facility] lab results.  I called him back.  No answer.  I left LVM with brief information. No interventions needed.   If he or pt has questions advised they call the office.

## 2020-03-18 ENCOUNTER — TELEPHONE (OUTPATIENT)
Dept: GYNECOLOGIC ONCOLOGY | Facility: CLINIC | Age: 80
End: 2020-03-18

## 2020-03-19 ENCOUNTER — HOSPITAL ENCOUNTER (OUTPATIENT)
Dept: CT IMAGING | Facility: HOSPITAL | Age: 80
Discharge: HOME | End: 2020-03-19
Admitting: OBSTETRICS & GYNECOLOGY

## 2020-03-19 DIAGNOSIS — C54.1 ENDOMETRIAL CANCER (HCC): ICD-10-CM

## 2020-03-19 PROCEDURE — 74177 CT ABD & PELVIS W/CONTRAST: CPT

## 2020-03-19 PROCEDURE — 63710000001 BARIUM 2 % SUSPENSION: Performed by: OBSTETRICS & GYNECOLOGY

## 2020-03-19 PROCEDURE — 25010000002 IOPAMIDOL 61 % SOLUTION: Performed by: OBSTETRICS & GYNECOLOGY

## 2020-03-19 PROCEDURE — A9270 NON-COVERED ITEM OR SERVICE: HCPCS | Performed by: OBSTETRICS & GYNECOLOGY

## 2020-03-19 PROCEDURE — 71260 CT THORAX DX C+: CPT

## 2020-03-19 RX ADMIN — BARIUM SULFATE 450 ML: 21 SUSPENSION ORAL at 13:00

## 2020-03-19 RX ADMIN — IOPAMIDOL 85 ML: 612 INJECTION, SOLUTION INTRAVENOUS at 14:40

## 2020-03-19 NOTE — TELEPHONE ENCOUNTER
Pt's  called and wanted to know if it is safe for pt to go ahead with ct scan tomorrow.  We discussed that there is no guarantee that she will not contract a virus, specifically COVID-19.  If she does go she can wear a mask and practice social distancing and frequent hand washing.  He verbalized understanding and thinks pt is going to proceed with scan today.

## 2020-03-20 ENCOUNTER — TELEPHONE (OUTPATIENT)
Dept: GYNECOLOGIC ONCOLOGY | Facility: CLINIC | Age: 80
End: 2020-03-20

## 2020-03-20 NOTE — TELEPHONE ENCOUNTER
----- Message from Dasha Claudio MD sent at 3/19/2020  5:55 PM EDT -----  Please notify patient of negative CT scan of chest abdomen pelvis.  Thank you    ----- Message -----  From: Interface, Rad Results [place] In  Sent: 3/19/2020   5:35 PM EDT  To: Dasha Claudio MD    03/20/2020:  I called pt.  No answer.  I LVM that scan was negative and to call back on next business day for details.

## 2020-04-01 ENCOUNTER — TELEPHONE (OUTPATIENT)
Dept: GYNECOLOGIC ONCOLOGY | Facility: CLINIC | Age: 80
End: 2020-04-01

## 2020-04-01 NOTE — TELEPHONE ENCOUNTER
Patient and  called with questions regarding infection risks due to recent completion of chemotherapy and COVID-19. All questions answered to their satisfaction.

## 2020-04-01 NOTE — PROGRESS NOTES
TELEMEDICINE FOLLOW-UP NOTE    PATIENT:                                                      Yin Siddiqui  MEDICAL RECORD #:                        [id number]  :                                                          1940  COMPLETION DATE:    2019  DIAGNOSIS:     Endometrial cancer (CMS/HCC)  - FIGO Stage I (cT1, cN0, cM0)    Time Devoted to Visit: 20 min including time to review her previous imaging and records, with 75% devoted to direct discussion.    Reason for Visit:  I personally contacted Yin Siddiqui  today in an effort to screen for coronavirus symptoms and also to perform her scheduled follow-up visit remotely in light of the coronavirus pandemic.  With respect to her specific risks for coronavirus, her screen is as follows:    COVID-19 RISK SCREEN     1. Has the patient had close contact without PPE with a lab confirmed COVID-19 (+) person or a person under investigation (PUI) for COVID-19 infection?  -- No     2. Has the patient provided care or had close contact with COVID-19(+) patient in a healthcare facility? --  No      She also denied any new respiratory symptoms or fever within a month 14 days.    I counseled her regarding safe practices for minimizing risk for infection including hand-washing, self-isolation, limiting contacts, and we also discussed what to do should she develop symptoms including fever, chills, new cough, shortness of breath, or new body aches.      BRIEF HISTORY:  Mrs. Siddiqui is a 79 y.o. 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  She has completed adjuvant chemotherapy and a course of brachytherapy.  Prior to her 6th and final cycle of carboplatin/paclitaxel, she required hospitalization for anemia secondary to a bleeding duodenal ulcer.  She underwent EGD and clip, and has since been recovering well at home without melena, becky bloody stools, or other GI complaints.  She denies  any vaginal bleeding, discharge, genitourinary symptoms, or pain.  Her biggest issue today is related to her ongoing fatigue and generalized weakness.  She has received three IV infusions over the past month.  She does believe, however, that she has turned a corner over the past 2 weeks and feels slightly more energized than usual.  She is maintaining a stable weight, and states that she is eating well and drinking plenty of fluids.  She underwent repeat CT scans on 3/19/2020, which were negative for evidence of disease.  She was grateful to be offered a telemedicine follow-up today.    MEDICATIONS: Medication reconciliation for the patient was reviewed and confirmed in the electronic medical record.    Review of Systems   Constitutional: Positive for fatigue.   Musculoskeletal: Positive for arthralgias.   Neurological: Positive for extremity weakness (generalized) and numbness (peripheral neuropathy).   Psychiatric/Behavioral: The patient is nervous/anxious (r/t fear of illness to COVID19).    All other systems reviewed and are negative.      KPS 80%    Physical Exam   Unable to perform as visit was conducted via telephone.  Mrs. Siddiqui not have compatible video capability.    IMAGING:  I have personally reviewed the following imaging studies:  CT chest/abdomen/pelvis 3/19/2020:    IMPRESSION:  1. Status post hysterectomy without local recurrence of soft tissue  nodularity or bulky pelvic adenopathy.  2. No evidence for distant metastatic involvement with hepatic steatosis  and a stable right hepatic lobe simple hepatic cyst.  3. Stable appearance of advanced degenerative changes of the spine  including sclerotic areas with endplate associated findings of likely  osteoarthrosis warranting follow-up on subsequent imaging.  This report was finalized on 3/19/2020 5:32 PM by Dr. Iron Roe.      IMPRESSION:  Mrs. 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  She  is 4 months status-post brachytherapy to the vaginal cuff in an effort to limit her risk of developing a vaginal recurrence.  She completed adjuvant chemotherapy last month.  She has had a complete response to treatments based on most recent imaging.  From a symptomatic standpoint, she is improving and is without evidence of radiation related toxicities.  She is scheduled for survivorship follow-up in June.  Given the significant high risk features of her cancer, we will plan to continue every 3-month surveillance visits alternating between GYN onc and radiation oncology.  We also discussed surveillance imaging and expectations for response to treatment.      RECOMMENDATIONS:  Given that today's visit was conducted via telemedicine in light of the COVID19 pandemic and associated restrictions, I will schedule Mrs. Siddiqui to return to our clinic in approximately 2 months for follow-up and exam.    Return in about 8 weeks (around 6/3/2020) for Office Visit.    Geovanny Mart, APRN

## 2020-04-03 ENCOUNTER — TRANSCRIBE ORDERS (OUTPATIENT)
Dept: ADMINISTRATIVE | Facility: HOSPITAL | Age: 80
End: 2020-04-03

## 2020-04-03 ENCOUNTER — TRANSCRIBE ORDERS (OUTPATIENT)
Dept: OBSTETRICS AND GYNECOLOGY | Facility: CLINIC | Age: 80
End: 2020-04-03

## 2020-04-03 DIAGNOSIS — Z12.31 VISIT FOR SCREENING MAMMOGRAM: Primary | ICD-10-CM

## 2020-04-06 ENCOUNTER — HOSPITAL ENCOUNTER (OUTPATIENT)
Dept: RADIATION ONCOLOGY | Facility: HOSPITAL | Age: 80
Setting detail: RADIATION/ONCOLOGY SERIES
Discharge: HOME | End: 2020-04-06

## 2020-04-06 ENCOUNTER — OFFICE VISIT (OUTPATIENT)
Dept: RADIATION ONCOLOGY | Facility: HOSPITAL | Age: 80
End: 2020-04-06

## 2020-04-06 DIAGNOSIS — C54.1 ENDOMETRIAL CANCER (HCC): Primary | ICD-10-CM

## 2020-06-03 ENCOUNTER — HOSPITAL ENCOUNTER (OUTPATIENT)
Dept: RADIATION ONCOLOGY | Facility: HOSPITAL | Age: 80
Setting detail: RADIATION/ONCOLOGY SERIES
Discharge: HOME | End: 2020-06-03

## 2020-06-03 ENCOUNTER — OFFICE VISIT (OUTPATIENT)
Dept: RADIATION ONCOLOGY | Facility: HOSPITAL | Age: 80
End: 2020-06-03

## 2020-06-03 ENCOUNTER — CLINICAL SUPPORT (OUTPATIENT)
Dept: GYNECOLOGIC ONCOLOGY | Facility: CLINIC | Age: 80
End: 2020-06-03

## 2020-06-03 VITALS
SYSTOLIC BLOOD PRESSURE: 151 MMHG | OXYGEN SATURATION: 95 % | DIASTOLIC BLOOD PRESSURE: 73 MMHG | HEART RATE: 76 BPM | WEIGHT: 110.2 LBS | BODY MASS INDEX: 19.52 KG/M2 | TEMPERATURE: 96.7 F | RESPIRATION RATE: 18 BRPM

## 2020-06-03 VITALS
RESPIRATION RATE: 12 BRPM | SYSTOLIC BLOOD PRESSURE: 152 MMHG | HEART RATE: 73 BPM | BODY MASS INDEX: 19.66 KG/M2 | DIASTOLIC BLOOD PRESSURE: 73 MMHG | WEIGHT: 111 LBS | TEMPERATURE: 96.7 F

## 2020-06-03 DIAGNOSIS — C54.1 ENDOMETRIAL CANCER (HCC): Primary | ICD-10-CM

## 2020-06-03 DIAGNOSIS — C54.1 ENDOMETRIAL CANCER (HCC): ICD-10-CM

## 2020-06-03 PROCEDURE — G0463 HOSPITAL OUTPT CLINIC VISIT: HCPCS | Performed by: RADIOLOGY

## 2020-06-03 PROCEDURE — 99215 OFFICE O/P EST HI 40 MIN: CPT | Performed by: NURSE PRACTITIONER

## 2020-06-03 RX ORDER — LORAZEPAM 1 MG/1
TABLET ORAL
COMMUNITY
Start: 2020-04-08 | End: 2020-12-09

## 2020-06-03 NOTE — PROGRESS NOTES
FOLLOW UP NOTE    PATIENT:                                                      Yin Siddiqui  MEDICAL RECORD #:                        [id number]  :                                                          1940  COMPLETION DATE:    2019  DIAGNOSIS:     Uterine Carcinosarcoma   - FIGO Stage I (cT1, cN0, cM0)  - FIGO Stage IA (pT1a, pN0, cM0)      BRIEF HISTORY:  Ms. Siddiqui returns to clinic today for a follow-up related to her stage 1A uterine carcinosarcoma.  She underwent a hysterectomy and staging in 2019, and this was followed by adjuvant vaginal brachytherapy which was interdigitated with her first 2 cycles of systemic chemotherapy, which she finished in 2020.  At the timing of her final cycle of chemotherapy, she was admitted briefly for an acute GI bleed and was found to have a duodenal ulceration.  This has been managed and she is now doing well with stable blood counts, and no residual symptoms other than fatigue.  She denies any gynecologic complaints or symptoms.  Her only main symptom is urinary urgency and incontinence at times.  She was seen earlier today in the Gynecologic Oncology clinic for her first survivorship visit.    MEDICATIONS: Medication reconciliation for the patient was reviewed and confirmed in the electronic medical record.    Review of Systems   All other systems reviewed and are negative.      KPS 80%    Physical Exam   Constitutional: She is oriented to person, place, and time. She appears well-developed and well-nourished. No distress.   HENT:   Head: Normocephalic and atraumatic.   Mouth/Throat: Oropharynx is clear and moist.   Chemo related alopecia, with early hair regrowth   Eyes: Pupils are equal, round, and reactive to light. Conjunctivae and EOM are normal.   Neck: Normal range of motion. Neck supple.   Cardiovascular: Normal rate and regular rhythm. Exam reveals no friction rub.   No murmur heard.  Pulmonary/Chest: Effort normal and  breath sounds normal. She has no wheezes.   Abdominal: Soft. Bowel sounds are normal. She exhibits no distension and no mass. There is no tenderness.   Genitourinary:   Genitourinary Comments: Gyn exam was not performed since she just underwent an exam upstairs in the Gyn Onc clinic   Musculoskeletal: Normal range of motion. She exhibits no edema.   Lymphadenopathy:     She has no cervical adenopathy.   Neurological: She is alert and oriented to person, place, and time.   Skin: Skin is warm and dry.   Psychiatric: She has a normal mood and affect. Her behavior is normal. Judgment and thought content normal.   Nursing note and vitals reviewed.      VITAL SIGNS:   Vitals:    06/03/20 1403   BP: 151/73   Pulse: 76   Resp: 18   Temp: 96.7 °F (35.9 °C)   TempSrc: Temporal   SpO2: 95%   Weight: 50 kg (110 lb 3.2 oz)   PainSc: 0-No pain     LABORATORY  Ca-125 (6/3/2020): 7.0 ng/ml    The following portions of the patient's history were reviewed and updated as appropriate: allergies, current medications, past family history, past medical history, past social history, past surgical history and problem list.         Yin was seen today for uterine cancer.    Diagnoses and all orders for this visit:    Endometrial cancer (CMS/HCC)         IMPRESSION: Mrs. Siddiqui is a 79-year-old female with a FIGO stage Ia carcinosarcoma of the uterus.  She is 4 months out from completion of therapy and clinically appears to be doing very well without any evidence of recurrent disease.  I again reiterated the survivorship model with her including the timeframe for repeat exams.  She is not having any significant radiation related toxicities at this point in time and considering she will be followed very closely in the GYN oncology clinic, she can be seen in my clinic on an as-needed basis.    RECOMMENDATIONS:     Return Follow-up per Gyn Oncology.    Valdez Yun MD

## 2020-06-03 NOTE — PROGRESS NOTES
"GYN ONCOLOGY CANCER SURVIVORSHIP VISIT    Yin Siddiqui  [id number]  1940    Chief Complaint: Follow-up (Survivorship)        History of present illness:  Yin Siddiqui is a 79 y.o. year old female who is here today for her Cancer Survivorship visit, see oncology history below. She is accompanied by her . They are scheduled for follow-up with Dr. Yun later this afternoon. She is feeling very well today. She denies vaginal bleeding, pelvic pain, concerning lesions, and changes in bowel or bladder function. She notes ongoing \"chemo brain\" and mild fatigue, but otherwise feels she has minimal to no residual side effects from chemotherapy. She has been staying safe at home secondary to COVID-19 and understanding that she is in the high risk population. She denies any symptoms of coronavirus or known exposures.        Cancer History:      Uterine Carcinosarcoma     9/18/2019 Initial Diagnosis     Endometrial biopsy by Dr. Lam due to recurrent postmenopausal bleeding. Pathology showed cytologic atypia in background of extensive tumor necrosis.  =36.6. Referred to Gyn Oncology      9/18/2019 Imaging     CT abd/pelvis IMPRESSION:  1. Approximately 7 cm central uterine mass, strongly vascular along its  posterior lateral right margin at approximately 7:00 as noted above. No  evidence of invasion into the surrounding soft tissues.  2. No evidence of adenopathy or other metastatic disease.  3. 2 cm nonenhancing water density right ovarian cyst.  4. Incidentally noted small hepatic cyst and normal variant bilateral  renal parapelvic cysts.  Pre-op CXR ARIS      9/27/2019 Surgery     Exploratory laparotomy, Type 1 radical hysterectomy, BSO, pelvic lymph node dissection, omentectomy, cystoscopy with bilateral temporary ureteral stents.     Final pathology revealed carcinosarcoma (MMMT), multiple foci measuring up to 7.8 cm, filling entire endometrial cavity, with minimal myometrial invasion (up to 1/11 " mm, 9% invasion). 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 Sarcomatous component is undifferentiated. Cytology negative, no LVSI, nodes negative. Stage IA MMMT        11/6/2019 - 2/27/2020 Chemotherapy     OP UTERINE PACLitaxel / CARBOplatin (Q21D)  Toxicities included neutropenia requiring dose reductions (cycle 3&4) and G-CSF, neuropathy, and dehydration.   Cycle #6 delayed due to hospitalization.      11/19/2019 - 12/5/2019 Radiation     Radiation OncologyTreatment Course:  Yin Siddiqui received 3000 cGy in 5 fractions to vagina via High Dose Radiation - HDR, vaginal brachytherapy.       2/19/2020 - 2/23/2020 Other Event     Inpatient hospitalization due to dehydration, hypotension, SOA, N/V, and bloody diarrhea. Patient found to be anemic secondary to duodenal bleeding ulcer. Treated with interventional EGD, blood transfusion, and IV hydration.       3/19/2020 Imaging     Post-treatment CT chest, abdomen, pelvis negative for disease      6/3/2020 Survivorship     Survivorship Care Plan completed and discussed with patient.  Copy of Survivorship Care Plan provided to patient and primary care provider.         Past Medical History:   Diagnosis Date   • Anemia    • Degenerative disc disease, lumbar     hands and knees    • Dyslipidemia    • Endometrial cancer (CMS/HCC) 9/27/2019   • GERD (gastroesophageal reflux disease)    • History of brachytherapy 12/05/2019    vagina   • Hyperlipidemia    • Menopause    • OAB (overactive bladder)    • Osteopenia    • PMB (postmenopausal bleeding)    • Skin cancer    • Urge incontinence    • Vaginal atrophy    • Wears dentures     upper   • Wears reading eyeglasses        Past Surgical History:   Procedure Laterality Date   • CATARACT EXTRACTION W/ INTRAOCULAR LENS  IMPLANT, BILATERAL     • COLONOSCOPY  2019   • CYST REMOVAL      left wrist   • CYSTOSCOPY W/ URETERAL STENT PLACEMENT Bilateral 9/27/2019    Procedure: CYSTOSCOPY WITH BILATERAL TEMPORARY URETERAL  STENT INSERTION;  Surgeon: Dasha Claudio MD;  Location:  [facility];  Service: Gynecology   • D&C HYSTEROSCOPY     • ENDOSCOPY N/A 2/20/2020    Procedure: ESOPHAGOGASTRODUODENOSCOPY;  Surgeon: Brunner, Mark I, MD;  Location:  [facility];  Service: Gastroenterology;  Laterality: N/A;   • EXPLORATORY LAPAROTOMY, TOTAL ABDOMINAL HYSTERECTOMY, SALPINGO OOPHORECTOMY WITH TUMOR DEBULKING Bilateral 9/27/2019    Procedure: EXPLORATORY LAPAROTOMY TYPE 1, RADICAL TOTAL ABDOMINAL HYSTERECTOMY, BILATERAL SALPINGO OOPHORECTOMY, PELVIC LYMPH NODE DISSECTION, OMENTUMECTOMY;  Surgeon: Dasha Claudio MD;  Location:  [facility];  Service: Gynecology   • EYE SURGERY Left 09/05/2019    macular hole    • EYE SURGERY      as a child for muscular problems  x 3   • HAND SURGERY Right     bone removed    • TONSILLECTOMY         MEDICATIONS: The current medication list was reviewed and reconciled.     Allergies:  has No Known Allergies.    Family History   Problem Relation Age of Onset   • Breast cancer Mother 78   • Ovarian cancer Neg Hx        Last imaging study was negative post-treatment CT 3/19/2020.   Tumor marker:     Date Value Ref Range Status   02/26/2020 7.0 0.0 - 38.1 U/mL Final   01/29/2020 8.1 0.0 - 38.1 U/mL Final   01/08/2020 8.9 0.0 - 38.1 U/mL Final   12/18/2019 9.6 0.0 - 38.1 U/mL Final       Review of Systems   Constitutional: Positive for fatigue. Negative for appetite change, chills, fever and unexpected weight change.   Respiratory: Negative for cough, shortness of breath and wheezing.    Cardiovascular: Negative for chest pain, palpitations and leg swelling.   Gastrointestinal: Negative for abdominal distention, abdominal pain, blood in stool, constipation, diarrhea, nausea and vomiting.   Endocrine: Negative.    Genitourinary: Negative for dyspareunia, dysuria, frequency, genital sores, hematuria, pelvic pain, urgency, vaginal bleeding, vaginal discharge and vaginal pain.   Musculoskeletal: Negative  for arthralgias, gait problem and joint swelling.   Neurological: Negative for dizziness, seizures, syncope, weakness, light-headedness, numbness and headaches.   Hematological: Negative for adenopathy.   Psychiatric/Behavioral: Positive for decreased concentration (memory changes).         Physical Exam  Vital Signs: /73   Pulse 73   Temp 96.7 °F (35.9 °C) (Temporal)   Resp 12   Wt 50.3 kg (111 lb)   BMI 19.66 kg/m²   Vitals:    06/03/20 1306   PainSc: 0-No pain           General Appearance:  alert, cooperative, no apparent distress and appears stated age, thin   Neurologic/Psychiatric: A&O x 3, gait steady, appropriate affect   HEENT:  Normocephalic, without obvious abnormality, mucous membranes moist   Neck: Supple, symmetrical, trachea midline, no adenopathy;  No thyromegaly, masses, or tenderness   Back:   Symmetric, no curvature, ROM normal, no CVA tenderness   Lungs:   Clear to auscultation bilaterally; respirations regular, even, and unlabored bilaterally   Heart:  Regular rate and rhythm, no murmurs appreciated   Breasts:  deferred   Abdomen:   Soft, non-tender, non-distended and no organomegaly   Lymph nodes: No cervical, supraclavicular, inguinal or axillary adenopathy noted   Extremities: Normal, atraumatic; no clubbing, cyanosis, or edema    Pelvic: External Genitalia  atrophic, without lesions  Vagina  is pale, atrophic.  and narrow introitus  Vaginal Cuff  Female Vaginal Cuff: smooth, intact, without visible lesions and changes consistent with previous radiation  Uterus  surgically absent and no palpable masses  Ovaries  surgically absent bilaterally  Parametria  smooth  Rectovaginal  Female rectovaginal: deferred     ECOG Performance Status: (0) Fully Active - Able to Carry On All Pre-disease Performance Without Restriction      Survivorship Needs Assessment:  PT/Rehab  Health history, treatment course, and current status. The patient is not in need of physical therapy or rehabilitation  services.    Behavioral Health  A post-treatment depression screening has been completed. PHQ-9 results show 0 (No Depression). The patient has not previously established mental health care. A referral is not recommended at this time. The patient is not in need of [facility]ERTO Cervantes.      Procedure Note:  No notes on file      Assessment and Plan:  Yin was seen today for follow-up.    Diagnoses and all orders for this visit:    Endometrial cancer (CMS/HCC)  -     CT Abdomen Pelvis With Contrast; Future  -     CT Chest With Contrast; Future        There is no evidence of disease upon today's exam.   Most recent NCCN guidelines reviewed. For patients with uterine sarcomas, CT chest/abdomen/pelvis is recommended for surveillance every 3-6 months for the first 3 years, then every 6-12 months for the next 2 years. Annual to bi-annual imaging can be considered thereafter up to an additional 5 years depending on histology and initial staging.   Due to early staging, we will plan for every 6 month CT scans, therefore, due next in 9/2020. CTs ordered.   Discussed plan for every 3 month cancer surveillance visits, can be alternated between gyn oncology and radiation oncology.   Patient scheduled for well woman annual exam with Dr. Lam next month.   She is understanding to call with any changes in pelvic symptoms or general GYN concerns at any time between regularly scheduled visits.     The patient and I have reviewed her Survivorship Care Plan in detail. We discussed her diagnosis, pathology, histology, all treatments, and ongoing surveillance recommendations. All questions were answered to her satisfaction. She is in agreement with our plan for ongoing surveillance as outlined in her plan. A copy of this document was provided to her at the completion of our visit.  A copy has also been sent to her primary care provider and general gynecologist.    Pain assessment was performed today as a part of patient’s care. For  patients with pain related to surgery, gynecologic malignancy or cancer treatment, the plan is as noted in the assessment/plan.  For patients with pain not related to these issues, they are to seek any further needed care from a more appropriate provider, such as PCP.      This was a 40 minute direct face to face visit with 25 minutes spent in review of her Survivorship Care Plan.    Return to clinic in 3 months for ongoing cancer surveillance and review of CT scan.      Petra Avalos APRN

## 2020-07-07 ENCOUNTER — HOSPITAL ENCOUNTER (OUTPATIENT)
Dept: MAMMOGRAPHY | Facility: HOSPITAL | Age: 80
Discharge: HOME | End: 2020-07-07
Admitting: OBSTETRICS & GYNECOLOGY

## 2020-07-07 DIAGNOSIS — Z12.31 VISIT FOR SCREENING MAMMOGRAM: ICD-10-CM

## 2020-07-07 PROCEDURE — 77067 SCR MAMMO BI INCL CAD: CPT | Performed by: RADIOLOGY

## 2020-07-07 PROCEDURE — 77063 BREAST TOMOSYNTHESIS BI: CPT

## 2020-07-07 PROCEDURE — 77067 SCR MAMMO BI INCL CAD: CPT

## 2020-07-07 PROCEDURE — 77063 BREAST TOMOSYNTHESIS BI: CPT | Performed by: RADIOLOGY

## 2020-09-02 ENCOUNTER — HOSPITAL ENCOUNTER (OUTPATIENT)
Dept: CT IMAGING | Facility: HOSPITAL | Age: 80
Discharge: HOME | End: 2020-09-02
Admitting: NURSE PRACTITIONER

## 2020-09-02 DIAGNOSIS — C54.1 ENDOMETRIAL CANCER (HCC): ICD-10-CM

## 2020-09-02 LAB — CREAT BLDA-MCNC: 1 MG/DL (ref 0.6–1.3)

## 2020-09-02 PROCEDURE — 25010000002 IOPAMIDOL 61 % SOLUTION: Performed by: NURSE PRACTITIONER

## 2020-09-02 PROCEDURE — 82565 ASSAY OF CREATININE: CPT

## 2020-09-02 PROCEDURE — 71260 CT THORAX DX C+: CPT

## 2020-09-02 PROCEDURE — 74177 CT ABD & PELVIS W/CONTRAST: CPT

## 2020-09-02 RX ADMIN — IOPAMIDOL 75 ML: 612 INJECTION, SOLUTION INTRAVENOUS at 11:12

## 2020-09-03 ENCOUNTER — TELEPHONE (OUTPATIENT)
Dept: GYNECOLOGIC ONCOLOGY | Facility: CLINIC | Age: 80
End: 2020-09-03

## 2020-09-03 NOTE — TELEPHONE ENCOUNTER
----- Message from HERIBERTO Carr sent at 9/3/2020  8:17 AM EDT -----  CT for uterine sarcoma surveillance. Please notify patient no evidence of disease. Thanks!    09/03/2020:  I called pt to inform that CT scan showed ARIS.  No answer on her home phone, cell phone or her 's cell phone.  Will try to reach her again later.     I called pt again.  Informed her of negative CT results.  She voiced understanding.

## 2020-09-08 ENCOUNTER — OFFICE VISIT (OUTPATIENT)
Dept: GYNECOLOGIC ONCOLOGY | Facility: CLINIC | Age: 80
End: 2020-09-08

## 2020-09-08 ENCOUNTER — LAB (OUTPATIENT)
Dept: LAB | Facility: HOSPITAL | Age: 80
End: 2020-09-08

## 2020-09-08 VITALS
HEIGHT: 63 IN | TEMPERATURE: 98 F | WEIGHT: 114 LBS | RESPIRATION RATE: 16 BRPM | SYSTOLIC BLOOD PRESSURE: 132 MMHG | BODY MASS INDEX: 20.2 KG/M2 | HEART RATE: 74 BPM | DIASTOLIC BLOOD PRESSURE: 70 MMHG | OXYGEN SATURATION: 96 %

## 2020-09-08 DIAGNOSIS — C54.1 ENDOMETRIAL CANCER (HCC): ICD-10-CM

## 2020-09-08 DIAGNOSIS — Z01.419 WELL WOMAN EXAM WITH ROUTINE GYNECOLOGICAL EXAM: Primary | ICD-10-CM

## 2020-09-08 LAB — CANCER AG125 SERPL QL: 7 U/ML (ref 0–38.1)

## 2020-09-08 PROCEDURE — 36415 COLL VENOUS BLD VENIPUNCTURE: CPT

## 2020-09-08 PROCEDURE — 99397 PER PM REEVAL EST PAT 65+ YR: CPT | Performed by: NURSE PRACTITIONER

## 2020-09-08 PROCEDURE — 86304 IMMUNOASSAY TUMOR CA 125: CPT

## 2020-09-08 NOTE — PROGRESS NOTES
GYN ONCOLOGY ANNUAL WELL WOMAN VISIT      Yin Siddiqui  [id number]  1940      Chief Complaint: Annual Exam (no gyn complaints)        History of present illness:  Yin Siddiqui is a 79 y.o. year old female who is here today for an annual exam and ongoing surveillance of uterine carcinosarcoma, see history below.  She desires annual exam today as she canceled her scheduled appointment with her general gynecologist.  However, she declines clinical breast exam due to having a negative mammogram in July 2020.  Patient is feeling generally well today and has no GYN complaints.  She underwent surveillance CT imaging on 9/2/2020, negative for recurrent disease.  She denies vaginal bleeding, pelvic pain, concerning lesions, breast problems, or changes in bowel or bladder function.  She notes occasional insomnia, using Ativan 0.5 mg as needed plus melatonin seems to help.  Well woman screenings are generally up-to-date.    Cancer History:      Uterine Carcinosarcoma     9/18/2019 Initial Diagnosis     Endometrial biopsy by Dr. Lam due to recurrent postmenopausal bleeding. Pathology showed cytologic atypia in background of extensive tumor necrosis.  =36.6. Referred to Gyn Oncology      9/18/2019 Imaging     CT abd/pelvis IMPRESSION:  1. Approximately 7 cm central uterine mass, strongly vascular along its  posterior lateral right margin at approximately 7:00 as noted above. No  evidence of invasion into the surrounding soft tissues.  2. No evidence of adenopathy or other metastatic disease.  3. 2 cm nonenhancing water density right ovarian cyst.  4. Incidentally noted small hepatic cyst and normal variant bilateral  renal parapelvic cysts.  Pre-op CXR ARIS      9/27/2019 Surgery     Exploratory laparotomy, Type 1 radical hysterectomy, BSO, pelvic lymph node dissection, omentectomy, cystoscopy with bilateral temporary ureteral stents.     Final pathology revealed carcinosarcoma (MMMT), multiple foci measuring up  to 7.8 cm, filling entire endometrial cavity, with minimal myometrial invasion (up to 1/11 mm, 9% invasion). 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 Sarcomatous component is undifferentiated. Cytology negative, no LVSI, nodes negative. Stage IA MMMT        2019 - 2020 Chemotherapy     OP UTERINE PACLitaxel / CARBOplatin (Q21D)  Toxicities included neutropenia requiring dose reductions (cycle 3&4) and G-CSF, neuropathy, and dehydration.   Cycle #6 delayed due to hospitalization.      2019 - 2019 Radiation     Radiation OncologyTreatment Course:  Yin Siddiqui received 3000 cGy in 5 fractions to vagina via High Dose Radiation - HDR, vaginal brachytherapy.       2020 - 2020 Other Event     Inpatient hospitalization due to dehydration, hypotension, SOA, N/V, and bloody diarrhea. Patient found to be anemic secondary to duodenal bleeding ulcer. Treated with interventional EGD, blood transfusion, and IV hydration.       3/19/2020 Imaging     Post-treatment CT chest, abdomen, pelvis negative for disease      6/3/2020 Survivorship     Survivorship Care Plan completed and discussed with patient.  Copy of Survivorship Care Plan provided to patient and primary care provider.      2020 Imaging     CT shows stable appearance of the chest, abdomen and pelvis with hepatic steatosis and hepatic cysts again noted as well as degenerative  changes of the spine without evidence for local pelvic recurrence or metastatic involvement         Obstetric History:  OB History        2    Para   2    Term   2            AB        Living   2       SAB        TAB        Ectopic        Molar        Multiple        Live Births                   Menstrual History:     No LMP recorded. Patient has had a hysterectomy.          Past Medical History:   Diagnosis Date   • Anemia    • Degenerative disc disease, lumbar     hands and knees    • Drug therapy     endometrial cancer   •  Dyslipidemia    • Endometrial cancer (CMS/HCC) 9/27/2019   • GERD (gastroesophageal reflux disease)    • History of brachytherapy 12/05/2019    vagina   • Hx of radiation therapy     endometrial cancer   • Hyperlipidemia    • Menopause    • OAB (overactive bladder)    • Osteopenia    • PMB (postmenopausal bleeding)    • Skin cancer    • Urge incontinence    • Vaginal atrophy    • Wears dentures     upper   • Wears reading eyeglasses        Past Surgical History:   Procedure Laterality Date   • CATARACT EXTRACTION W/ INTRAOCULAR LENS  IMPLANT, BILATERAL     • COLONOSCOPY  2019   • CYST REMOVAL      left wrist   • CYSTOSCOPY W/ URETERAL STENT PLACEMENT Bilateral 9/27/2019    Procedure: CYSTOSCOPY WITH BILATERAL TEMPORARY URETERAL STENT INSERTION;  Surgeon: Dasha Claudio MD;  Location:  [facility];  Service: Gynecology   • D&C HYSTEROSCOPY     • ENDOSCOPY N/A 2/20/2020    Procedure: ESOPHAGOGASTRODUODENOSCOPY;  Surgeon: Brunner, Mark I, MD;  Location:  [facility];  Service: Gastroenterology;  Laterality: N/A;   • EXPLORATORY LAPAROTOMY, TOTAL ABDOMINAL HYSTERECTOMY, SALPINGO OOPHORECTOMY WITH TUMOR DEBULKING Bilateral 9/27/2019    Procedure: EXPLORATORY LAPAROTOMY TYPE 1, RADICAL TOTAL ABDOMINAL HYSTERECTOMY, BILATERAL SALPINGO OOPHORECTOMY, PELVIC LYMPH NODE DISSECTION, OMENTUMECTOMY;  Surgeon: Dasha Claudio MD;  Location:  [facility];  Service: Gynecology   • EYE SURGERY Left 09/05/2019    macular hole    • EYE SURGERY      as a child for muscular problems  x 3   • HAND SURGERY Right     bone removed    • OOPHORECTOMY     • TONSILLECTOMY         MEDICATIONS: The current medication list was reviewed and reconciled.     Allergies:  has No Known Allergies.    Family History   Problem Relation Age of Onset   • Breast cancer Mother 78   • Ovarian cancer Neg Hx        Health Maintenance:  Last mammogram was 7/2020. Last colonoscopy was 4/2019, with recommended follow-up in 5 year(s). Last DEXA was  "5/23/2017.    Last imaging study was CT chest, abdomen, pelvis 9/2/2020.   Tumor marker:      Date Value Ref Range Status   09/08/2020 7.0 0.0 - 38.1 U/mL Final   02/26/2020 7.0 0.0 - 38.1 U/mL Final   01/29/2020 8.1 0.0 - 38.1 U/mL Final   01/08/2020 8.9 0.0 - 38.1 U/mL Final       Review of Systems   Constitutional: Positive for fatigue. Negative for fever and unexpected weight change.   HENT: Negative for congestion, ear pain, hearing loss, sinus pressure and trouble swallowing.    Eyes: Negative for visual disturbance.   Respiratory: Negative for cough, chest tightness, shortness of breath and wheezing.    Cardiovascular: Negative for chest pain, palpitations and leg swelling.   Gastrointestinal: Positive for constipation (occ). Negative for abdominal distention, abdominal pain, diarrhea, nausea and vomiting.   Endocrine: Negative for cold intolerance, heat intolerance, polydipsia, polyphagia and polyuria.   Genitourinary: Negative for difficulty urinating, dysuria, frequency, hematuria, pelvic pain, urgency, vaginal bleeding, vaginal discharge and vaginal pain.   Musculoskeletal: Positive for arthralgias. Negative for gait problem, joint swelling and myalgias.   Skin: Negative for color change, pallor and rash.   Neurological: Negative for dizziness, seizures, syncope, weakness, light-headedness, numbness and headaches.   Hematological: Negative for adenopathy. Does not bruise/bleed easily.   Psychiatric/Behavioral: Positive for confusion and sleep disturbance (using ativan + melatonin). Negative for agitation and suicidal ideas. The patient is not nervous/anxious.          Physical Exam  Vital Signs: /70   Pulse 74   Temp 98 °F (36.7 °C) (Temporal)   Resp 16   Ht 160 cm (62.99\")   Wt 51.7 kg (114 lb)   SpO2 96%   BMI 20.20 kg/m²   Vitals:    09/08/20 1402   PainSc: 0-No pain           General Appearance:  alert, cooperative, no apparent distress, appears stated age and normal weight "   Neurologic/Psychiatric: A&O x 3, gait steady, appropriate affect   HEENT:  Normocephalic, without obvious abnormality, mucous membranes moist   Neck: Supple, symmetrical, trachea midline, no adenopathy;  No thyromegaly, masses, or tenderness   Back:   Symmetric, no curvature, ROM normal, no CVA tenderness   Lungs:   Clear to auscultation bilaterally; respirations regular, even, and unlabored bilaterally   Heart:  Regular rate and rhythm, no murmurs appreciated   Breasts:  declined   Abdomen:   Soft, non-tender, non-distended and no organomegaly   Lymph nodes: No cervical, supraclavicular, inguinal or axillary adenopathy noted   Extremities: Normal, atraumatic; no clubbing, cyanosis, or edema    Skin: No rashes, ulcers, or suspicious lesions noted   Pelvic: External Genitalia  atrophic, without lesions  Vagina  is pale, atrophic.  and narrow introitus  Vaginal Cuff  Female Vaginal Cuff: smooth, intact, without visible lesions and changes consistent with previous radiation  Uterus  surgically absent and no palpable masses  Ovaries  surgically absent bilaterally  Parametria  smooth  Rectovaginal  Female rectovaginal: confirms no masses or bleeding and Hemoccult negative     ECOG Performance Status: (0) Fully Active - Able to Carry On All Pre-disease Performance Without Restriction    Procedure Note:  No notes on file    Assessment and Plan:    Yin was seen today for annual exam.    Diagnoses and all orders for this visit:    Well woman exam with routine gynecological exam    Endometrial cancer (CMS/HCC)  -     ; Future        There is no evidence of disease upon today's exam. She will be notified of CA-125 results upon their return.   Most recent NCCN guidelines reviewed. For patients with uterine sarcomas, CT chest/abdomen/pelvis is recommended for surveillance every 3-6 months for the first 3 years, then every 6-12 months for the next 2 years. Annual to bi-annual imaging can be considered thereafter up to  an additional 5 years depending on histology and initial staging.   Due to early staging, we will plan for every 6 month CT scans, therefore, due next in 3/2021. CTs will be ordered next visit.   Continue every 3 month cancer surveillance visits until the 2 year makr.   She is understanding to call with any changes in pelvic symptoms or general GYN concerns at any time between regularly scheduled visits.     No pap smear needed today.     She was encouraged to get yearly mammograms.  She should report any palpable breast lump(s) or skin changes regardless of mammographic findings.  I explained to Yin that notification regarding her mammogram results will come from the center performing the study.  Our office will not be routinely calling with mammogram results.  It is her responsibility to make sure that the results from the mammogram are communicated to her by the breast center.  If she has any questions about the results, she is welcome to call our office anytime.    Repeat colonoscopy in 2024 or sooner if new problems.     Repeat DEXA needed soon.    Continue current home regimen for occ insomnia, call if not effective or needing other suggestions.     Pain assessment was performed today as a part of patient’s care. For patients with pain related to surgery, gynecologic malignancy or cancer treatment, the plan is as noted in the assessment/plan.  For patients with pain not related to these issues, they are to seek any further needed care from a more appropriate provider, such as PCP.      Return to clinic in 3 months for ongoing cancer surveillance.      Electronically signed by HERIBERTO Carr on 09/10/20 at 10:46

## 2020-09-10 ENCOUNTER — TELEPHONE (OUTPATIENT)
Dept: GYNECOLOGIC ONCOLOGY | Facility: CLINIC | Age: 80
End: 2020-09-10

## 2020-09-10 PROBLEM — D62 ACUTE BLOOD LOSS ANEMIA: Status: RESOLVED | Noted: 2020-02-20 | Resolved: 2020-09-10

## 2020-09-10 PROBLEM — D70.1 CHEMOTHERAPY-INDUCED NEUTROPENIA (HCC): Status: RESOLVED | Noted: 2019-12-09 | Resolved: 2020-09-10

## 2020-09-10 PROBLEM — T45.1X5A CHEMOTHERAPY-INDUCED NEUTROPENIA (HCC): Status: RESOLVED | Noted: 2019-12-09 | Resolved: 2020-09-10

## 2020-09-10 PROBLEM — Z09 HOSPITAL DISCHARGE FOLLOW-UP: Status: RESOLVED | Noted: 2020-02-26 | Resolved: 2020-09-10

## 2020-09-10 PROBLEM — Z91.89 AT RISK FOR INFECTION: Status: RESOLVED | Noted: 2019-12-09 | Resolved: 2020-09-10

## 2020-09-10 PROBLEM — R51.9 SCALP PAIN: Status: RESOLVED | Noted: 2019-11-27 | Resolved: 2020-09-10

## 2020-09-10 PROBLEM — E86.0 DEHYDRATION: Status: RESOLVED | Noted: 2019-12-03 | Resolved: 2020-09-10

## 2020-09-10 NOTE — TELEPHONE ENCOUNTER
----- Message from HERIBERTO Carr sent at 9/9/2020  8:20 AM EDT -----  Please notify CA-125 low! Thanks!

## 2020-12-09 ENCOUNTER — TELEPHONE (OUTPATIENT)
Dept: GYNECOLOGIC ONCOLOGY | Facility: CLINIC | Age: 80
End: 2020-12-09

## 2020-12-09 ENCOUNTER — LAB (OUTPATIENT)
Dept: LAB | Facility: HOSPITAL | Age: 80
End: 2020-12-09

## 2020-12-09 ENCOUNTER — OFFICE VISIT (OUTPATIENT)
Dept: GYNECOLOGIC ONCOLOGY | Facility: CLINIC | Age: 80
End: 2020-12-09

## 2020-12-09 VITALS
TEMPERATURE: 97.7 F | SYSTOLIC BLOOD PRESSURE: 144 MMHG | DIASTOLIC BLOOD PRESSURE: 66 MMHG | HEIGHT: 63 IN | HEART RATE: 62 BPM | WEIGHT: 119.9 LBS | OXYGEN SATURATION: 97 % | BODY MASS INDEX: 21.25 KG/M2 | RESPIRATION RATE: 17 BRPM

## 2020-12-09 DIAGNOSIS — R09.81 CHRONIC NASAL CONGESTION: ICD-10-CM

## 2020-12-09 DIAGNOSIS — C54.1 ENDOMETRIAL CANCER (HCC): Primary | ICD-10-CM

## 2020-12-09 LAB — CANCER AG125 SERPL QL: 7 U/ML (ref 0–38.1)

## 2020-12-09 PROCEDURE — 36415 COLL VENOUS BLD VENIPUNCTURE: CPT | Performed by: NURSE PRACTITIONER

## 2020-12-09 PROCEDURE — 99214 OFFICE O/P EST MOD 30 MIN: CPT | Performed by: NURSE PRACTITIONER

## 2020-12-09 PROCEDURE — 86304 IMMUNOASSAY TUMOR CA 125: CPT | Performed by: NURSE PRACTITIONER

## 2020-12-09 RX ORDER — CETIRIZINE HYDROCHLORIDE, PSEUDOEPHEDRINE HYDROCHLORIDE 5; 120 MG/1; MG/1
1 TABLET, FILM COATED, EXTENDED RELEASE ORAL EVERY MORNING
Qty: 30 TABLET | Refills: 2 | Status: SHIPPED | OUTPATIENT
Start: 2020-12-09 | End: 2021-09-29

## 2020-12-09 NOTE — PROGRESS NOTES
GYN ONCOLOGY CANCER SURVEILLANCE      Yin Siddiqui  [id number]  1940      Chief Complaint: Follow-up (c/o persistent nasal congestion)        History of present illness:  Yin Siddiqui is a 80 y.o. year old female who is here today for ongoing surveillance of uterine carcinosarcoma, see history below.  She is accompanied by her .   Patient is feeling generally well today and has no GYN complaints.  She underwent surveillance CT imaging on 9/2/2020, negative for recurrent disease.  Plan is for every 6 month CT imaging due to uterine carcinosarcoma. She denies vaginal bleeding, pelvic pain, concerning lesions, or changes in bowel or bladder function.  Upon arrival she c/o persistent clear nasal congestion and drainage since treatment. She has tried multiple OTC antihistamines without benefit. She is currently taking Zyrtec daily and Flonase PRN without relief. She inquires about other options.       Cancer History:   Oncology/Hematology History   Uterine Carcinosarcoma    9/18/2019 Initial Diagnosis    Endometrial biopsy by Dr. Lam due to recurrent postmenopausal bleeding. Pathology showed cytologic atypia in background of extensive tumor necrosis.  =36.6. Referred to Gyn Oncology     9/18/2019 Imaging    CT abd/pelvis IMPRESSION:  1. Approximately 7 cm central uterine mass, strongly vascular along its  posterior lateral right margin at approximately 7:00 as noted above. No  evidence of invasion into the surrounding soft tissues.  2. No evidence of adenopathy or other metastatic disease.  3. 2 cm nonenhancing water density right ovarian cyst.  4. Incidentally noted small hepatic cyst and normal variant bilateral  renal parapelvic cysts.  Pre-op CXR ARIS     9/27/2019 Surgery    Exploratory laparotomy, Type 1 radical hysterectomy, BSO, pelvic lymph node dissection, omentectomy, cystoscopy with bilateral temporary ureteral stents.     Final pathology revealed carcinosarcoma (MMMT), multiple foci  measuring up to 7.8 cm, filling entire endometrial cavity, with minimal myometrial invasion (up to 1/11 mm, 9% invasion). 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 Sarcomatous component is undifferentiated. Cytology negative, no LVSI, nodes negative. Stage IA MMMT       2019 - 2020 Chemotherapy    OP UTERINE PACLitaxel / CARBOplatin (Q21D)  Toxicities included neutropenia requiring dose reductions (cycle 3&4) and G-CSF, neuropathy, and dehydration.   Cycle #6 delayed due to hospitalization.     2019 - 2019 Radiation    Radiation OncologyTreatment Course:  Yin Siddiqui received 3000 cGy in 5 fractions to vagina via High Dose Radiation - HDR, vaginal brachytherapy.      2020 - 2020 Other Event    Inpatient hospitalization due to dehydration, hypotension, SOA, N/V, and bloody diarrhea. Patient found to be anemic secondary to duodenal bleeding ulcer. Treated with interventional EGD, blood transfusion, and IV hydration.      3/19/2020 Imaging    Post-treatment CT chest, abdomen, pelvis negative for disease     6/3/2020 Survivorship    Survivorship Care Plan completed and discussed with patient.  Copy of Survivorship Care Plan provided to patient and primary care provider.     2020 Imaging    CT shows stable appearance of the chest, abdomen and pelvis with hepatic steatosis and hepatic cysts again noted as well as degenerative  changes of the spine without evidence for local pelvic recurrence or metastatic involvement         Obstetric History:  OB History        2    Para   2    Term   2            AB        Living   2       SAB        TAB        Ectopic        Molar        Multiple        Live Births                   Menstrual History:     No LMP recorded. Patient has had a hysterectomy.          Past Medical History:   Diagnosis Date   • Anemia    • Degenerative disc disease, lumbar     hands and knees    • Drug therapy     endometrial cancer   •  Dyslipidemia    • Endometrial cancer (CMS/HCC) 9/27/2019   • GERD (gastroesophageal reflux disease)    • History of brachytherapy 12/05/2019    vagina   • Hx of radiation therapy     endometrial cancer   • Hyperlipidemia    • Menopause    • OAB (overactive bladder)    • Osteopenia    • PMB (postmenopausal bleeding)    • Skin cancer    • Urge incontinence    • Vaginal atrophy    • Wears dentures     upper   • Wears reading eyeglasses        Past Surgical History:   Procedure Laterality Date   • CATARACT EXTRACTION W/ INTRAOCULAR LENS  IMPLANT, BILATERAL     • COLONOSCOPY  2019   • CYST REMOVAL      left wrist   • CYSTOSCOPY W/ URETERAL STENT PLACEMENT Bilateral 9/27/2019    Procedure: CYSTOSCOPY WITH BILATERAL TEMPORARY URETERAL STENT INSERTION;  Surgeon: Dasha Claudio MD;  Location:  [facility];  Service: Gynecology   • D&C HYSTEROSCOPY     • ENDOSCOPY N/A 2/20/2020    Procedure: ESOPHAGOGASTRODUODENOSCOPY;  Surgeon: Brunner, Mark I, MD;  Location:  [facility];  Service: Gastroenterology;  Laterality: N/A;   • EXPLORATORY LAPAROTOMY, TOTAL ABDOMINAL HYSTERECTOMY, SALPINGO OOPHORECTOMY WITH TUMOR DEBULKING Bilateral 9/27/2019    Procedure: EXPLORATORY LAPAROTOMY TYPE 1, RADICAL TOTAL ABDOMINAL HYSTERECTOMY, BILATERAL SALPINGO OOPHORECTOMY, PELVIC LYMPH NODE DISSECTION, OMENTUMECTOMY;  Surgeon: Dasha Claudio MD;  Location:  [facility];  Service: Gynecology   • EYE SURGERY Left 09/05/2019    macular hole    • EYE SURGERY      as a child for muscular problems  x 3   • HAND SURGERY Right     bone removed    • OOPHORECTOMY     • TONSILLECTOMY         MEDICATIONS: The current medication list was reviewed and reconciled.     Allergies:  has No Known Allergies.    Family History   Problem Relation Age of Onset   • Breast cancer Mother 78   • Ovarian cancer Neg Hx        Last imaging study was CT chest, abdomen, pelvis 9/2/2020.   Tumor marker:      Date Value Ref Range Status   09/08/2020 7.0 0.0 - 38.1 U/mL  "Final   02/26/2020 7.0 0.0 - 38.1 U/mL Final   01/29/2020 8.1 0.0 - 38.1 U/mL Final   01/08/2020 8.9 0.0 - 38.1 U/mL Final       Review of Systems   Constitutional: Positive for fatigue. Negative for fever and unexpected weight change.   HENT: Positive for congestion and postnasal drip. Negative for ear pain, hearing loss, sinus pressure and trouble swallowing.    Eyes: Negative for visual disturbance.   Respiratory: Negative for cough, chest tightness, shortness of breath and wheezing.    Cardiovascular: Negative for chest pain, palpitations and leg swelling.   Gastrointestinal: Positive for constipation (occ). Negative for abdominal distention, abdominal pain, diarrhea, nausea and vomiting.   Endocrine: Negative for cold intolerance, heat intolerance, polydipsia, polyphagia and polyuria.   Genitourinary: Negative for difficulty urinating, dysuria, frequency, hematuria, pelvic pain, urgency, vaginal bleeding, vaginal discharge and vaginal pain.   Musculoskeletal: Positive for arthralgias. Negative for gait problem, joint swelling and myalgias.   Skin: Negative for color change, pallor and rash.   Neurological: Negative for dizziness, seizures, syncope, weakness, light-headedness, numbness and headaches.   Hematological: Negative for adenopathy. Does not bruise/bleed easily.   Psychiatric/Behavioral: Positive for confusion and sleep disturbance (using ativan + melatonin). Negative for agitation and suicidal ideas. The patient is not nervous/anxious.          Physical Exam  Vital Signs: /66   Pulse 62   Temp 97.7 °F (36.5 °C) (Temporal)   Resp 17   Ht 160 cm (62.99\")   Wt 54.4 kg (119 lb 14.4 oz)   SpO2 97%   BMI 21.24 kg/m²   Vitals:    12/09/20 1001   PainSc: 0-No pain           General Appearance:  alert, cooperative, no apparent distress, appears stated age and normal weight   Neurologic/Psychiatric: A&O x 3, gait steady, appropriate affect   HEENT:  Normocephalic, without obvious abnormality, mucous " membranes moist   Neck: Supple, symmetrical, trachea midline, no adenopathy;  No thyromegaly, masses, or tenderness   Back:   Symmetric, no curvature, ROM normal, no CVA tenderness   Lungs:   Clear to auscultation bilaterally; respirations regular, even, and unlabored bilaterally   Heart:  Regular rate and rhythm, no murmurs appreciated   Breasts:  declined   Abdomen:   Soft, non-tender, non-distended and no organomegaly   Lymph nodes: No cervical, supraclavicular, inguinal or axillary adenopathy noted   Extremities: Normal, atraumatic; no clubbing, cyanosis, or edema    Skin: No rashes, ulcers, or suspicious lesions noted   Pelvic: External Genitalia  atrophic, without lesions  Vagina  is pale, atrophic.  and narrow introitus  Vaginal Cuff  Female Vaginal Cuff: smooth, intact, without visible lesions and changes consistent with previous radiation  Uterus  surgically absent and no palpable masses  Ovaries  surgically absent bilaterally  Parametria  smooth  Rectovaginal  Female rectovaginal: confirms no masses or bleeding and Hemoccult negative     ECOG Performance Status: (0) Fully Active - Able to Carry On All Pre-disease Performance Without Restriction    Procedure Note:  No notes on file    Assessment and Plan:    Diagnoses and all orders for this visit:    1. Uterine Carcinosarcoma  (Primary)  -       -     CT Abdomen Pelvis With Contrast; Future  -     CT Chest With Contrast; Future    2. Chronic nasal congestion  -     cetirizine-pseudoephedrine (ZyrTEC-D) 5-120 MG per 12 hr tablet; Take 1 tablet by mouth Every Morning.  Dispense: 30 tablet; Refill: 2        There is no evidence of disease upon today's exam. She will be notified of CA-125 results upon their return.   For patients with uterine sarcomas, CT chest/abdomen/pelvis is recommended for surveillance every 3-6 months for the first 3 years, then every 6-12 months for the next 2 years. Annual to bi-annual imaging can be considered thereafter up to  an additional 5 years depending on histology and initial staging. Due to early staging, we will plan for every 6 month CT scans, therefore, due next in 3/2021. CTs ordered today to be done in advance of next visit.  Continue every 3 month cancer surveillance visits until the 2 year fortino.   She is understanding to call with any changes in pelvic symptoms or general GYN concerns at any time between regularly scheduled visits.     Patient, , and I discussed her persistent nasal congestion since treatment. I suggested adding a daily decongestant for a time to see if symptoms will improve. Rx for Zyrtec-D sent to local pharmacy. If this is not effective in the next 4-8 weeks, she may call and we will consider addition of prescription nasal spray or referral to ENT. They v/u.       Pain assessment was performed today as a part of patient’s care. For patients with pain related to surgery, gynecologic malignancy or cancer treatment, the plan is as noted in the assessment/plan.  For patients with pain not related to these issues, they are to seek any further needed care from a more appropriate provider, such as PCP.      Return to clinic in 3 months for ongoing cancer surveillance and review of CT scans.      Electronically signed by HERIBERTO Carr on 12/09/20 at 10:42 EST

## 2020-12-09 NOTE — TELEPHONE ENCOUNTER
I called and left a voicemail on patients phone that ca125 is 7.0 and doing very well. She may call back if she has any questions.

## 2020-12-09 NOTE — TELEPHONE ENCOUNTER
----- Message from HERIBERTO Carr sent at 12/9/2020  3:36 PM EST -----  Please notify CA-125 low/stable. Thanks!

## 2021-01-17 PROCEDURE — U0004 COV-19 TEST NON-CDC HGH THRU: HCPCS | Performed by: FAMILY MEDICINE

## 2021-01-18 ENCOUNTER — TELEPHONE (OUTPATIENT)
Dept: URGENT CARE | Facility: CLINIC | Age: 81
End: 2021-01-18

## 2021-02-02 ENCOUNTER — TELEPHONE (OUTPATIENT)
Dept: GYNECOLOGIC ONCOLOGY | Facility: CLINIC | Age: 81
End: 2021-02-02

## 2021-02-02 NOTE — TELEPHONE ENCOUNTER
I called patient and went over recommendations from [facility] visit. CT every 3-6 months for the first 3 years. There has been an order placed that is expected 03/14/2021.  Patient has an appointment with suzanne on 03/10/2021. She would like to have her CT scan done at [facility], I told her I would get with referral coordinator to get her scheduled there and she will give her a call since she doesn't want to do this one at [facility].  She can get it done early meaning this month or early next month depending on scheduling availabilities.

## 2021-02-02 NOTE — TELEPHONE ENCOUNTER
Caller: PT    Relationship to patient: PT    Best call back number:[phone]    PT LOOKING FOR CLARIFICATION REGARDING YEARLY SCANS. PT UNDERSTOOD IT WOULD BE 2X A YEAR OR 4X A YEAR BEFORE APPTS.    HAS NOT HAD SCAN THIS YEAR AND WOULD LIKE TO KNOW IF IT NEEDS TO BE SCHED?    PLEASE RETURN CALL

## 2021-02-03 NOTE — TELEPHONE ENCOUNTER
Spoke with patient. She was actually mistaken and wants to go to [facility] for her CT. Order sent with instructions.

## 2021-02-04 ENCOUNTER — TELEPHONE (OUTPATIENT)
Dept: ONCOLOGY | Facility: CLINIC | Age: 81
End: 2021-02-04

## 2021-02-04 ENCOUNTER — TELEPHONE (OUTPATIENT)
Dept: GYNECOLOGIC ONCOLOGY | Facility: CLINIC | Age: 81
End: 2021-02-04

## 2021-02-04 NOTE — TELEPHONE ENCOUNTER
CALL FOR HERMINIA    Caller: DOUG TRUJILLO    Relationship to patient:     Best call back number: [phone]    PT CALLING TO SCHED SCAN.    PLEASE RETURN CALL.    THANK YOU

## 2021-02-04 NOTE — TELEPHONE ENCOUNTER
Caller: MAYELIN TRUJILLO    Relationship to patient: SELF    Best call back number: [phone]    STATES SHE HAD BEEN TALKING TO HERMINIA IN REGARD TO GETTING HER CT SCAN SCHEDULED. ASKS THAT HERMINIA CALLS HER BACK WHEN POSSIBLE

## 2021-02-24 ENCOUNTER — IMMUNIZATION (OUTPATIENT)
Dept: VACCINE CLINIC | Facility: HOSPITAL | Age: 81
End: 2021-02-24

## 2021-02-24 ENCOUNTER — HOSPITAL ENCOUNTER (OUTPATIENT)
Dept: CT IMAGING | Facility: HOSPITAL | Age: 81
Discharge: HOME | End: 2021-02-24
Admitting: NURSE PRACTITIONER

## 2021-02-24 DIAGNOSIS — C54.1 ENDOMETRIAL CANCER (HCC): ICD-10-CM

## 2021-02-24 LAB — CREAT BLDA-MCNC: 1.1 MG/DL (ref 0.6–1.3)

## 2021-02-24 PROCEDURE — 71260 CT THORAX DX C+: CPT

## 2021-02-24 PROCEDURE — 82565 ASSAY OF CREATININE: CPT

## 2021-02-24 PROCEDURE — 74177 CT ABD & PELVIS W/CONTRAST: CPT

## 2021-02-24 PROCEDURE — 91300 HC SARSCOV02 VAC 30MCG/0.3ML IM: CPT | Performed by: INTERNAL MEDICINE

## 2021-02-24 PROCEDURE — 25010000002 IOPAMIDOL 61 % SOLUTION: Performed by: NURSE PRACTITIONER

## 2021-02-24 PROCEDURE — 0001A: CPT | Performed by: INTERNAL MEDICINE

## 2021-02-24 RX ADMIN — IOPAMIDOL 70 ML: 612 INJECTION, SOLUTION INTRAVENOUS at 10:59

## 2021-02-25 ENCOUNTER — TELEPHONE (OUTPATIENT)
Dept: GYNECOLOGIC ONCOLOGY | Facility: CLINIC | Age: 81
End: 2021-02-25

## 2021-02-25 NOTE — TELEPHONE ENCOUNTER
----- Message from HERIBERTO Carr sent at 2/25/2021  8:25 AM EST -----  Please notify patient surveillance CT scan negative for disease. She has follow-up with me in office next month. Thanks!  
Pt notified and verbalized understanding.  
Statement Selected

## 2021-02-25 NOTE — TELEPHONE ENCOUNTER
----- Message from HERIBERTO Carr sent at 2/25/2021  8:25 AM EST -----  Please notify patient surveillance CT scan negative for disease. She has follow-up with me in office next month. Thanks!

## 2021-03-10 ENCOUNTER — OFFICE VISIT (OUTPATIENT)
Dept: GYNECOLOGIC ONCOLOGY | Facility: CLINIC | Age: 81
End: 2021-03-10

## 2021-03-10 VITALS
WEIGHT: 118 LBS | HEART RATE: 72 BPM | RESPIRATION RATE: 16 BRPM | TEMPERATURE: 97.7 F | SYSTOLIC BLOOD PRESSURE: 144 MMHG | DIASTOLIC BLOOD PRESSURE: 66 MMHG | BODY MASS INDEX: 20.14 KG/M2 | OXYGEN SATURATION: 96 % | HEIGHT: 64 IN

## 2021-03-10 DIAGNOSIS — C54.1 ENDOMETRIAL CANCER (HCC): Primary | ICD-10-CM

## 2021-03-10 DIAGNOSIS — R09.81 CHRONIC NASAL CONGESTION: ICD-10-CM

## 2021-03-10 DIAGNOSIS — R09.82 PND (POST-NASAL DRIP): ICD-10-CM

## 2021-03-10 PROCEDURE — 99214 OFFICE O/P EST MOD 30 MIN: CPT | Performed by: NURSE PRACTITIONER

## 2021-03-10 NOTE — PROGRESS NOTES
GYN ONCOLOGY CANCER SURVEILLANCE FOLLOW-UP    Yin Siddiqui  [id number]  1940    Subjective   Chief Complaint: Follow-up (no gyn complaints)        History of present illness:     Yin Siddiqui is a 80 y.o. year old female who is here today for ongoing surveillance of uterine carcinosarcoma, see history below. She is feeling generally well today and has no GYN complaints. She underwent CT surveillance 2/24/2021, negative for recurrent disease. She denies vaginal bleeding, pelvic pain, concerning lesions, and changes in bowel or bladder function.   At last visit we discussed her persistent clear nasal congestion and drainage since treatment. She was recommended to try Zyrtec-D daily for a time after failing multiple OTC antihistamines and Flonase PRN. Today, patient reports she gets some relief for a few hours in the morning, but by lunch time symptoms have returned. She is unsure what to do or try next.     Of note: Patient and her  tested positive for COVID-19 in early February 2021. They did not require inpatient management, but experienced severe fatigue and were in bed at home for about 2 weeks. She reports they have both since recovered well. She has received her first COVID vaccine in late Feb.        Cancer History:   Oncology/Hematology History   Uterine Carcinosarcoma    9/18/2019 Initial Diagnosis    Endometrial biopsy by Dr. Lam due to recurrent postmenopausal bleeding. Pathology showed cytologic atypia in background of extensive tumor necrosis.  =36.6. Referred to Gyn Oncology     9/18/2019 Imaging    CT abd/pelvis IMPRESSION:  1. Approximately 7 cm central uterine mass, strongly vascular along its  posterior lateral right margin at approximately 7:00 as noted above. No  evidence of invasion into the surrounding soft tissues.  2. No evidence of adenopathy or other metastatic disease.  3. 2 cm nonenhancing water density right ovarian cyst.  4. Incidentally noted small hepatic cyst  and normal variant bilateral  renal parapelvic cysts.  Pre-op CXR ARIS     9/27/2019 Surgery    Exploratory laparotomy, Type 1 radical hysterectomy, BSO, pelvic lymph node dissection, omentectomy, cystoscopy with bilateral temporary ureteral stents.     Final pathology revealed carcinosarcoma (MMMT), multiple foci measuring up to 7.8 cm, filling entire endometrial cavity, with minimal myometrial invasion (up to 1/11 mm, 9% invasion). 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 Sarcomatous component is undifferentiated. Cytology negative, no LVSI, nodes negative. Stage IA MMMT       11/6/2019 - 2/27/2020 Chemotherapy    OP UTERINE PACLitaxel / CARBOplatin (Q21D)  Toxicities included neutropenia requiring dose reductions (cycle 3&4) and G-CSF, neuropathy, and dehydration.   Cycle #6 delayed due to hospitalization.     11/19/2019 - 12/5/2019 Radiation    Radiation OncologyTreatment Course:  Yin Siddiqui received 3000 cGy in 5 fractions to vagina via High Dose Radiation - HDR, vaginal brachytherapy.      2/19/2020 - 2/23/2020 Other Event    Inpatient hospitalization due to dehydration, hypotension, SOA, N/V, and bloody diarrhea. Patient found to be anemic secondary to duodenal bleeding ulcer. Treated with interventional EGD, blood transfusion, and IV hydration.      3/19/2020 Imaging    Post-treatment CT chest, abdomen, pelvis negative for disease     6/3/2020 Survivorship    Survivorship Care Plan completed and discussed with patient.  Copy of Survivorship Care Plan provided to patient and primary care provider.     9/2/2020 Imaging    CT shows stable appearance of the chest, abdomen and pelvis with hepatic steatosis and hepatic cysts again noted as well as degenerative  changes of the spine without evidence for local pelvic recurrence or metastatic involvement     2/24/2021 Imaging    CT chest, abdomen, pelvis:  No evidence of acute intra-abdominal or pelvic abnormality. No CT evidence of acute  "intrathoracic abnormality. Chronic changes seen in the lung bases with no evidence of metastatic disease. The findings are stable and unchanged. No evidence of progression.           The current medication list and allergy list were reviewed and reconciled.     Past Medical History, Past Surgical History, Social History, Family History have been reviewed and are without significant changes except as mentioned.      Review of Systems   Constitutional: Positive for fatigue.   HENT: Positive for congestion and postnasal drip.    Respiratory: Negative.    Cardiovascular: Negative.    Gastrointestinal: Negative.    Genitourinary: Negative.    Musculoskeletal: Positive for arthralgias.   Psychiatric/Behavioral: Positive for confusion and sleep disturbance.         Objective   Physical Exam  Vital Signs: /66   Pulse 72   Temp 97.7 °F (36.5 °C) (Temporal)   Resp 16   Ht 162.6 cm (64.02\")   Wt 53.5 kg (118 lb)   SpO2 96%   BMI 20.24 kg/m²   Vitals:    03/10/21 1030   PainSc: 0-No pain           General Appearance:  alert, cooperative, no apparent distress, appears stated age and normal weight   Neurologic/Psychiatric: A&O x 3, gait steady, appropriate affect   HEENT:  Normocephalic, without obvious abnormality, mucous membranes moist   Abdomen:   Soft, non-tender, non-distended and no organomegaly   Lymph nodes: No cervical, supraclavicular, inguinal adenopathy noted   Pelvic: External Genitalia  atrophic, without lesions  Vagina  is pale, atrophic.  and narrow introitus  Vaginal Cuff  Female Vaginal Cuff: smooth, intact, without visible lesions and changes consistent with previous radiation  Uterus  surgically absent and no palpable masses  Ovaries  surgically absent bilaterally  Parametria  smooth  Rectovaginal  Female rectovaginal: deferred     ECOG score: 0     Karnofsky score: 100       Result Review :   The following data was reviewed by: HERIBERTO Carr on 03/10/2021:  Data reviewed: Radiologic " studies surveillance CT 2/24/2021   Tumor marker:     Date Value Ref Range Status   12/09/2020 7.0 0.0 - 38.1 U/mL Final   09/08/2020 7.0 0.0 - 38.1 U/mL Final   02/26/2020 7.0 0.0 - 38.1 U/mL Final   01/29/2020 8.1 0.0 - 38.1 U/mL Final       PHQ-9 Total Score: 0    Procedure Note:  No notes on file         Assessment and Plan:    Diagnoses and all orders for this visit:    1. Uterine Carcinosarcoma  (Primary)    2. Chronic nasal congestion  -     Ambulatory Referral to ENT (Otolaryngology)    3. PND (post-nasal drip)  -     Ambulatory Referral to ENT (Otolaryngology)          There is no evidence of disease upon today's exam.   For patients with uterine sarcomas, CT chest/abdomen/pelvis is recommended for surveillance every 3-6 months for the first 3 years, then every 6-12 months for the next 2 years. Annual to bi-annual imaging can be considered thereafter up to an additional 5 years depending on histology and initial staging. Due to early staging, we will plan for every 6 month CT scans, therefore, due next in 8-9/2021.   Continue every 3 month cancer surveillance visits until the 2 year fortino.   She is understanding to call with any changes in pelvic symptoms or general GYN concerns at any time between regularly scheduled visits.    Patient and I again discussed her persistent nasal congestion with drainage since treatment. Zyrtec-D only somewhat helpful. At this time, I am recommending referral to ENT for further evaluation. She v/u and agrees with plan.      Pain assessment was performed today as a part of patient’s care.  For patients with pain related to surgery, gynecologic malignancy or cancer treatment, the plan is as noted in the assessment/plan.  For patients with pain not related to these issues, they are to seek any further needed care from a more appropriate provider, such as PCP.      Follow-up:     Return to clinic in 3 months for ongoing cancer surveillance.      Electronically signed by  Petra Avalos, HERIBERTO on 03/10/21 at 11:05 EST

## 2021-03-17 ENCOUNTER — IMMUNIZATION (OUTPATIENT)
Dept: VACCINE CLINIC | Facility: HOSPITAL | Age: 81
End: 2021-03-17

## 2021-03-17 PROCEDURE — 91300 HC SARSCOV02 VAC 30MCG/0.3ML IM: CPT | Performed by: INTERNAL MEDICINE

## 2021-03-17 PROCEDURE — 0002A: CPT | Performed by: INTERNAL MEDICINE

## 2021-06-17 ENCOUNTER — TRANSCRIBE ORDERS (OUTPATIENT)
Dept: ADMINISTRATIVE | Facility: HOSPITAL | Age: 81
End: 2021-06-17

## 2021-06-17 DIAGNOSIS — Z12.31 VISIT FOR SCREENING MAMMOGRAM: Primary | ICD-10-CM

## 2021-06-18 ENCOUNTER — OFFICE VISIT (OUTPATIENT)
Dept: GYNECOLOGIC ONCOLOGY | Facility: CLINIC | Age: 81
End: 2021-06-18

## 2021-06-18 VITALS
RESPIRATION RATE: 18 BRPM | HEIGHT: 64 IN | TEMPERATURE: 96.9 F | BODY MASS INDEX: 20.08 KG/M2 | HEART RATE: 61 BPM | SYSTOLIC BLOOD PRESSURE: 133 MMHG | WEIGHT: 117.6 LBS | OXYGEN SATURATION: 99 % | DIASTOLIC BLOOD PRESSURE: 60 MMHG

## 2021-06-18 DIAGNOSIS — C54.1 ENDOMETRIAL CANCER (HCC): Primary | ICD-10-CM

## 2021-06-18 PROCEDURE — 99213 OFFICE O/P EST LOW 20 MIN: CPT | Performed by: OBSTETRICS & GYNECOLOGY

## 2021-06-18 NOTE — PROGRESS NOTES
GYN ONCOLOGY CANCER SURVEILLANCE FOLLOW-UP    Yin Siddiqui  [id number]  1940    Subjective   Chief Complaint: Follow-up (surveillance for endometrial cancer, no complaints)        History of present illness:     Yin Siddiqui is a 80 y.o. year old female who is here today for ongoing surveillance of uterine carcinosarcoma, see history below.     She underwent CT surveillance 2/24/2021, negative for recurrent disease.   Today, patient notes of no complaints, bowels and bladder normal. Pt reports she is walking two miles a day and has good energy. Pt states she has a good appetite.   She complains of fatigue, but is walking as above.    Of note: Patient and her  tested positive for COVID-19 in early February 2021. They did not require inpatient management, but experienced severe fatigue and were in bed at home for about 2 weeks. She reports they have both since recovered well. She has received her first COVID vaccine in late Feb.        Cancer History:   Oncology/Hematology History   Uterine Carcinosarcoma    9/18/2019 Initial Diagnosis    Endometrial biopsy by Dr. Lam due to recurrent postmenopausal bleeding. Pathology showed cytologic atypia in background of extensive tumor necrosis.  =36.6. Referred to Gyn Oncology     9/18/2019 Imaging    CT abd/pelvis IMPRESSION:  1. Approximately 7 cm central uterine mass, strongly vascular along its  posterior lateral right margin at approximately 7:00 as noted above. No  evidence of invasion into the surrounding soft tissues.  2. No evidence of adenopathy or other metastatic disease.  3. 2 cm nonenhancing water density right ovarian cyst.  4. Incidentally noted small hepatic cyst and normal variant bilateral  renal parapelvic cysts.  Pre-op CXR ARIS     9/27/2019 Surgery    Exploratory laparotomy, Type 1 radical hysterectomy, BSO, pelvic lymph node dissection, omentectomy, cystoscopy with bilateral temporary ureteral stents.     Final pathology revealed  carcinosarcoma (MMMT), multiple foci measuring up to 7.8 cm, filling entire endometrial cavity, with minimal myometrial invasion (up to 1/11 mm, 9% invasion). 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 Sarcomatous component is undifferentiated. Cytology negative, no LVSI, nodes negative. Stage IA MMMT       11/6/2019 - 2/27/2020 Chemotherapy    OP UTERINE PACLitaxel / CARBOplatin (Q21D)  Toxicities included neutropenia requiring dose reductions (cycle 3&4) and G-CSF, neuropathy, and dehydration.   Cycle #6 delayed due to hospitalization.     11/19/2019 - 12/5/2019 Radiation    Radiation OncologyTreatment Course:  Yin Siddiqui received 3000 cGy in 5 fractions to vagina via High Dose Radiation - HDR, vaginal brachytherapy.      2/19/2020 - 2/23/2020 Other Event    Inpatient hospitalization due to dehydration, hypotension, SOA, N/V, and bloody diarrhea. Patient found to be anemic secondary to duodenal bleeding ulcer. Treated with interventional EGD, blood transfusion, and IV hydration.      3/19/2020 Imaging    Post-treatment CT chest, abdomen, pelvis negative for disease     6/3/2020 Survivorship    Survivorship Care Plan completed and discussed with patient.  Copy of Survivorship Care Plan provided to patient and primary care provider.     9/2/2020 Imaging    CT shows stable appearance of the chest, abdomen and pelvis with hepatic steatosis and hepatic cysts again noted as well as degenerative  changes of the spine without evidence for local pelvic recurrence or metastatic involvement     2/24/2021 Imaging    CT chest, abdomen, pelvis:  No evidence of acute intra-abdominal or pelvic abnormality. No CT evidence of acute intrathoracic abnormality. Chronic changes seen in the lung bases with no evidence of metastatic disease. The findings are stable and unchanged. No evidence of progression.           The current medication list and allergy list were reviewed and reconciled.     Past Medical History, Past  "Surgical History, Social History, Family History have been reviewed and are without significant changes except as mentioned.      Review of Systems   Constitutional: Positive for fatigue.   HENT: Positive for congestion and postnasal drip.    Respiratory: Negative.    Cardiovascular: Negative.    Gastrointestinal: Negative.    Genitourinary: Negative.    Musculoskeletal: Positive for arthralgias.   Psychiatric/Behavioral: Positive for confusion and sleep disturbance.         Objective   Physical Exam  Vital Signs: /60   Pulse 61   Temp 96.9 °F (36.1 °C) (Temporal)   Resp 18   Ht 162.6 cm (64.02\")   Wt 53.3 kg (117 lb 9.6 oz)   SpO2 99%   BMI 20.18 kg/m²   Vitals:    06/18/21 1135   PainSc: 0-No pain           General Appearance:  alert, cooperative, no apparent distress, appears stated age and normal weight   Neurologic/Psychiatric: A&O x 3, gait steady, appropriate affect   HEENT:  Normocephalic, without obvious abnormality, mucous membranes moist  Lungs clear to auscultation bilaterally, normal effort  Cardiovascular heart regular rate and rhythm without murmur rub or gallop.  No extremity edema.   Abdomen:   Soft, non-tender, non-distended and no organomegaly   Lymph nodes: No cervical, supraclavicular, inguinal adenopathy noted   Pelvic: External Genitalia  atrophic, without lesions  Vagina  is pale, atrophic.  and narrow introitus  Vaginal Cuff  Female Vaginal Cuff: smooth, intact, without visible lesions and changes consistent with previous radiation  Uterus  surgically absent and no palpable masses  Ovaries  surgically absent bilaterally  Parametria  smooth  Rectovaginal  Female rectovaginal: deferred     ECOG score: 0     Karnofsky score: 100       Result Review :   The following data was reviewed by: Dasha Claudio MD on 03/10/2021:  Data reviewed: Radiologic studies surveillance CT 2/24/2021   Tumor marker:     Date Value Ref Range Status   12/09/2020 7.0 0.0 - 38.1 U/mL Final "   09/08/2020 7.0 0.0 - 38.1 U/mL Final   02/26/2020 7.0 0.0 - 38.1 U/mL Final   01/29/2020 8.1 0.0 - 38.1 U/mL Final       PHQ-9 Total Score:      Procedure Note:  No notes on file         Assessment and Plan:    Diagnoses and all orders for this visit:    1. Uterine Carcinosarcoma  (Primary)  -     CT Abdomen Pelvis With Contrast; Future  -     CT Chest With Contrast; Future      There is no evidence of disease upon today's exam.   Patient has been followed in keeping with uterine sarcoma surveillance.  Due to early staging, we will plan for every 6 month CT scans and CT of chest abdomen and pelvis was ordered for September 2021.  Continue every 3 month cancer surveillance visits until the 2 year fortino.   She is understanding to call with any changes in pelvic symptoms or general GYN concerns at any time between regularly scheduled visits.    She is undergoing visits with neurology due to cognition issues.  This is fairly subtle until we started talking about dates.  Her jose elias personality is well preserved.    Pain assessment was performed today as a part of patient’s care.  For patients with pain related to surgery, gynecologic malignancy or cancer treatment, the plan is as noted in the assessment/plan.  For patients with pain not related to these issues, they are to seek any further needed care from a more appropriate provider, such as PCP.    *History of present illness contributed to by , medical assistant with the office, and ascribing capacity.  Bold print area only.*    Follow-up:     Return to clinic in 3 months for ongoing cancer surveillance.      Electronically signed by Dasha Claudio MD on 06/18/21 at 11:50 EDT

## 2021-08-10 ENCOUNTER — TELEPHONE (OUTPATIENT)
Dept: GYNECOLOGIC ONCOLOGY | Facility: CLINIC | Age: 81
End: 2021-08-10

## 2021-08-10 NOTE — TELEPHONE ENCOUNTER
"Informed  that [facility] does what is called \"bundling codes\" Patient will have contrast at scan.   "

## 2021-08-10 NOTE — TELEPHONE ENCOUNTER
Caller: DOUG    Relationship:     Best call back number: [phone]    What is the best time to reach you: ANY    Who are you requesting to speak with: MICHAEL BENAVIDES    Do you know the name of the person who called: DOUG    What was the call regarding: DOUG STATED THEY RCV'D A LETTER FROM THE [employer] STATING THAT THE CT SCAN IS APPROVED BUT W/OUT CONTRAST & PATIENT NORMALLY GETS IT W/ & THE APPT. SHOWS IT W/. PATIENT STATED THIS HAPPENED LAST TIME. SCAN IS TONNY. FOR 8/20/2021    Do you require a callback: YES,PLEASE TO VERIFY THE INFO.

## 2021-08-12 ENCOUNTER — HOSPITAL ENCOUNTER (OUTPATIENT)
Dept: MAMMOGRAPHY | Facility: HOSPITAL | Age: 81
Discharge: HOME | End: 2021-08-12
Admitting: FAMILY MEDICINE

## 2021-08-12 DIAGNOSIS — Z12.31 VISIT FOR SCREENING MAMMOGRAM: ICD-10-CM

## 2021-08-12 PROCEDURE — 77063 BREAST TOMOSYNTHESIS BI: CPT

## 2021-08-12 PROCEDURE — 77067 SCR MAMMO BI INCL CAD: CPT

## 2021-08-12 PROCEDURE — 77067 SCR MAMMO BI INCL CAD: CPT | Performed by: RADIOLOGY

## 2021-08-12 PROCEDURE — 77063 BREAST TOMOSYNTHESIS BI: CPT | Performed by: RADIOLOGY

## 2021-08-20 ENCOUNTER — HOSPITAL ENCOUNTER (OUTPATIENT)
Dept: CT IMAGING | Facility: HOSPITAL | Age: 81
End: 2021-08-20

## 2021-08-25 ENCOUNTER — HOSPITAL ENCOUNTER (OUTPATIENT)
Dept: CT IMAGING | Facility: HOSPITAL | Age: 81
Discharge: HOME | End: 2021-08-25
Admitting: OBSTETRICS & GYNECOLOGY

## 2021-08-25 DIAGNOSIS — C54.1 ENDOMETRIAL CANCER (HCC): ICD-10-CM

## 2021-08-25 LAB — CREAT BLDA-MCNC: 1.1 MG/DL (ref 0.6–1.3)

## 2021-08-25 PROCEDURE — 74177 CT ABD & PELVIS W/CONTRAST: CPT

## 2021-08-25 PROCEDURE — 25010000002 IOPAMIDOL 61 % SOLUTION: Performed by: OBSTETRICS & GYNECOLOGY

## 2021-08-25 PROCEDURE — 82565 ASSAY OF CREATININE: CPT

## 2021-08-25 PROCEDURE — 71260 CT THORAX DX C+: CPT

## 2021-08-25 RX ADMIN — IOPAMIDOL 75 ML: 612 INJECTION, SOLUTION INTRAVENOUS at 10:52

## 2021-08-26 ENCOUNTER — TELEPHONE (OUTPATIENT)
Dept: GYNECOLOGIC ONCOLOGY | Facility: CLINIC | Age: 81
End: 2021-08-26

## 2021-08-26 NOTE — TELEPHONE ENCOUNTER
Left  for patient notifying her that her CT scan results were normal, advised to call with questions.

## 2021-09-29 ENCOUNTER — LAB (OUTPATIENT)
Dept: LAB | Facility: HOSPITAL | Age: 81
End: 2021-09-29

## 2021-09-29 ENCOUNTER — OFFICE VISIT (OUTPATIENT)
Dept: GYNECOLOGIC ONCOLOGY | Facility: CLINIC | Age: 81
End: 2021-09-29

## 2021-09-29 VITALS
RESPIRATION RATE: 18 BRPM | OXYGEN SATURATION: 98 % | SYSTOLIC BLOOD PRESSURE: 119 MMHG | WEIGHT: 117 LBS | HEART RATE: 64 BPM | HEIGHT: 64 IN | BODY MASS INDEX: 19.97 KG/M2 | TEMPERATURE: 97.8 F | DIASTOLIC BLOOD PRESSURE: 59 MMHG

## 2021-09-29 DIAGNOSIS — C54.1 ENDOMETRIAL CANCER (HCC): Primary | ICD-10-CM

## 2021-09-29 DIAGNOSIS — R09.81 CHRONIC NASAL CONGESTION: ICD-10-CM

## 2021-09-29 LAB — CANCER AG125 SERPL QL: 7.5 U/ML (ref 0–38.1)

## 2021-09-29 PROCEDURE — 99214 OFFICE O/P EST MOD 30 MIN: CPT | Performed by: NURSE PRACTITIONER

## 2021-09-29 PROCEDURE — 36415 COLL VENOUS BLD VENIPUNCTURE: CPT | Performed by: NURSE PRACTITIONER

## 2021-09-29 PROCEDURE — 86304 IMMUNOASSAY TUMOR CA 125: CPT | Performed by: NURSE PRACTITIONER

## 2021-09-29 RX ORDER — FLUTICASONE PROPIONATE 50 MCG
2 SPRAY, SUSPENSION (ML) NASAL 2 TIMES DAILY
Qty: 18.2 ML | Refills: 3 | Status: SHIPPED | OUTPATIENT
Start: 2021-09-29 | End: 2022-07-08

## 2021-09-29 RX ORDER — IPRATROPIUM BROMIDE 21 UG/1
SPRAY, METERED NASAL
COMMUNITY
Start: 2021-09-04 | End: 2023-01-11

## 2021-09-29 RX ORDER — DONEPEZIL HYDROCHLORIDE 10 MG/1
TABLET, FILM COATED ORAL
COMMUNITY
Start: 2021-08-17

## 2021-09-29 NOTE — PROGRESS NOTES
"GYN ONCOLOGY CANCER SURVEILLANCE FOLLOW-UP    Yin Siddiqui  [id number]  1940    Subjective   Chief Complaint: Follow-up (No Complaints)        History of present illness:  {CC  Problem List  Visit Diagnosis   Encounters  Notes  Medications  Labs  Result Review Imaging  Media :23}   Yin Siddiqui is a 80 y.o. year old female who is here today for ongoing surveillance of {GYNCANCERS:47890::\"see Cancer History\"}. ***        Cancer History:   Oncology/Hematology History   Uterine Carcinosarcoma    9/18/2019 Initial Diagnosis    Endometrial biopsy by Dr. Lam due to recurrent postmenopausal bleeding. Pathology showed cytologic atypia in background of extensive tumor necrosis.  =36.6. Referred to Gyn Oncology     9/18/2019 Imaging    CT abd/pelvis IMPRESSION:  1. Approximately 7 cm central uterine mass, strongly vascular along its  posterior lateral right margin at approximately 7:00 as noted above. No  evidence of invasion into the surrounding soft tissues.  2. No evidence of adenopathy or other metastatic disease.  3. 2 cm nonenhancing water density right ovarian cyst.  4. Incidentally noted small hepatic cyst and normal variant bilateral  renal parapelvic cysts.  Pre-op CXR ARIS     9/27/2019 Surgery    Exploratory laparotomy, Type 1 radical hysterectomy, BSO, pelvic lymph node dissection, omentectomy, cystoscopy with bilateral temporary ureteral stents.     Final pathology revealed carcinosarcoma (MMMT), multiple foci measuring up to 7.8 cm, filling entire endometrial cavity, with minimal myometrial invasion (up to 1/11 mm, 9% invasion). 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 Sarcomatous component is undifferentiated. Cytology negative, no LVSI, nodes negative. Stage IA MMMT       11/6/2019 - 2/27/2020 Chemotherapy    OP UTERINE PACLitaxel / CARBOplatin (Q21D)  Toxicities included neutropenia requiring dose reductions (cycle 3&4) and G-CSF, neuropathy, and dehydration.   Cycle #6 " "delayed due to hospitalization.     11/19/2019 - 12/5/2019 Radiation    Radiation OncologyTreatment Course:  Yin Siddiqui received 3000 cGy in 5 fractions to vagina via High Dose Radiation - HDR, vaginal brachytherapy.      2/19/2020 - 2/23/2020 Other Event    Inpatient hospitalization due to dehydration, hypotension, SOA, N/V, and bloody diarrhea. Patient found to be anemic secondary to duodenal bleeding ulcer. Treated with interventional EGD, blood transfusion, and IV hydration.      3/19/2020 Imaging    Post-treatment CT chest, abdomen, pelvis negative for disease     6/3/2020 Survivorship    Survivorship Care Plan completed and discussed with patient.  Copy of Survivorship Care Plan provided to patient and primary care provider.     9/2/2020 Imaging    CT shows stable appearance of the chest, abdomen and pelvis with hepatic steatosis and hepatic cysts again noted as well as degenerative  changes of the spine without evidence for local pelvic recurrence or metastatic involvement     2/24/2021 Imaging    CT chest, abdomen, pelvis:  No evidence of acute intra-abdominal or pelvic abnormality. No CT evidence of acute intrathoracic abnormality. Chronic changes seen in the lung bases with no evidence of metastatic disease. The findings are stable and unchanged. No evidence of progression.           The current medication list and allergy list were reviewed and reconciled.     Past Medical History, Past Surgical History, Social History, Family History have been reviewed and are without significant changes except as mentioned.      Review of Systems      Objective   Physical Exam  Vital Signs: /59 Comment: LUE  Pulse 64   Temp 97.8 °F (36.6 °C) (Infrared)   Resp 18   Ht 162.6 cm (64\")   Wt 53.1 kg (117 lb)   SpO2 98% Comment: RA  BMI 20.08 kg/m²   Vitals:    09/29/21 1320   PainSc: 0-No pain           General Appearance:  {GynOnc General Appearance:86314::\"alert, cooperative, no apparent " "distress\",\"appears stated age\"}   Neurologic/Psychiatric: A&O x 3, gait steady, appropriate affect   HEENT:  Normocephalic, without obvious abnormality, mucous membranes moist   Abdomen:   {[facility] Abdomen Exam:36082::\"Soft\",\"non-tender\",\"non-distended\",\"no organomegaly\"}   Lymph nodes: No cervical, supraclavicular, inguinal adenopathy noted   Pelvic: {GynOnc Pelvic Exam:64335}     ECOG score: 0             Result Review :{ Labs  Result Review  Imaging  Med Tab  Media :23}   {The following data was reviewed by (Optional):74436}  {Ambulatory Labs (Optional):46535}  {Data reviewed (Optional):31088:::1}   Last imaging study was ***.   Tumor marker:     Date Value Ref Range Status   12/09/2020 7.0 0.0 - 38.1 U/mL Final   09/08/2020 7.0 0.0 - 38.1 U/mL Final   02/26/2020 7.0 0.0 - 38.1 U/mL Final   01/29/2020 8.1 0.0 - 38.1 U/mL Final       PHQ-9 Total Score:      Procedure Note:  No notes on file         Assessment and Plan:  {CC Problem List  Visit Diagnosis  ROS  Review (Popup)  [facility] Maintenance  Quality  BestPractice  Medications  SmartSets  SnapShot Encounters  Media :23}  Diagnoses and all orders for this visit:    1. Uterine Carcinosarcoma  (Primary)            ***    Pain assessment was performed today as a part of patient’s care.  For patients with pain related to surgery, gynecologic malignancy or cancer treatment, the plan is as noted in the assessment/plan.  For patients with pain not related to these issues, they are to seek any further needed care from a more appropriate provider, such as PCP.    Advance Care Planning   {Advance Directive Status:61060}       Follow-up:   {Instructions Charge Capture  Follow-up Communications :23}  Return to clinic in {Time; 3 months to 1 year:41041} for {RTCgynonc:02025}.      Electronically signed by HERIBERTO Carr on 09/29/21 at 13:50 EDT      "

## 2021-09-29 NOTE — PROGRESS NOTES
GYN ONCOLOGY CANCER SURVEILLANCE FOLLOW-UP    Yin Siddiqui  [id number]  1940    Subjective   Chief Complaint: Follow-up (No gyn complaints, ongoing bothersome congestion)        History of present illness:     Yin Siddiqui is a 80 y.o. year old female who is here today for ongoing surveillance of uterine carcinosarcoma, see history below. She is feeling generally well today and has no GYN complaints. She underwent CT surveillance 8/25/2021, negative for recurrent disease. She denies vaginal bleeding, pelvic pain, concerning lesions, and changes in bowel or bladder function.   At last several visits we have discussed her persistent clear nasal congestion and drainage since treatment. I referred her to ENT 6 months ago, but patient and  state this was not helpful. She feels she was not given any help, but just told to keep tissues with her. To date she has tried Zyrtec-D daily, multiple OTC antihistamines, and Flonase PRN. She is now taking ipratropium nasal spray and feels this is somewhat helpful, but relief is not complete.        Cancer History:   Oncology/Hematology History   Uterine Carcinosarcoma    9/18/2019 Initial Diagnosis    Endometrial biopsy by Dr. Lam due to recurrent postmenopausal bleeding. Pathology showed cytologic atypia in background of extensive tumor necrosis.  =36.6. Referred to Gyn Oncology     9/18/2019 Imaging    CT abd/pelvis IMPRESSION:  1. Approximately 7 cm central uterine mass, strongly vascular along its  posterior lateral right margin at approximately 7:00 as noted above. No  evidence of invasion into the surrounding soft tissues.  2. No evidence of adenopathy or other metastatic disease.  3. 2 cm nonenhancing water density right ovarian cyst.  4. Incidentally noted small hepatic cyst and normal variant bilateral  renal parapelvic cysts.  Pre-op CXR ARIS     9/27/2019 Surgery    Exploratory laparotomy, Type 1 radical hysterectomy, BSO, pelvic lymph node  dissection, omentectomy, cystoscopy with bilateral temporary ureteral stents.     Final pathology revealed carcinosarcoma (MMMT), multiple foci measuring up to 7.8 cm, filling entire endometrial cavity, with minimal myometrial invasion (up to 1/11 mm, 9% invasion). 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 Sarcomatous component is undifferentiated. Cytology negative, no LVSI, nodes negative. Stage IA MMMT       11/6/2019 - 2/27/2020 Chemotherapy    OP UTERINE PACLitaxel / CARBOplatin (Q21D)  Toxicities included neutropenia requiring dose reductions (cycle 3&4) and G-CSF, neuropathy, and dehydration.   Cycle #6 delayed due to hospitalization.     11/19/2019 - 12/5/2019 Radiation    Radiation OncologyTreatment Course:  Yin Siddiqui received 3000 cGy in 5 fractions to vagina via High Dose Radiation - HDR, vaginal brachytherapy.      2/19/2020 - 2/23/2020 Other Event    Inpatient hospitalization due to dehydration, hypotension, SOA, N/V, and bloody diarrhea. Patient found to be anemic secondary to duodenal bleeding ulcer. Treated with interventional EGD, blood transfusion, and IV hydration.      3/19/2020 Imaging    Post-treatment CT chest, abdomen, pelvis negative for disease     6/3/2020 Survivorship    Survivorship Care Plan completed and discussed with patient.  Copy of Survivorship Care Plan provided to patient and primary care provider.     9/2/2020 Imaging    CT shows stable appearance of the chest, abdomen and pelvis with hepatic steatosis and hepatic cysts again noted as well as degenerative  changes of the spine without evidence for local pelvic recurrence or metastatic involvement     2/24/2021 Imaging    CT chest, abdomen, pelvis:  No evidence of acute intra-abdominal or pelvic abnormality. No CT evidence of acute intrathoracic abnormality. Chronic changes seen in the lung bases with no evidence of metastatic disease. The findings are stable and unchanged. No evidence of progression.    "  8/25/2021 Imaging    CT chest, abdomen, pelvis:  Stable appearance of the chest, abdomen and pelvis without evidence for recurrent or active malignancy. No acute pathology otherwise noted.           The current medication list and allergy list were reviewed and reconciled.     Past Medical History, Past Surgical History, Social History, Family History have been reviewed and are without significant changes except as mentioned.      Review of Systems   HENT: Positive for congestion and postnasal drip.    Respiratory: Negative.    Cardiovascular: Negative.    Gastrointestinal: Negative.    Genitourinary: Negative.    Musculoskeletal: Positive for arthralgias.         Objective   Physical Exam  Vital Signs: /59 Comment: LUE  Pulse 64   Temp 97.8 °F (36.6 °C) (Infrared)   Resp 18   Ht 162.6 cm (64\")   Wt 53.1 kg (117 lb)   SpO2 98% Comment: RA  BMI 20.08 kg/m²   Vitals:    09/29/21 1320   PainSc: 0-No pain           General Appearance:  alert, cooperative, no apparent distress, appears stated age and normal weight   Neurologic/Psychiatric: A&O x 3, gait steady, appropriate affect   HEENT:  Normocephalic, without obvious abnormality, mucous membranes moist   Abdomen:   Soft, non-tender, non-distended and no organomegaly   Lymph nodes: No cervical, supraclavicular, inguinal adenopathy noted   Pelvic: External Genitalia  atrophic, without lesions  Vagina  is pale, atrophic.  and narrow introitus  Vaginal Cuff  Female Vaginal Cuff: smooth, intact, without visible lesions and changes consistent with previous radiation  Uterus  surgically absent and no palpable masses  Ovaries  surgically absent bilaterally  Parametria  smooth  Rectovaginal  Female rectovaginal: deferred     ECOG score: 0             Result Review :   The following data was reviewed by: HERIBERTO Carr on 09/29/21:  Data reviewed: Radiologic studies surveillance CT 8/25/2021   Tumor marker:     Date Value Ref Range Status "   12/09/2020 7.0 0.0 - 38.1 U/mL Final   09/08/2020 7.0 0.0 - 38.1 U/mL Final   02/26/2020 7.0 0.0 - 38.1 U/mL Final   01/29/2020 8.1 0.0 - 38.1 U/mL Final       Procedure Note:  No notes on file         Assessment and Plan:    Diagnoses and all orders for this visit:    1. Uterine Carcinosarcoma  (Primary)  -         2. Chronic nasal congestion    Other orders  -     fluticasone (Flonase) 50 MCG/ACT nasal spray; 2 sprays into the nostril(s) as directed by provider 2 (two) times a day.  Dispense: 18.2 mL; Refill: 3          There is no evidence of disease upon today's exam. She will be notified of CA-125 results upon their return.   For patients with uterine sarcomas, CT chest/abdomen/pelvis is recommended for surveillance every 3-6 months for the first 3 years, then every 6-12 months for the next 2 years. Annual to bi-annual imaging can be considered thereafter up to an additional 5 years depending on histology and initial staging. Due to early staging, we will plan for every 6 month CT scans, therefore, due next in 2-3/2022.  Continue every 3 month cancer surveillance visits until the 2 year fortino.   She is understanding to call with any changes in pelvic symptoms or general GYN concerns at any time between regularly scheduled visits.    Patient and I again discussed her persistent nasal congestion with drainage since treatment. Patient is getting some relief, but not complete, with ipratropium nasal spray. She is instructed to continue this and resume Flonase BID, back off if becomes too dry. If symptoms persist, may seek ENT second-opinion as patient and  not happy with first referral.     Pain assessment was performed today as a part of patient’s care.  For patients with pain related to surgery, gynecologic malignancy or cancer treatment, the plan is as noted in the assessment/plan.  For patients with pain not related to these issues, they are to seek any further needed care from a more appropriate  provider, such as PCP.      Follow-up:     Return to clinic in 3 months for ongoing cancer surveillance.      Electronically signed by HERIBERTO Carr on 09/29/21 at 14:32 EDT

## 2021-09-30 ENCOUNTER — TELEPHONE (OUTPATIENT)
Dept: GYNECOLOGIC ONCOLOGY | Facility: CLINIC | Age: 81
End: 2021-09-30

## 2021-09-30 NOTE — TELEPHONE ENCOUNTER
----- Message from HERIBERTO Carr sent at 9/30/2021  9:05 AM EDT -----  Please notify CA-125 low/stable. Thanks!

## 2022-01-03 ENCOUNTER — OFFICE VISIT (OUTPATIENT)
Dept: GYNECOLOGIC ONCOLOGY | Facility: CLINIC | Age: 82
End: 2022-01-03

## 2022-01-03 VITALS
SYSTOLIC BLOOD PRESSURE: 115 MMHG | RESPIRATION RATE: 18 BRPM | DIASTOLIC BLOOD PRESSURE: 55 MMHG | OXYGEN SATURATION: 98 % | WEIGHT: 117 LBS | BODY MASS INDEX: 21.53 KG/M2 | HEIGHT: 62 IN | HEART RATE: 66 BPM | TEMPERATURE: 96.9 F

## 2022-01-03 DIAGNOSIS — Z85.42 HISTORY OF UTERINE CANCER: Primary | ICD-10-CM

## 2022-01-03 DIAGNOSIS — R09.81 CHRONIC NASAL CONGESTION: ICD-10-CM

## 2022-01-03 PROCEDURE — 99213 OFFICE O/P EST LOW 20 MIN: CPT | Performed by: NURSE PRACTITIONER

## 2022-01-03 NOTE — PROGRESS NOTES
GYN ONCOLOGY CANCER SURVEILLANCE FOLLOW-UP    Yin Siddiqui  [id number]  1940    Subjective   Chief Complaint: Uterine Cancer (No gyn Complaints)        History of present illness:     Yin Siddiqui is a 81 y.o. year old female who is here today for ongoing surveillance of uterine carcinosarcoma, see history below. She is approaching 2 years since completion of treatment. She is feeling generally well today and has no GYN complaints. She denies vaginal bleeding, pelvic pain, concerning lesions, and changes in bowel or bladder function.   At last several visits we have discussed her persistent clear nasal congestion and drainage since treatment. Iipratropium nasal spray several times daily is somewhat helpful, but relief is not complete. She is not currently using an antihistamine.        Cancer History:   Oncology/Hematology History   Uterine Carcinosarcoma    9/18/2019 Initial Diagnosis    Endometrial biopsy by Dr. Lam due to recurrent postmenopausal bleeding. Pathology showed cytologic atypia in background of extensive tumor necrosis.  =36.6. Referred to Gyn Oncology     9/18/2019 Imaging    CT abd/pelvis IMPRESSION:  1. Approximately 7 cm central uterine mass, strongly vascular along its  posterior lateral right margin at approximately 7:00 as noted above. No  evidence of invasion into the surrounding soft tissues.  2. No evidence of adenopathy or other metastatic disease.  3. 2 cm nonenhancing water density right ovarian cyst.  4. Incidentally noted small hepatic cyst and normal variant bilateral  renal parapelvic cysts.  Pre-op CXR ARIS     9/27/2019 Surgery    Exploratory laparotomy, Type 1 radical hysterectomy, BSO, pelvic lymph node dissection, omentectomy, cystoscopy with bilateral temporary ureteral stents.     Final pathology revealed carcinosarcoma (MMMT), multiple foci measuring up to 7.8 cm, filling entire endometrial cavity, with minimal myometrial invasion (up to 1/11 mm, 9% invasion).  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 Sarcomatous component is undifferentiated. Cytology negative, no LVSI, nodes negative. Stage IA MMMT       11/6/2019 - 2/27/2020 Chemotherapy    OP UTERINE PACLitaxel / CARBOplatin (Q21D)  Toxicities included neutropenia requiring dose reductions (cycle 3&4) and G-CSF, neuropathy, and dehydration.   Cycle #6 delayed due to hospitalization.     11/19/2019 - 12/5/2019 Radiation    Radiation OncologyTreatment Course:  Yin Siddiqui received 3000 cGy in 5 fractions to vagina via High Dose Radiation - HDR, vaginal brachytherapy.      2/19/2020 - 2/23/2020 Other Event    Inpatient hospitalization due to dehydration, hypotension, SOA, N/V, and bloody diarrhea. Patient found to be anemic secondary to duodenal bleeding ulcer. Treated with interventional EGD, blood transfusion, and IV hydration.      3/19/2020 Imaging    Post-treatment CT chest, abdomen, pelvis negative for disease     6/3/2020 Survivorship    Survivorship Care Plan completed and discussed with patient.  Copy of Survivorship Care Plan provided to patient and primary care provider.     9/2/2020 Imaging    CT shows stable appearance of the chest, abdomen and pelvis with hepatic steatosis and hepatic cysts again noted as well as degenerative  changes of the spine without evidence for local pelvic recurrence or metastatic involvement     2/24/2021 Imaging    CT chest, abdomen, pelvis:  No evidence of acute intra-abdominal or pelvic abnormality. No CT evidence of acute intrathoracic abnormality. Chronic changes seen in the lung bases with no evidence of metastatic disease. The findings are stable and unchanged. No evidence of progression.     8/25/2021 Imaging    CT chest, abdomen, pelvis:  Stable appearance of the chest, abdomen and pelvis without evidence for recurrent or active malignancy. No acute pathology otherwise noted.           The current medication list and allergy list were reviewed and  "reconciled.     Past Medical History, Past Surgical History, Social History, Family History have been reviewed and are without significant changes except as mentioned.      Review of Systems   HENT: Positive for congestion and postnasal drip.    Respiratory: Negative.    Cardiovascular: Negative.    Gastrointestinal: Negative.    Genitourinary: Negative.    Musculoskeletal: Positive for arthralgias.         Objective   Physical Exam  Vital Signs: /55 Comment: LUE  Pulse 66   Temp 96.9 °F (36.1 °C) (Infrared)   Resp 18   Ht 157.5 cm (62\")   Wt 53.1 kg (117 lb)   SpO2 98% Comment: RA  BMI 21.40 kg/m²   Vitals:    01/03/22 0955   PainSc: 0-No pain           General Appearance:  alert, cooperative, no apparent distress, appears stated age and normal weight   Neurologic/Psychiatric: A&O x 3, gait steady, appropriate affect   HEENT:  Normocephalic, without obvious abnormality, mucous membranes moist   Abdomen:   Soft, non-tender, non-distended and no organomegaly   Lymph nodes: No cervical, supraclavicular, inguinal adenopathy noted   Pelvic: External Genitalia  atrophic, without lesions  Vagina  is pale, atrophic.  and narrow introitus  Vaginal Cuff  Female Vaginal Cuff: smooth, intact, without visible lesions and changes consistent with previous radiation  Uterus  surgically absent and no palpable masses  Ovaries  surgically absent bilaterally  Parametria  smooth  Rectovaginal  Female rectovaginal: deferred     ECOG score: 0             Result Review :   The following data was reviewed by: HERIBERTO Carr on 01/03/22:  Data reviewed: Radiologic studies surveillance CT 8/25/2021   Tumor marker:     Date Value Ref Range Status   09/29/2021 7.5 0.0 - 38.1 U/mL Final   12/09/2020 7.0 0.0 - 38.1 U/mL Final   09/08/2020 7.0 0.0 - 38.1 U/mL Final   02/26/2020 7.0 0.0 - 38.1 U/mL Final       Procedure Note:  No notes on file         Assessment and Plan:    Diagnoses and all orders for this " visit:    1. History of uterine cancer (Primary)  -     CT Abdomen Pelvis With Contrast; Future  -     CT Chest With Contrast; Future    2. Chronic nasal congestion          There is no evidence of disease upon today's exam. She is approaching 2 years out since completion of treatment and doing well. She may now go to every 6 month surveillance visits. We will plan for surveillance CT prior to next visit, then PRN if negative for disease.   She is understanding to call with any changes in pelvic symptoms or general GYN concerns at any time between regularly scheduled visits.    Patient and I again discussed her persistent nasal congestion with drainage since treatment. Patient is getting some relief, but not complete, with ipratropium nasal spray. She is instructed to continue this and resume Flonase BID, back off if becomes too dry. If symptoms persist, may seek ENT second-opinion as patient and  not happy with first referral.     Pain assessment was performed today as a part of patient’s care.  For patients with pain related to surgery, gynecologic malignancy or cancer treatment, the plan is as noted in the assessment/plan.  For patients with pain not related to these issues, they are to seek any further needed care from a more appropriate provider, such as PCP.      Follow-up:     Return to clinic in 6 months for ongoing cancer surveillance.      Electronically signed by HERIBERTO Carr on 01/03/22 at 10:38 EST

## 2022-06-29 ENCOUNTER — TELEPHONE (OUTPATIENT)
Dept: GYNECOLOGIC ONCOLOGY | Facility: CLINIC | Age: 82
End: 2022-06-29

## 2022-06-29 ENCOUNTER — HOSPITAL ENCOUNTER (OUTPATIENT)
Dept: CT IMAGING | Facility: HOSPITAL | Age: 82
Discharge: HOME | End: 2022-06-29
Admitting: NURSE PRACTITIONER

## 2022-06-29 DIAGNOSIS — Z85.42 HISTORY OF UTERINE CANCER: ICD-10-CM

## 2022-06-29 LAB — CREAT BLDA-MCNC: 1 MG/DL (ref 0.6–1.3)

## 2022-06-29 PROCEDURE — 71260 CT THORAX DX C+: CPT

## 2022-06-29 PROCEDURE — 74177 CT ABD & PELVIS W/CONTRAST: CPT

## 2022-06-29 PROCEDURE — 25010000002 IOPAMIDOL 61 % SOLUTION: Performed by: NURSE PRACTITIONER

## 2022-06-29 PROCEDURE — 82565 ASSAY OF CREATININE: CPT

## 2022-06-29 RX ADMIN — IOPAMIDOL 80 ML: 612 INJECTION, SOLUTION INTRAVENOUS at 10:58

## 2022-06-29 NOTE — TELEPHONE ENCOUNTER
----- Message from HERIBERTO Carr sent at 6/29/2022  2:09 PM EDT -----  Please notify patient surveillance CT negative for disease. We will review in detail at her visit on 7/8/22. Thanks!

## 2022-07-07 ENCOUNTER — TRANSCRIBE ORDERS (OUTPATIENT)
Dept: ADMINISTRATIVE | Facility: HOSPITAL | Age: 82
End: 2022-07-07

## 2022-07-07 DIAGNOSIS — Z12.31 VISIT FOR SCREENING MAMMOGRAM: Primary | ICD-10-CM

## 2022-07-08 ENCOUNTER — OFFICE VISIT (OUTPATIENT)
Dept: GYNECOLOGIC ONCOLOGY | Facility: CLINIC | Age: 82
End: 2022-07-08

## 2022-07-08 VITALS
SYSTOLIC BLOOD PRESSURE: 133 MMHG | HEART RATE: 68 BPM | TEMPERATURE: 97.3 F | OXYGEN SATURATION: 96 % | BODY MASS INDEX: 21.93 KG/M2 | DIASTOLIC BLOOD PRESSURE: 63 MMHG | RESPIRATION RATE: 14 BRPM | WEIGHT: 119.9 LBS

## 2022-07-08 DIAGNOSIS — Z85.42 HISTORY OF ENDOMETRIAL CANCER: Primary | ICD-10-CM

## 2022-07-08 DIAGNOSIS — R09.81 CHRONIC NASAL CONGESTION: ICD-10-CM

## 2022-07-08 PROCEDURE — 99213 OFFICE O/P EST LOW 20 MIN: CPT | Performed by: NURSE PRACTITIONER

## 2022-07-08 NOTE — PROGRESS NOTES
GYN ONCOLOGY CANCER SURVEILLANCE FOLLOW-UP    Yin Siddiqui  [id number]  1940    Subjective   Chief Complaint: Uterine Cancer (No new complaints)        History of present illness:     Yin Siddiqui is a 81 y.o. year old female who is here today for ongoing surveillance of uterine carcinosarcoma, see history below. She is 2.5 years since completion of treatment. She is accompanied by her . She is feeling generally well today and has no GYN complaints. She denies vaginal bleeding, pelvic pain, concerning lesions, and changes in bowel or bladder function. She continues to have persistent clear nasal congestion and drainage since treatment. Iipratropium nasal spray several times daily is somewhat helpful. Symptoms unchanged since last visit.          Cancer History:   Oncology/Hematology History   Uterine Carcinosarcoma    9/18/2019 Initial Diagnosis    Endometrial biopsy by Dr. Lam due to recurrent postmenopausal bleeding. Pathology showed cytologic atypia in background of extensive tumor necrosis.  =36.6. Referred to Gyn Oncology     9/18/2019 Imaging    CT abd/pelvis IMPRESSION:  1. Approximately 7 cm central uterine mass, strongly vascular along its  posterior lateral right margin at approximately 7:00 as noted above. No  evidence of invasion into the surrounding soft tissues.  2. No evidence of adenopathy or other metastatic disease.  3. 2 cm nonenhancing water density right ovarian cyst.  4. Incidentally noted small hepatic cyst and normal variant bilateral  renal parapelvic cysts.  Pre-op CXR ARIS     9/27/2019 Surgery    Exploratory laparotomy, Type 1 radical hysterectomy, BSO, pelvic lymph node dissection, omentectomy, cystoscopy with bilateral temporary ureteral stents.     Final pathology revealed carcinosarcoma (MMMT), multiple foci measuring up to 7.8 cm, filling entire endometrial cavity, with minimal myometrial invasion (up to 1/11 mm, 9% invasion). Carcinoma component is serous  carcinoma, high grade. Sarcomatous component is undifferentiated. Cytology negative, no LVSI, nodes negative. Stage IA MMMT       11/6/2019 - 2/27/2020 Chemotherapy    OP UTERINE PACLitaxel / CARBOplatin (Q21D)  Toxicities included neutropenia requiring dose reductions (cycle 3&4) and G-CSF, neuropathy, and dehydration.   Cycle #6 delayed due to hospitalization.     11/19/2019 - 12/5/2019 Radiation    Radiation OncologyTreatment Course:  Yin Siddiqui received 3000 cGy in 5 fractions to vagina via High Dose Radiation - HDR, vaginal brachytherapy.      2/19/2020 - 2/23/2020 Other Event    Inpatient hospitalization due to dehydration, hypotension, SOA, N/V, and bloody diarrhea. Patient found to be anemic secondary to duodenal bleeding ulcer. Treated with interventional EGD, blood transfusion, and IV hydration.      3/19/2020 Imaging    Post-treatment CT chest, abdomen, pelvis negative for disease     6/3/2020 Survivorship    Survivorship Care Plan completed and discussed with patient.  Copy of Survivorship Care Plan provided to patient and primary care provider.     9/2/2020 Imaging    CT shows stable appearance of the chest, abdomen and pelvis with hepatic steatosis and hepatic cysts again noted as well as degenerative  changes of the spine without evidence for local pelvic recurrence or metastatic involvement     2/24/2021 Imaging    CT chest, abdomen, pelvis:  No evidence of acute intra-abdominal or pelvic abnormality. No CT evidence of acute intrathoracic abnormality. Chronic changes seen in the lung bases with no evidence of metastatic disease. The findings are stable and unchanged. No evidence of progression.     8/25/2021 Imaging    CT chest, abdomen, pelvis:  Stable appearance of the chest, abdomen and pelvis without evidence for recurrent or active malignancy. No acute pathology otherwise noted.     6/29/2022 Imaging    CT chest, abdomen, pelvis:  1.  No evidence of metastatic disease within the chest,  abdomen, or pelvis.  2.  Stable 8 mm low-density mass in the left lobe of the liver likely a cyst           The current medication list and allergy list were reviewed and reconciled.     Past Medical History, Past Surgical History, Social History, Family History have been reviewed and are without significant changes except as mentioned.      Review of Systems   HENT: Positive for congestion and postnasal drip.    Respiratory: Negative.    Cardiovascular: Negative.    Gastrointestinal: Negative.    Genitourinary: Negative.    Musculoskeletal: Positive for arthralgias.         Objective   Physical Exam  Vital Signs: /63   Pulse 68   Temp 97.3 °F (36.3 °C)   Resp 14   Wt 54.4 kg (119 lb 14.4 oz)   SpO2 96%   BMI 21.93 kg/m²   Vitals:    07/08/22 1028   PainSc: 0-No pain           General Appearance:  alert, cooperative, no apparent distress, appears stated age and normal weight   Neurologic/Psychiatric: A&O x 3, gait steady, appropriate affect   HEENT:  Normocephalic, without obvious abnormality, mucous membranes moist   Abdomen:   Soft, non-tender, non-distended and no organomegaly   Lymph nodes: No cervical, supraclavicular, inguinal adenopathy noted   Pelvic: External Genitalia  atrophic, without lesions  Vagina  is pale, atrophic.  and narrow introitus  Vaginal Cuff  Female Vaginal Cuff: smooth, intact, without visible lesions and changes consistent with previous radiation  Uterus  surgically absent and no palpable masses  Ovaries  surgically absent bilaterally  Parametria  smooth  Rectovaginal  Female rectovaginal: deferred     ECOG score: 0             Result Review :   The following data was reviewed by: HERIBERTO Carr on 07/08/22:  Data reviewed: Radiologic studies surveillance CT 6/29/2022  CT Abdomen Pelvis With Contrast, CT Chest With Contrast Diagnostic  Narrative: CT CHEST W CONTRAST DIAGNOSTIC-, CT ABDOMEN PELVIS W CONTRAST-     Date of Exam: 6/29/2022 10:41 AM     Indication:  Uterine sarcoma, monitor; Z85.42-Personal history of  malignant neoplasm of other parts of uterus.     Comparison Exams: 8/25/2021, 2/24/2021     Technique: Multiple axial images were obtained from the thoracic inlet  through the symphysis pubis after the administration of IV contrast. The  axial data was used to generate reformatted images in the coronal and  sagittal planes.   Automated exposure control and iterative reconstruction methods were  used.        FINDINGS:  Chest: There is no mediastinal, hilar, or axillary adenopathy.  There  are no pleural effusions.  Calcified granuloma seen within the right  lung apex.  Lungs are otherwise clear.     ABDOMEN: There is an 8 mm low-density mass within the left lobe of the  liver unchanged from multiple prior studies favored to be a cyst or  hemangioma.  No enhancing liver lesion identified.  Spleen and pancreas  are within normal limits.  Bilateral adrenal glands appear normal.   There are multiple bilateral parapelvic renal cyst.  No evidence  hydronephrosis.  There is no abdominal or retroperitoneal adenopathy.   The upper GI tract is within normal limits.  Gallbladder appears normal.     Pelvis: Urinary bladder is within normal limits.  Patient status post  hysterectomy.  GI tract is within normal limits.  No pelvic or inguinal  adenopathy.  No free intraperitoneal fluid.     Bones: No lytic or sclerotic bony lesion identified.  Degenerative  changes are again noted throughout the spine.     Impression:    1.  No evidence of metastatic disease within the chest, abdomen, or  pelvis.  2.  Stable 8 mm low-density mass in the left lobe of the liver likely a  cyst     This report was finalized on 6/29/2022 12:34 PM by Pedro Ramon MD.         Tumor marker:     Date Value Ref Range Status   09/29/2021 7.5 0.0 - 38.1 U/mL Final   12/09/2020 7.0 0.0 - 38.1 U/mL Final   09/08/2020 7.0 0.0 - 38.1 U/mL Final   02/26/2020 7.0 0.0 - 38.1 U/mL Final       Procedure  Note:            Assessment and Plan:    Diagnoses and all orders for this visit:    1. History of endometrial cancer (Primary)    2. Chronic nasal congestion          There is no evidence of disease upon today's exam. Time was taken to review recent CT scan results with patient and  at bedside. They are very pleased that imaging shows ARIS. We will repeat CT PRN. She is now about 2.5 years out since completion of treatment and doing well. Continue every 6 month surveillance visits. She is understanding to call with any changes in pelvic symptoms or general GYN concerns at any time between regularly scheduled visits.    Continue Ipatropium and resume Flonase BID, back off if becomes too dry. If symptoms worsen, may seek ENT second-opinion as patient and  not happy with first referral.     Pain assessment was performed today as a part of patient’s care.  For patients with pain related to surgery, gynecologic malignancy or cancer treatment, the plan is as noted in the assessment/plan.  For patients with pain not related to these issues, they are to seek any further needed care from a more appropriate provider, such as PCP.      Follow-up:     Return to clinic in 6 months for ongoing cancer surveillance.      Electronically signed by HERIBERTO Carr on 07/08/22 at 10:58 EDT

## 2022-09-09 ENCOUNTER — HOSPITAL ENCOUNTER (OUTPATIENT)
Dept: MAMMOGRAPHY | Facility: HOSPITAL | Age: 82
Discharge: HOME | End: 2022-09-09
Admitting: FAMILY MEDICINE

## 2022-09-09 DIAGNOSIS — Z12.31 VISIT FOR SCREENING MAMMOGRAM: ICD-10-CM

## 2022-09-09 PROCEDURE — 77063 BREAST TOMOSYNTHESIS BI: CPT | Performed by: RADIOLOGY

## 2022-09-09 PROCEDURE — 77067 SCR MAMMO BI INCL CAD: CPT

## 2022-09-09 PROCEDURE — 77063 BREAST TOMOSYNTHESIS BI: CPT

## 2022-09-09 PROCEDURE — 77067 SCR MAMMO BI INCL CAD: CPT | Performed by: RADIOLOGY

## 2023-01-11 ENCOUNTER — LAB (OUTPATIENT)
Dept: LAB | Facility: HOSPITAL | Age: 83
End: 2023-01-11
Payer: MEDICARE

## 2023-01-11 ENCOUNTER — OFFICE VISIT (OUTPATIENT)
Dept: GYNECOLOGIC ONCOLOGY | Facility: CLINIC | Age: 83
End: 2023-01-11
Payer: MEDICARE

## 2023-01-11 VITALS
HEART RATE: 62 BPM | BODY MASS INDEX: 21.29 KG/M2 | WEIGHT: 124.7 LBS | OXYGEN SATURATION: 97 % | DIASTOLIC BLOOD PRESSURE: 59 MMHG | SYSTOLIC BLOOD PRESSURE: 122 MMHG | TEMPERATURE: 97.5 F | HEIGHT: 64 IN | RESPIRATION RATE: 17 BRPM

## 2023-01-11 DIAGNOSIS — C54.1 ENDOMETRIAL CANCER: Primary | ICD-10-CM

## 2023-01-11 DIAGNOSIS — C54.1 ENDOMETRIAL CANCER: ICD-10-CM

## 2023-01-11 PROBLEM — E78.5 HYPERLIPIDEMIA: Status: ACTIVE | Noted: 2022-09-07

## 2023-01-11 LAB — CANCER AG125 SERPL QL: 8.2 U/ML (ref 0–38.1)

## 2023-01-11 PROCEDURE — 86304 IMMUNOASSAY TUMOR CA 125: CPT

## 2023-01-11 PROCEDURE — 99212 OFFICE O/P EST SF 10 MIN: CPT | Performed by: NURSE PRACTITIONER

## 2023-01-11 PROCEDURE — 36415 COLL VENOUS BLD VENIPUNCTURE: CPT

## 2023-01-11 RX ORDER — FLUTICASONE PROPIONATE 50 MCG
SPRAY, SUSPENSION (ML) NASAL
COMMUNITY
Start: 2022-12-27

## 2023-01-11 RX ORDER — TRAZODONE HYDROCHLORIDE 50 MG/1
TABLET ORAL
COMMUNITY
Start: 2022-12-04

## 2023-01-11 RX ORDER — CETIRIZINE HYDROCHLORIDE 10 MG/1
TABLET ORAL
COMMUNITY

## 2023-01-11 RX ORDER — IPRATROPIUM BROMIDE 42 UG/1
SPRAY, METERED NASAL
COMMUNITY
Start: 2023-01-08

## 2023-01-11 NOTE — PROGRESS NOTES
GYN ONCOLOGY CANCER SURVEILLANCE FOLLOW-UP    Yin Siddiqui  [id number]  1940    Subjective   Chief Complaint: Uterine Cancer (No complaints)        History of present illness:     Yin Siddiqui is a 82 y.o. year old female who is here today for ongoing surveillance of uterine carcinosarcoma, see history below. She is 3 years out from completion of treatment. She is accompanied by her . She is feeling generally well today and has no GYN complaints. She denies vaginal bleeding, pelvic pain, concerning lesions, and changes in bowel or bladder function. She continues to have persistent clear nasal congestion and drainage since treatment, unchanged, well managed with current nasal sprays.          Cancer History:   Oncology/Hematology History   Uterine Carcinosarcoma    9/18/2019 Initial Diagnosis    Endometrial biopsy by Dr. Lam due to recurrent postmenopausal bleeding. Pathology showed cytologic atypia in background of extensive tumor necrosis.  =36.6. Referred to Gyn Oncology     9/18/2019 Imaging    CT abd/pelvis IMPRESSION:  1. Approximately 7 cm central uterine mass, strongly vascular along its  posterior lateral right margin at approximately 7:00 as noted above. No  evidence of invasion into the surrounding soft tissues.  2. No evidence of adenopathy or other metastatic disease.  3. 2 cm nonenhancing water density right ovarian cyst.  4. Incidentally noted small hepatic cyst and normal variant bilateral  renal parapelvic cysts.  Pre-op CXR ARIS     9/27/2019 Surgery    Exploratory laparotomy, Type 1 radical hysterectomy, BSO, pelvic lymph node dissection, omentectomy, cystoscopy with bilateral temporary ureteral stents.     Final pathology revealed carcinosarcoma (MMMT), multiple foci measuring up to 7.8 cm, filling entire endometrial cavity, with minimal myometrial invasion (up to 1/11 mm, 9% invasion). 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 Sarcomatous component is  undifferentiated. Cytology negative, no LVSI, nodes negative. Stage IA MMMT       11/6/2019 - 2/27/2020 Chemotherapy    OP UTERINE PACLitaxel / CARBOplatin (Q21D)  Toxicities included neutropenia requiring dose reductions (cycle 3&4) and G-CSF, neuropathy, and dehydration.   Cycle #6 delayed due to hospitalization.     11/19/2019 - 12/5/2019 Radiation    Radiation OncologyTreatment Course:  Yin Siddiqui received 3000 cGy in 5 fractions to vagina via High Dose Radiation - HDR, vaginal brachytherapy.      2/19/2020 - 2/23/2020 Other Event    Inpatient hospitalization due to dehydration, hypotension, SOA, N/V, and bloody diarrhea. Patient found to be anemic secondary to duodenal bleeding ulcer. Treated with interventional EGD, blood transfusion, and IV hydration.      3/19/2020 Imaging    Post-treatment CT chest, abdomen, pelvis negative for disease     6/3/2020 Survivorship    Survivorship Care Plan completed and discussed with patient.  Copy of Survivorship Care Plan provided to patient and primary care provider.     9/2/2020 Imaging    CT shows stable appearance of the chest, abdomen and pelvis with hepatic steatosis and hepatic cysts again noted as well as degenerative  changes of the spine without evidence for local pelvic recurrence or metastatic involvement     2/24/2021 Imaging    CT chest, abdomen, pelvis:  No evidence of acute intra-abdominal or pelvic abnormality. No CT evidence of acute intrathoracic abnormality. Chronic changes seen in the lung bases with no evidence of metastatic disease. The findings are stable and unchanged. No evidence of progression.     8/25/2021 Imaging    CT chest, abdomen, pelvis:  Stable appearance of the chest, abdomen and pelvis without evidence for recurrent or active malignancy. No acute pathology otherwise noted.     6/29/2022 Imaging    CT chest, abdomen, pelvis:  1.  No evidence of metastatic disease within the chest, abdomen, or pelvis.  2.  Stable 8 mm low-density  "mass in the left lobe of the liver likely a cyst           The current medication list and allergy list were reviewed and reconciled.     Past Medical History, Past Surgical History, Social History, Family History have been reviewed and are without significant changes except as mentioned.      Review of Systems   HENT: Positive for congestion and postnasal drip.    Respiratory: Negative.    Cardiovascular: Negative.    Gastrointestinal: Negative.    Genitourinary: Negative.    Musculoskeletal: Positive for arthralgias.         Objective   Physical Exam  Vital Signs: /59   Pulse 62   Temp 97.5 °F (36.4 °C) (Temporal)   Resp 17   Ht 161.3 cm (63.5\")   Wt 56.6 kg (124 lb 11.2 oz)   SpO2 97%   BMI 21.74 kg/m²   Vitals:    01/11/23 1022   PainSc: 0-No pain           General Appearance:  alert, cooperative, no apparent distress, appears stated age and normal weight   Neurologic/Psychiatric: A&O x 3, gait steady, appropriate affect   HEENT:  Normocephalic, without obvious abnormality, mucous membranes moist   Abdomen:   Soft, non-tender, non-distended and no organomegaly   Lymph nodes: No cervical, supraclavicular, inguinal adenopathy noted   Pelvic: External Genitalia  atrophic, without lesions  Vagina  is pale, atrophic.  and narrow introitus  Vaginal Cuff  Female Vaginal Cuff: smooth, intact, without visible lesions and changes consistent with previous radiation  Uterus  surgically absent and no palpable masses  Ovaries  surgically absent bilaterally  Parametria  smooth  Rectovaginal  Female rectovaginal: deferred     ECOG score: 0             Result Review :   The following data was reviewed by: HERIBERTO Carr on 01/11/23:  Last imaging study: CT 6/29/2022      Tumor marker:     Date Value Ref Range Status   09/29/2021 7.5 0.0 - 38.1 U/mL Final   12/09/2020 7.0 0.0 - 38.1 U/mL Final   09/08/2020 7.0 0.0 - 38.1 U/mL Final   02/26/2020 7.0 0.0 - 38.1 U/mL Final       Procedure Note:         "    Assessment and Plan:    Diagnoses and all orders for this visit:    1. Uterine Carcinosarcoma  (Primary)  -     ; Future          There is no evidence of disease upon today's exam. She is now about 3 years out since completion of treatment and doing well. Continue every 6 month surveillance visits until the 5 year fortino. Plan for CA-125 periodically and CT scan PRN. She will be notified of lab results upon their return. She is understanding to call with any changes in pelvic symptoms or general GYN concerns at any time between regularly scheduled visits.      Pain assessment was performed today as a part of patient’s care.  For patients with pain related to surgery, gynecologic malignancy or cancer treatment, the plan is as noted in the assessment/plan.  For patients with pain not related to these issues, they are to seek any further needed care from a more appropriate provider, such as PCP.      Follow-up:     Return to clinic in 6 months for ongoing cancer surveillance.      Electronically signed by HERIBERTO Carr on 01/11/23 at 10:50 EST

## 2023-01-12 ENCOUNTER — TELEPHONE (OUTPATIENT)
Dept: GYNECOLOGIC ONCOLOGY | Facility: CLINIC | Age: 83
End: 2023-01-12
Payer: MEDICARE

## 2023-01-12 NOTE — TELEPHONE ENCOUNTER
----- Message from HERIBERTO Carr sent at 1/12/2023  8:27 AM EST -----  Please notify CA-125 low/stable. Thanks!

## 2023-07-25 ENCOUNTER — TELEPHONE (OUTPATIENT)
Dept: GYNECOLOGIC ONCOLOGY | Facility: CLINIC | Age: 83
End: 2023-07-25

## 2023-07-25 DIAGNOSIS — Z12.31 BREAST CANCER SCREENING BY MAMMOGRAM: Primary | ICD-10-CM

## 2023-07-25 NOTE — TELEPHONE ENCOUNTER
Caller: Jose Siddiqui    Relationship: Emergency Contact    Best call back number: [phone]    What orders are you requesting (i.e. lab or imaging): MAMMOGRAM    In what timeframe would the patient need to come in: 09/2023    Where will you receive your lab/imaging services: [facility]

## 2023-10-13 ENCOUNTER — HOSPITAL ENCOUNTER (OUTPATIENT)
Dept: MAMMOGRAPHY | Facility: HOSPITAL | Age: 83
Discharge: HOME | End: 2023-10-13
Admitting: NURSE PRACTITIONER
Payer: MEDICARE

## 2023-10-13 DIAGNOSIS — Z12.31 BREAST CANCER SCREENING BY MAMMOGRAM: ICD-10-CM

## 2023-10-13 PROCEDURE — 77063 BREAST TOMOSYNTHESIS BI: CPT

## 2023-10-13 PROCEDURE — 77067 SCR MAMMO BI INCL CAD: CPT

## 2024-01-15 ENCOUNTER — OFFICE VISIT (OUTPATIENT)
Dept: GYNECOLOGIC ONCOLOGY | Facility: CLINIC | Age: 84
End: 2024-01-15
Payer: MEDICARE

## 2024-01-15 VITALS
OXYGEN SATURATION: 97 % | HEART RATE: 75 BPM | WEIGHT: 127.7 LBS | RESPIRATION RATE: 18 BRPM | SYSTOLIC BLOOD PRESSURE: 130 MMHG | TEMPERATURE: 97.4 F | HEIGHT: 64 IN | BODY MASS INDEX: 21.8 KG/M2 | DIASTOLIC BLOOD PRESSURE: 61 MMHG

## 2024-01-15 DIAGNOSIS — Z01.419 WELL WOMAN EXAM WITH ROUTINE GYNECOLOGICAL EXAM: Primary | ICD-10-CM

## 2024-01-15 DIAGNOSIS — Z85.42 HISTORY OF ENDOMETRIAL CANCER: ICD-10-CM

## 2024-01-15 DIAGNOSIS — R09.81 CHRONIC NASAL CONGESTION: ICD-10-CM

## 2024-01-15 PROCEDURE — 1125F AMNT PAIN NOTED PAIN PRSNT: CPT | Performed by: NURSE PRACTITIONER

## 2024-01-15 PROCEDURE — 99397 PER PM REEVAL EST PAT 65+ YR: CPT | Performed by: NURSE PRACTITIONER

## 2024-01-15 RX ORDER — CHOLECALCIFEROL (VITAMIN D3) 125 MCG
10 CAPSULE ORAL NIGHTLY
COMMUNITY

## 2024-01-15 RX ORDER — MEMANTINE HYDROCHLORIDE 5 MG/1
5 TABLET ORAL 2 TIMES DAILY
COMMUNITY
Start: 2023-11-07

## 2024-01-15 NOTE — PROGRESS NOTES
GYN ONCOLOGY ANNUAL WELL WOMAN VISIT      Yin Siddiqui  [id number]  1940      Subjective   Chief Complaint: Annual Exam and Uterine Cancer        History of present illness:      Yin Siddiqui is a 83 y.o. year old female who is here today for an annual exam and ongoing surveillance of uterine carcinosarcoma, see history below. She is approaching 4 years out from completion of treatment. She is accompanied by her . She reports she is feeling very well today and has no gyn complaints. She denies vaginal bleeding, pelvic pain, changes in bowel or bladder function, new or concerning lesions, and breast problems.  She continues to have persistent clear nasal congestion and drainage since treatment, unchanged, well managed with current nasal sprays.       Cancer History:   Oncology/Hematology History   Uterine Carcinosarcoma    9/18/2019 Initial Diagnosis    Endometrial biopsy by Dr. Lam due to recurrent postmenopausal bleeding. Pathology showed cytologic atypia in background of extensive tumor necrosis.  =36.6. Referred to Gyn Oncology     9/18/2019 Imaging    CT abd/pelvis IMPRESSION:  1. Approximately 7 cm central uterine mass, strongly vascular along its  posterior lateral right margin at approximately 7:00 as noted above. No  evidence of invasion into the surrounding soft tissues.  2. No evidence of adenopathy or other metastatic disease.  3. 2 cm nonenhancing water density right ovarian cyst.  4. Incidentally noted small hepatic cyst and normal variant bilateral  renal parapelvic cysts.  Pre-op CXR ARIS     9/27/2019 Surgery    Exploratory laparotomy, Type 1 radical hysterectomy, BSO, pelvic lymph node dissection, omentectomy, cystoscopy with bilateral temporary ureteral stents.     Final pathology revealed carcinosarcoma (MMMT), multiple foci measuring up to 7.8 cm, filling entire endometrial cavity, with minimal myometrial invasion (up to 1/11 mm, 9% invasion). Carcinoma component is serous  carcinoma, high grade. Sarcomatous component is undifferentiated. Cytology negative, no LVSI, nodes negative. Stage IA MMMT       11/6/2019 - 2/27/2020 Chemotherapy    OP UTERINE PACLitaxel / CARBOplatin (Q21D)  Toxicities included neutropenia requiring dose reductions (cycle 3&4) and G-CSF, neuropathy, and dehydration.   Cycle #6 delayed due to hospitalization.     11/19/2019 - 12/5/2019 Radiation    Radiation OncologyTreatment Course:  Yin Siddiqui received 3000 cGy in 5 fractions to vagina via High Dose Radiation - HDR, vaginal brachytherapy.      2/19/2020 - 2/23/2020 Other Event    Inpatient hospitalization due to dehydration, hypotension, SOA, N/V, and bloody diarrhea. Patient found to be anemic secondary to duodenal bleeding ulcer. Treated with interventional EGD, blood transfusion, and IV hydration.      3/19/2020 Imaging    Post-treatment CT chest, abdomen, pelvis negative for disease     6/3/2020 Survivorship    Survivorship Care Plan completed and discussed with patient.  Copy of Survivorship Care Plan provided to patient and primary care provider.     9/2/2020 Imaging    CT shows stable appearance of the chest, abdomen and pelvis with hepatic steatosis and hepatic cysts again noted as well as degenerative  changes of the spine without evidence for local pelvic recurrence or metastatic involvement     2/24/2021 Imaging    CT chest, abdomen, pelvis:  No evidence of acute intra-abdominal or pelvic abnormality. No CT evidence of acute intrathoracic abnormality. Chronic changes seen in the lung bases with no evidence of metastatic disease. The findings are stable and unchanged. No evidence of progression.     8/25/2021 Imaging    CT chest, abdomen, pelvis:  Stable appearance of the chest, abdomen and pelvis without evidence for recurrent or active malignancy. No acute pathology otherwise noted.     6/29/2022 Imaging    CT chest, abdomen, pelvis:  1.  No evidence of metastatic disease within the chest,  "abdomen, or pelvis.  2.  Stable 8 mm low-density mass in the left lobe of the liver likely a cyst         Obstetric History:  OB History          2    Para   2    Term   2            AB        Living   2         SAB        IAB        Ectopic        Molar        Multiple        Live Births   2               Menstrual History:     No LMP recorded (lmp unknown). Patient has had a hysterectomy.          The current medication list and allergy list were reviewed and reconciled.     Past Medical History, Past Surgical History, Social History, Family History have been reviewed and are without significant changes except as mentioned.    Health Maintenance:  Last mammogram was 10/13/2023. Last colonoscopy was 2019, with recommended follow-up PRN. Last DEXA was 2020.        Review of Systems   Constitutional: Negative.    HENT:  Positive for congestion and postnasal drip.    Gastrointestinal: Negative.    Genitourinary: Negative.    Musculoskeletal:  Positive for arthralgias.   Psychiatric/Behavioral: Negative.           Objective   Physical Exam  Vital Signs: /61   Pulse 75   Temp 97.4 °F (36.3 °C) (Temporal)   Resp 18   Ht 161.3 cm (63.5\")   Wt 57.9 kg (127 lb 11.2 oz)   LMP  (LMP Unknown)   SpO2 97%   BMI 22.27 kg/m²   Vitals:    01/15/24 0954   PainSc:   2   PainLoc: Back     Pain being managed through medication therapy.     General Appearance:  alert, cooperative, no apparent distress and appears stated age   Neurologic/Psych: A&O x 3, gait steady, appropriate affect   Lungs:   Clear to auscultation bilaterally; respirations regular, even, and unlabored bilaterally   Heart:  Regular rate and rhythm, no murmurs appreciated   Breasts:  Symmetrical, no masses, no lesions, and no nipple discharge   Abdomen:   Soft, non-tender, non-distended, and no organomegaly   Lymph nodes: No cervical, supraclavicular, inguinal or axillary adenopathy noted   Extremities: Normal, atraumatic; no clubbing, " cyanosis, or edema    Skin: No rashes, ulcers, or suspicious lesions noted   Pelvic: External Genitalia  atrophic, without lesions  Vagina  is pale, atrophic.  and narrow introitus  Vaginal Cuff  Female Vaginal Cuff: smooth, intact, without visible lesions, and changes consistent with previous radiation  Uterus  surgically absent and no palpable masses  Ovaries  surgically absent bilaterally  Parametria  smooth  Rectovaginal  Female rectovaginal: deferred                   Result Review :   The following data was reviewed by: HERIBERTO Carr on 01/15/2024:  Last imaging study was CT chest, abdomen, pelvis 6/29/2022.   Tumor marker:     Date Value Ref Range Status   07/13/2023 7.9 0.0 - 38.1 U/mL Final   01/11/2023 8.2 0.0 - 38.1 U/mL Final   09/29/2021 7.5 0.0 - 38.1 U/mL Final   12/09/2020 7.0 0.0 - 38.1 U/mL Final       PHQ-9 Total Score:      Procedure Note:          Assessment and Plan:      Diagnoses and all orders for this visit:    1. Well woman exam with routine gynecological exam (Primary)    2. History of endometrial cancer  -     ; Future    3. Chronic nasal congestion          There is no evidence of disease upon today's exam. She is approaching 4 years out since completion of treatment and doing well. Continue every 6 month surveillance visits until the 5 year fortino. Plan for CA-125 periodically and CT scan PRN. She will be notified of lab results upon their return. She is understanding to call with any changes in pelvic symptoms or general GYN concerns at any time between regularly scheduled visits.     She was encouraged to get yearly mammograms.  She should report any palpable breast lump(s) or skin changes regardless of mammographic findings.  I explained to Yin that notification regarding her mammogram results will come from the center performing the study.  Our office will not be routinely calling with mammogram results.  It is her responsibility to make sure that the  results from the mammogram are communicated to her by the breast center.  If she has any questions about the results, she is welcome to call our office anytime.    DEXA and colonoscopy followed by PCP.     Continue Atrovent TID and daily antihistamine for chronic nasal congestion.     Pain assessment was performed today as a part of patient’s care. For patients with pain related to surgery, gynecologic malignancy or cancer treatment, the plan is as noted in the assessment/plan.  For patients with pain not related to these issues, they are to seek any further needed care from a more appropriate provider, such as PCP.      Follow-up:     Return to clinic in 6 months for ongoing cancer surveillance.      Electronically signed by HERIBERTO Carr on 01/15/2024

## 2024-07-17 ENCOUNTER — OFFICE VISIT (OUTPATIENT)
Dept: GYNECOLOGIC ONCOLOGY | Facility: CLINIC | Age: 84
End: 2024-07-17
Payer: MEDICARE

## 2024-07-17 ENCOUNTER — LAB (OUTPATIENT)
Dept: LAB | Facility: HOSPITAL | Age: 84
End: 2024-07-17
Payer: MEDICARE

## 2024-07-17 VITALS
HEIGHT: 64 IN | RESPIRATION RATE: 17 BRPM | TEMPERATURE: 97.3 F | HEART RATE: 67 BPM | BODY MASS INDEX: 21.58 KG/M2 | WEIGHT: 126.4 LBS | DIASTOLIC BLOOD PRESSURE: 66 MMHG | OXYGEN SATURATION: 96 % | SYSTOLIC BLOOD PRESSURE: 147 MMHG

## 2024-07-17 DIAGNOSIS — Z85.42 HISTORY OF ENDOMETRIAL CANCER: Primary | ICD-10-CM

## 2024-07-17 DIAGNOSIS — Z85.42 HISTORY OF ENDOMETRIAL CANCER: ICD-10-CM

## 2024-07-17 DIAGNOSIS — R09.81 CHRONIC NASAL CONGESTION: ICD-10-CM

## 2024-07-17 LAB — CANCER AG125 SERPL QL: 7.7 U/ML (ref 0–38.1)

## 2024-07-17 PROCEDURE — 36415 COLL VENOUS BLD VENIPUNCTURE: CPT

## 2024-07-17 PROCEDURE — 86304 IMMUNOASSAY TUMOR CA 125: CPT

## 2024-07-17 NOTE — PROGRESS NOTES
GYN ONCOLOGY CANCER SURVEILLANCE FOLLOW-UP    Yin Siddiqui  [id number]  1940    Subjective   Chief Complaint: Uterine cancer, surveillance       History of present illness:   Yin Siddiqui is a 83 y.o. year old female who is here today for ongoing surveillance of uterine carcinosarcoma, see history below. She is 4.5 years out from completion of treatment. She is accompanied by her . She is feeling generally well today and has no GYN complaints. She denies vaginal bleeding, pelvic pain, concerning lesions, and changes in bowel or bladder function.   She continues to have persistent nasal congestion, clear drainage, and post nasal drip. She is not taking any oral antihistamines but is on 3 nasal sprays-- cannot recall the name of sprays not on current med list. This is her most bothersome symptom and they inquire about any suggestions as they have seen several specialists.    reports that the only major medical change since last visit is a notable cognitive change and worsening dementia. Taking both Aricept and namenda. He requests a handicap placard as parking far away during her numerous dr king is becoming more difficult.        Cancer History:   Oncology/Hematology History   Uterine Carcinosarcoma    9/18/2019 Initial Diagnosis    Endometrial biopsy by Dr. Lam due to recurrent postmenopausal bleeding. Pathology showed cytologic atypia in background of extensive tumor necrosis.  =36.6. Referred to Gyn Oncology     9/18/2019 Imaging    CT abd/pelvis IMPRESSION:  1. Approximately 7 cm central uterine mass, strongly vascular along its  posterior lateral right margin at approximately 7:00 as noted above. No  evidence of invasion into the surrounding soft tissues.  2. No evidence of adenopathy or other metastatic disease.  3. 2 cm nonenhancing water density right ovarian cyst.  4. Incidentally noted small hepatic cyst and normal variant bilateral  renal parapelvic cysts.  Pre-op CXR ARIS      9/27/2019 Surgery    Exploratory laparotomy, Type 1 radical hysterectomy, BSO, pelvic lymph node dissection, omentectomy, cystoscopy with bilateral temporary ureteral stents.     Final pathology revealed carcinosarcoma (MMMT), multiple foci measuring up to 7.8 cm, filling entire endometrial cavity, with minimal myometrial invasion (up to 1/11 mm, 9% invasion). 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 Sarcomatous component is undifferentiated. Cytology negative, no LVSI, nodes negative. Stage IA MMMT       11/6/2019 - 2/27/2020 Chemotherapy    OP UTERINE PACLitaxel / CARBOplatin (Q21D)  Toxicities included neutropenia requiring dose reductions (cycle 3&4) and G-CSF, neuropathy, and dehydration.   Cycle #6 delayed due to hospitalization.     11/19/2019 - 12/5/2019 Radiation    Radiation OncologyTreatment Course:  Yin Siddiqui received 3000 cGy in 5 fractions to vagina via High Dose Radiation - HDR, vaginal brachytherapy.      2/19/2020 - 2/23/2020 Other Event    Inpatient hospitalization due to dehydration, hypotension, SOA, N/V, and bloody diarrhea. Patient found to be anemic secondary to duodenal bleeding ulcer. Treated with interventional EGD, blood transfusion, and IV hydration.      3/19/2020 Imaging    Post-treatment CT chest, abdomen, pelvis negative for disease     6/3/2020 Survivorship    Survivorship Care Plan completed and discussed with patient.  Copy of Survivorship Care Plan provided to patient and primary care provider.     9/2/2020 Imaging    CT shows stable appearance of the chest, abdomen and pelvis with hepatic steatosis and hepatic cysts again noted as well as degenerative  changes of the spine without evidence for local pelvic recurrence or metastatic involvement     2/24/2021 Imaging    CT chest, abdomen, pelvis:  No evidence of acute intra-abdominal or pelvic abnormality. No CT evidence of acute intrathoracic abnormality. Chronic changes seen in the lung bases with no evidence  "of metastatic disease. The findings are stable and unchanged. No evidence of progression.     8/25/2021 Imaging    CT chest, abdomen, pelvis:  Stable appearance of the chest, abdomen and pelvis without evidence for recurrent or active malignancy. No acute pathology otherwise noted.     6/29/2022 Imaging    CT chest, abdomen, pelvis:  1.  No evidence of metastatic disease within the chest, abdomen, or pelvis.  2.  Stable 8 mm low-density mass in the left lobe of the liver likely a cyst           The current medication list and allergy list were reviewed and reconciled.     Past Medical History, Past Surgical History, Social History, Family History have been reviewed and are without significant changes except as mentioned.      Review of Systems   Constitutional: Negative.    HENT:  Positive for postnasal drip and rhinorrhea.    Gastrointestinal: Negative.    Genitourinary: Negative.    Neurological:  Positive for memory problem.           Objective   Physical Exam  Vital Signs: /66   Pulse 67   Temp 97.3 °F (36.3 °C) (Temporal)   Resp 17   Ht 161.3 cm (63.5\")   Wt 57.3 kg (126 lb 6.4 oz)   LMP  (LMP Unknown)   SpO2 96%   BMI 22.04 kg/m²   Vitals:    07/17/24 1455   PainSc: 0-No pain           General Appearance:  alert, cooperative, no apparent distress and appears stated age   Neurologic/Psych: A&O x 3, gait steady, appropriate affect   HEENT:  Normocephalic, without obvious abnormality, mucous membranes moist   Abdomen:   Soft, non-tender, non-distended, and no organomegaly   Lymph nodes: No cervical, supraclavicular, inguinal adenopathy noted   Pelvic: External Genitalia  atrophic, without lesions  Vagina  is pale, atrophic.  and narrow introitus   Vaginal Cuff  Female Vaginal Cuff: smooth, intact, without visible lesions, and changes consistent with previous radiation  Uterus  surgically absent and no palpable masses  Ovaries  surgically absent bilaterally and without palpable masses or " fullness  Parametria  smooth  Rectovaginal  Female rectovaginal: deferred     ECOG score: 1             Result Review :  -last gyn onc note    Last imaging study was CT C/A/P 6-.   Tumor marker:     Date Value Ref Range Status   07/13/2023 7.9 0.0 - 38.1 U/mL Final   01/11/2023 8.2 0.0 - 38.1 U/mL Final   09/29/2021 7.5 0.0 - 38.1 U/mL Final   12/09/2020 7.0 0.0 - 38.1 U/mL Final       PHQ-9 Total Score:      Procedure Note:            Assessment and Plan:        Diagnoses and all orders for this visit:    1. History of endometrial cancer (Primary)  -     ; Future    2. Chronic nasal congestion    Yin Siddiqui is a 83 y.o. with a history of a stage IA MMMT carcinosarcoma s/p exploratory laparotomy, type 1 radical hysterectomy, BSO, pelvic lymph node dissection, omentectomy, cystoscopy with bilateral temporary ureteral stents. (treatment completed in 12/2019).  She is currently without clinical evidence of disease. Signs of recurrent disease, such as vaginal bleeding, persistent abdominopelvic pain, urinary or bowel changes, and shortness of breath were reviewed.  She was advised to follow up immediately if she develops any of the above symptoms.  She is understanding to call with any changes in pelvic symptoms or general GYN concerns at any time between regularly scheduled visits. She will follow-up in 6 months which will put her past the 5 year fortino.    Health maintenance: She was reminded to maintain a healthy lifestyle with a well balanced diet, calcium and vitamin D for osteoporosis prevention, and exercise as well as continue with recommended health and cancer screening guidelines.      I asked Yin to show me how she is using nasal sprays. Pt extended neck and sprays medicine while breathing in through nose. Advised that this is incorrect administration technique and her current meds are likely not getting maximum effectiveness bc of this. Demonstrated how to use correctly a couple times  until the  felt comfortable. Continue all meds as previously prescribed.     Pain assessment was performed today as a part of patient’s care.  For patients with pain related to surgery, gynecologic malignancy or cancer treatment, the plan is as noted in the assessment/plan.  For patients with pain not related to these issues, they are to seek any further needed care from a more appropriate provider, such as PCP.           Follow-up:    Return to clinic in 6 months for Annual exam and ongoing cancer surveillance.      Electronically signed by HERIBERTO Knight on 07/17/2024

## 2024-07-18 ENCOUNTER — TELEPHONE (OUTPATIENT)
Dept: GYNECOLOGIC ONCOLOGY | Facility: CLINIC | Age: 84
End: 2024-07-18
Payer: MEDICARE

## 2024-07-18 NOTE — TELEPHONE ENCOUNTER
Patient notified of Providers comments for  low/stable with verbalized understanding and offers thank you for care.

## 2024-09-06 ENCOUNTER — TRANSCRIBE ORDERS (OUTPATIENT)
Dept: ADMINISTRATIVE | Facility: HOSPITAL | Age: 84
End: 2024-09-06
Payer: MEDICARE

## 2024-09-06 DIAGNOSIS — Z12.31 VISIT FOR SCREENING MAMMOGRAM: Primary | ICD-10-CM

## 2024-10-14 LAB
NCCN CRITERIA FLAG: NORMAL
TYRER CUZICK SCORE: 0.9

## 2024-11-20 ENCOUNTER — HOSPITAL ENCOUNTER (OUTPATIENT)
Dept: MAMMOGRAPHY | Facility: HOSPITAL | Age: 84
Discharge: HOME | End: 2024-11-20
Payer: MEDICARE

## 2024-11-20 DIAGNOSIS — Z12.31 VISIT FOR SCREENING MAMMOGRAM: ICD-10-CM

## 2024-11-20 PROCEDURE — 77063 BREAST TOMOSYNTHESIS BI: CPT

## 2024-11-20 PROCEDURE — 77067 SCR MAMMO BI INCL CAD: CPT

## 2024-12-05 ENCOUNTER — HOSPITAL ENCOUNTER (OUTPATIENT)
Dept: MAMMOGRAPHY | Facility: HOSPITAL | Age: 84
Discharge: HOME | End: 2024-12-05
Payer: MEDICARE

## 2024-12-05 DIAGNOSIS — Z12.31 VISIT FOR SCREENING MAMMOGRAM: ICD-10-CM

## 2025-02-06 ENCOUNTER — OFFICE VISIT (OUTPATIENT)
Dept: GYNECOLOGIC ONCOLOGY | Facility: CLINIC | Age: 85
End: 2025-02-06
Payer: MEDICARE

## 2025-02-06 VITALS
HEART RATE: 61 BPM | OXYGEN SATURATION: 97 % | HEIGHT: 64 IN | TEMPERATURE: 97.3 F | SYSTOLIC BLOOD PRESSURE: 153 MMHG | DIASTOLIC BLOOD PRESSURE: 67 MMHG | RESPIRATION RATE: 17 BRPM | WEIGHT: 116.9 LBS | BODY MASS INDEX: 19.96 KG/M2

## 2025-02-06 DIAGNOSIS — Z01.419 WELL WOMAN EXAM WITH ROUTINE GYNECOLOGICAL EXAM: Primary | ICD-10-CM

## 2025-02-06 DIAGNOSIS — Z85.42 HISTORY OF ENDOMETRIAL CANCER: ICD-10-CM

## 2025-02-06 PROCEDURE — 1159F MED LIST DOCD IN RCRD: CPT | Performed by: NURSE PRACTITIONER

## 2025-02-06 PROCEDURE — 1160F RVW MEDS BY RX/DR IN RCRD: CPT | Performed by: NURSE PRACTITIONER

## 2025-02-06 PROCEDURE — 1126F AMNT PAIN NOTED NONE PRSNT: CPT | Performed by: NURSE PRACTITIONER

## 2025-02-06 PROCEDURE — 99397 PER PM REEVAL EST PAT 65+ YR: CPT | Performed by: NURSE PRACTITIONER

## 2025-02-06 NOTE — PROGRESS NOTES
GYN ONCOLOGY ANNUAL WELL WOMAN VISIT      Yin Siddiqui  [id number]  1940      Subjective   Chief Complaint: Annual Exam and Uterine cancer surveillance         Answers submitted by the patient for this visit:  Primary Reason for Visit (Submitted on 1/18/2025)  What is the primary reason for your visit?: Problem Not Listed  Problem not listed (Submitted on 1/18/2025)  Chief Complaint: Other medical problem  Reason for appointment: Pelvic exam; Paps smear  anorexia: No  joint pain: No  change in stool: Yes  headaches: No  joint swelling: No  vertigo: No  visual change: No  Onset: 1 to 6 months  Chronicity: recurrent  Frequency: intermittently  Medications tried: none      History of present illness:    Yin Siddiqui is a 84 y.o. year old female who is here today for an annual exam and ongoing surveillance of uterine carcinosarcoma, see history below. She is accompanied today by her . As of two months ago in December, she reached 5 years since completion of treatment. She has no acute complaints today. She denies vaginal bleeding, pelvic pain, concerning lesions, and changes in bowel or bladder function. Last visit I demonstrated proper use of nasal sprays which she uses for chronic nasal congestion and PND.      is primary historian for the visit. He notes that her memory and cognition continues to decline, even more so than her last visit here with us. He updates that she has d/c some nose sprays and was told by another physician that part of the problem has been chronic use of meds as contributing factor to symptoms. She intermittently will experience pain in LLQ. As long as she takes metamucil each AM her belly does not bother her. She other wise is doing well. She is compliant with f/u with PCP and all specialists.     Med list updated and discussed.  states that PCP is aware she is off statin. They d/c'd her vitamin D supplement bc daily multivitamin contains adequate amount of  vitamin D.     Cancer History:   Oncology/Hematology History   Uterine Carcinosarcoma    9/18/2019 Initial Diagnosis    Endometrial biopsy by Dr. Lam due to recurrent postmenopausal bleeding. Pathology showed cytologic atypia in background of extensive tumor necrosis.  =36.6. Referred to Gyn Oncology     9/18/2019 Imaging    CT abd/pelvis IMPRESSION:  1. Approximately 7 cm central uterine mass, strongly vascular along its  posterior lateral right margin at approximately 7:00 as noted above. No  evidence of invasion into the surrounding soft tissues.  2. No evidence of adenopathy or other metastatic disease.  3. 2 cm nonenhancing water density right ovarian cyst.  4. Incidentally noted small hepatic cyst and normal variant bilateral  renal parapelvic cysts.  Pre-op CXR ARIS     9/27/2019 Surgery    Exploratory laparotomy, Type 1 radical hysterectomy, BSO, pelvic lymph node dissection, omentectomy, cystoscopy with bilateral temporary ureteral stents.     Final pathology revealed carcinosarcoma (MMMT), multiple foci measuring up to 7.8 cm, filling entire endometrial cavity, with minimal myometrial invasion (up to 1/11 mm, 9% invasion). 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 Sarcomatous component is undifferentiated. Cytology negative, no LVSI, nodes negative. Stage IA MMMT       11/6/2019 - 2/27/2020 Chemotherapy    OP UTERINE PACLitaxel / CARBOplatin (Q21D)  Toxicities included neutropenia requiring dose reductions (cycle 3&4) and G-CSF, neuropathy, and dehydration.   Cycle #6 delayed due to hospitalization.     11/19/2019 - 12/5/2019 Radiation    Radiation OncologyTreatment Course:  Yin Siddiqui received 3000 cGy in 5 fractions to vagina via High Dose Radiation - HDR, vaginal brachytherapy.      2/19/2020 - 2/23/2020 Other Event    Inpatient hospitalization due to dehydration, hypotension, SOA, N/V, and bloody diarrhea. Patient found to be anemic secondary to duodenal bleeding ulcer. Treated  with interventional EGD, blood transfusion, and IV hydration.      3/19/2020 Imaging    Post-treatment CT chest, abdomen, pelvis negative for disease     6/3/2020 Survivorship    Survivorship Care Plan completed and discussed with patient.  Copy of Survivorship Care Plan provided to patient and primary care provider.     2020 Imaging    CT shows stable appearance of the chest, abdomen and pelvis with hepatic steatosis and hepatic cysts again noted as well as degenerative  changes of the spine without evidence for local pelvic recurrence or metastatic involvement     2021 Imaging    CT chest, abdomen, pelvis:  No evidence of acute intra-abdominal or pelvic abnormality. No CT evidence of acute intrathoracic abnormality. Chronic changes seen in the lung bases with no evidence of metastatic disease. The findings are stable and unchanged. No evidence of progression.     2021 Imaging    CT chest, abdomen, pelvis:  Stable appearance of the chest, abdomen and pelvis without evidence for recurrent or active malignancy. No acute pathology otherwise noted.     2022 Imaging    CT chest, abdomen, pelvis:  1.  No evidence of metastatic disease within the chest, abdomen, or pelvis.  2.  Stable 8 mm low-density mass in the left lobe of the liver likely a cyst         Obstetric History:  OB History          2    Para   2    Term   2            AB        Living   2         SAB        IAB        Ectopic        Molar        Multiple        Live Births   2               Menstrual History:     No LMP recorded (lmp unknown). Patient has had a hysterectomy.          The current medication list and allergy list were reviewed and reconciled.     Past Medical History, Past Surgical History, Social History, Family History have been reviewed and are without significant changes except as mentioned.    Health Maintenance:  see EMR.  -Last colonoscopy 2019 by Dr Rosa, pt was referred due to +cologuard test.  "Procedure showed diverticulitis but otherwise unremarkable. Recommendation to repeat PRN.  -Last bilateral screening mammogram 12-5-2024, BI-RADS 2.  -Last DEXA 2020 (?) @[facility], osteopenia. Compliant with vit D supp and daily calcium intake recommendations.         Review of Systems   Constitutional:  Negative for chills, diaphoresis, fatigue and fever.   HENT:  Positive for congestion. Negative for sore throat and swollen glands.    Respiratory:  Negative for cough.    Cardiovascular:  Negative for chest pain.   Gastrointestinal:  Positive for abdominal pain. Negative for nausea and vomiting.   Genitourinary:  Negative for dysuria.   Skin:  Negative for rash.   Neurological:  Negative for weakness and numbness.         Objective   Physical Exam  Vital Signs: /67   Pulse 61   Temp 97.3 °F (36.3 °C) (Temporal)   Resp 17   Ht 161.3 cm (63.5\")   Wt 53 kg (116 lb 14.4 oz)   LMP  (LMP Unknown)   SpO2 97%   BMI 20.38 kg/m²   Vitals:    02/06/25 1410   PainSc: 0-No pain           General Appearance:  alert, cooperative, no apparent distress, appears stated age, and normal weight   Neurologic/Psych: A&O x 3, gait steady, appropriate affect   Lungs:   Clear to auscultation bilaterally; respirations regular, even, and unlabored bilaterally   Heart:  Regular rate and rhythm, no murmurs appreciated   Breasts:  Symmetrical, no masses, no lesions, and no nipple discharge   Abdomen:   Soft, non-tender, non-distended, and no organomegaly   Lymph nodes: No cervical, supraclavicular, inguinal or axillary adenopathy noted   Extremities: Normal, atraumatic; no clubbing, cyanosis, or edema    Skin: No rashes, ulcers, or suspicious lesions noted   Pelvic: External Genitalia  atrophic, without lesions  Vagina  is pale, atrophic.  and narrow introitus   Vaginal Cuff  Female Vaginal Cuff: smooth, intact, without visible lesions, and changes consistent with previous radiation  Uterus  surgically absent and no palpable " masses  Ovaries  surgically absent bilaterally and without palpable masses or fullness  Parametria  smooth  Rectovaginal  Female rectovaginal: deferred     ECOG score: 0             Result Review :  Last imaging was 6-.  Tumor marker:     Date Value Ref Range Status   07/17/2024 7.7 0.0 - 38.1 U/mL Final   07/13/2023 7.9 0.0 - 38.1 U/mL Final   01/11/2023 8.2 0.0 - 38.1 U/mL Final   09/29/2021 7.5 0.0 - 38.1 U/mL Final       PHQ-9 Total Score:      Procedure Note:          Assessment and Plan:      -She is currently without clinical evidence of disease. Signs of recurrent disease, such as vaginal bleeding, persistent abdominopelvic pain, urinary or bowel changes, and shortness of breath were reviewed.  She was advised to follow up immediately if she develops any of the above symptoms.  She is understanding to call with any changes in pelvic symptoms or general GYN concerns at any time between regularly scheduled visits.     -CA-125 remains low/ stable, last 7.7 in 7/2024. No labs today per req.     -mmg due 12/2025. DEXA overdue. Both managed by PCP. Recommend updating DEXA this winter at same time as mmg for convenience.     -continue all meds as prescribed and f/u with all health care team providers as scheduled     -Yin Siddiqui  is a 84 y.o. 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    -She is s/p exploratory laparotomy, type I radical hysterectomy, BSO, pelvic lymph node dissection, omentectomy, cystoscopy with bilateral temporary ureteral stents by Dr Monahan in 9/2019.  Final pathology revealed carcinosarcoma (MMMT), multiple foci measuring up to 7.8 cm, filling entire endometrial cavity, with minimal myometrial invasion (up to 1/11 mm, 9% invasion). 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 Sarcomatous component is undifferentiated. Cytology negative, no LVSI, nodes negative.   -She then underwent adjuvant chemoradiation.  Received 6 cycles of paclitaxel and  "carboplatin.  Received 3000 cGy in 5 fractions to vagina via High Dose Radiation - HDR, vaginal brachytherapy.  Treatment completed in 12/2019.    -I am happy to report that she is in remission after 5 years of observation. I have spoken to her about the symptoms to report and recommended that she be seen annually for disease surveillance. The patient and family are very appreciative of the care that they have received over the last 5 years.  -She is aware she will require continued surveillance with pelvic examinations, but no longer requires cytology (Pap tests). Signs of recurrent disease, such as vaginal bleeding, abdominopelvic pain, urinary or bowel changes, and shortness of breath were reviewed with the patient.  She was advised to follow up immediately if she develops any of the above symptoms.   -She was also reminded to maintain a healthy lifestyle with a well-balanced diet, calcium and vitamin D for osteoporosis prevention, and exercise as well as continue with recommended health and cancer screening guidelines.     Discussed plan moving forward with pt and .  She only needs to be seen annually and given recent decline with cognition and dementia, I offered to continue her well woman exams here since she is familiar with the practice in May.  Attempting to schedule with general GYN for new consultation would be difficult and cause hardship for both of them.  states that even coming to this office is \"traumatic\" for her. They both have been counseled about recommendations. He is agreeable to call me if she does develop symptoms at which time I will see her asap for eval.     I have included a review of her disease history.    Diagnoses and all orders for this visit:    1. Well woman exam with routine gynecological exam (Primary)    2. History of endometrial cancer          Pain assessment was performed today as a part of patient’s care. For patients with pain related to surgery, gynecologic " malignancy or cancer treatment, the plan is as noted in the assessment/plan.  For patients with pain not related to these issues, they are to seek any further needed care from a more appropriate provider, such as PCP.      Follow-up:    Return to clinic in 1 year for Annual exam or PRN      Electronically signed by HERIBERTO Knight on 01/24/2025

## (undated) DEVICE — BOWL UTIL STRL 32OZ

## (undated) DEVICE — BNDR ABD PREMIUM/UNIV 10IN 27TO48IN

## (undated) DEVICE — ANTIBACTERIAL UNDYED BRAIDED (POLYGLACTIN 910), SYNTHETIC ABSORBABLE SUTURE: Brand: COATED VICRYL

## (undated) DEVICE — KT ORCA ORCAPOD DISP STRL

## (undated) DEVICE — THE BITE BLOCK MAXI, LATEX FREE STRAP IS USED TO PROTECT THE ENDOSCOPE INSERTION TUBE FROM BEING BITTEN BY THE PATIENT.

## (undated) DEVICE — HLDR CATH FOL STATLOCK SWVL TRICOT

## (undated) DEVICE — DRAPE,UNDERBUTTOCKS,STERILE: Brand: MEDLINE

## (undated) DEVICE — GLV SURG SENSICARE MICRO PF LF 6 STRL

## (undated) DEVICE — SAFESECURE,SECUREMENT,FOLEY CATH,STERILE: Brand: MEDLINE

## (undated) DEVICE — Device

## (undated) DEVICE — 3M™ WARMING BLANKET, UPPER BODY, 10 PER CASE, 42268: Brand: BAIR HUGGER™

## (undated) DEVICE — DRAPE, LAVH, STERILE: Brand: MEDLINE

## (undated) DEVICE — COVER,LIGHT HANDLE,FLX,1/PK: Brand: MEDLINE INDUSTRIES, INC.

## (undated) DEVICE — SUT PDS LP 1 TP1 96IN VIO PDP880GA

## (undated) DEVICE — 3M™ STERI-DRAPE™ INSTRUMENT POUCH 1018: Brand: STERI-DRAPE™

## (undated) DEVICE — HARMONIC HD 1000I SHEARS, 20CM SHAFT LENGTH: Brand: HARMONIC

## (undated) DEVICE — SYR LL TP 10ML STRL

## (undated) DEVICE — DEV CLIP ENDO RESOLUTION360 CONTRL ROT 235CM: Type: IMPLANTABLE DEVICE | Site: DUODENUM | Status: NON-FUNCTIONAL

## (undated) DEVICE — LEX D AND C: Brand: MEDLINE INDUSTRIES, INC.

## (undated) DEVICE — SPNG ENDO BEDSIDE TUB ENZYM

## (undated) DEVICE — GLV SURG DERMASSURE GRN LF PF SZ 6.5

## (undated) DEVICE — LEX BASIC NO DRAPE: Brand: MEDLINE INDUSTRIES, INC.

## (undated) DEVICE — SYR LUERLOK 50ML

## (undated) DEVICE — METER,URINE,400ML,DRAIN BAG,L/F,LL,SLIDE: Brand: MEDLINE

## (undated) DEVICE — TUBING,OXYGEN,CRUSH RES,7',CLEAR,UC: Brand: MEDLINE INDUSTRIES, INC.

## (undated) DEVICE — SUT MONOCRYL PLS ANTIB UND 3/0  PS1 27IN

## (undated) DEVICE — CONTN GRAD MEAS TRIANG 32OZ BLK

## (undated) DEVICE — LUBE GEL ENDOGLIDE 1.1OZ

## (undated) DEVICE — HYBRID CO2 TUBING/CAP SET FOR OLYMPUS® SCOPES & CO2 SOURCE: Brand: ERBE

## (undated) DEVICE — CANNULA,OXY,ADULT,SUPERSOFT,W/7'TUB,UC: Brand: MEDLINE

## (undated) DEVICE — CATH URETRL WHSTL TP 5F70CM RT

## (undated) DEVICE — INTRO ACCSR BLNT TP

## (undated) DEVICE — SUT VICYL PLS CTD ANTIB BR 1 27IN VIL

## (undated) DEVICE — SPNG LAP PREWSH SFTPK 18X18IN STRL PK/5

## (undated) DEVICE — SUT VIC 12X27 D8116 BX/12

## (undated) DEVICE — SOL IRR H2O BTL 1000ML STRL

## (undated) DEVICE — TRY SKINPREP DRYPREP

## (undated) DEVICE — SKIN AFFIX SURG ADHESIVE 72/CS 0.55ML: Brand: MEDLINE

## (undated) DEVICE — CYSTO/BLADDER IRRIGATION SET, REGULATING CLAMP

## (undated) DEVICE — SINGLE-USE BIOPSY FORCEPS: Brand: RADIAL JAW 4